# Patient Record
Sex: FEMALE | Race: WHITE | NOT HISPANIC OR LATINO | Employment: OTHER | URBAN - METROPOLITAN AREA
[De-identification: names, ages, dates, MRNs, and addresses within clinical notes are randomized per-mention and may not be internally consistent; named-entity substitution may affect disease eponyms.]

---

## 2017-06-12 ENCOUNTER — ALLSCRIPTS OFFICE VISIT (OUTPATIENT)
Dept: OTHER | Facility: OTHER | Age: 77
End: 2017-06-12

## 2017-08-21 ENCOUNTER — GENERIC CONVERSION - ENCOUNTER (OUTPATIENT)
Dept: OTHER | Facility: OTHER | Age: 77
End: 2017-08-21

## 2017-11-02 ENCOUNTER — GENERIC CONVERSION - ENCOUNTER (OUTPATIENT)
Dept: OTHER | Facility: OTHER | Age: 77
End: 2017-11-02

## 2017-11-28 ENCOUNTER — GENERIC CONVERSION - ENCOUNTER (OUTPATIENT)
Dept: OTHER | Facility: OTHER | Age: 77
End: 2017-11-28

## 2017-12-12 ENCOUNTER — ALLSCRIPTS OFFICE VISIT (OUTPATIENT)
Dept: OTHER | Facility: OTHER | Age: 77
End: 2017-12-12

## 2017-12-13 NOTE — PROGRESS NOTES
Assessment  Assessed    1  Anxiety (300 00) (F41 9)   2  Dyspnea on exertion (786 09) (R06 09)    Discussion/Summary  Cardiology Discussion Summary Free Text Note Form [facility]:   Patient overall doing well from a cardiovascular standpoint  Results of previous cardiovascular studies including stress test and echocardiogram from last year reviewed with the patient  Symptoms to watch out from cardiac standpoint which would indicate the need for further cardiac evaluation also discussed  Follow-up in 6 months or earlier as needed   follow-up with primary care physician  Chief Complaint  Chief Complaint Chronic Condition [facility]: Patient is here today for follow up of chronic conditions described in HPI  History of Present Illness  Cardiology HPI Free Text Note Form [facility]: Patient presents for follow-up visit  Patient denies any chest pain  No shortness of breath out of the ordinary  No history of leg edema orthopnea PND no history of presyncope syncope  No new complaints otherwise  Review of Systems  Cardiology Female ROS:    Cardiac: as noted in HPI  Skin: No complaints of nonhealing sores or skin rash  Genitourinary: No complaints of recurrent urinary tract infections, frequent urination at night, difficult urination, blood in urine, kidney stones, loss of bladder control, kidney problems, denies any birth control or hormone replacement, is not post menopausal, not currently pregnant  Psychological: anxiety  General: No complaints of trouble sleeping, lack of energy, fatigue, appetite changes, weight changes, fever, frequent infections, or night sweats  Respiratory: shortness of breath, but-- as noted in HPI  HEENT: No complaints of serious problems, hearing problems, nose problems, throat problems, or snoring  Gastrointestinal: No complaints of liver problems, nausea, vomiting, heartburn, constipation, bloody stools, diarrhea, problems swallowing, adbominal pain, or rectal bleeding  Hematologic: No complaints of bleeding disorders, anemia, blood clots, or excessive brusing  Neurological: No complaints of numbness, tingling, dizziness, weakness, seizures, headaches, syncope or fainting, AM fatigue, daytime sleepiness, no witnessed apnea episodes  Musculoskeletal: No complaints of arthritis, back pain, or painfull swelling  ROS Reviewed:   ROS reviewed  Active Problems  Problems    1  Anxiety (300 00) (F41 9)   2  Chest pain (786 50) (R07 9)   3  Dyspnea on exertion (786 09) (R06 09)   4  Encounter for counseling (V65 40) (Z71 9)   5  Encounter for routine gynecological examination (V72 31) (Z01 419)   6  Murmur (785 2) (R01 1)   7  Osteoporosis (733 00) (M81 0)   8  Vaginal Pap smear (V76 47) (Z12 72)   9  Visit for screening mammogram (V76 12) (Z12 31)    Past Medical History  Problems    1  History of  4 (V22 2) (Z33 1)   2  History of Hypothyroidism (244 9) (E03 9)   3  History of Kidney damage (866 00) (S37 009A)  Active Problems And Past Medical History Reviewed: The active problems and past medical history were reviewed and updated today  Surgical History  Problems    1  History of Appendectomy   2  History of Hemorrhoidectomy   3  History of Thyroid Surgery Substernal Thyroidectomy Partial   4  History of Total Abdominal Hysterectomy With Removal Of Both Ovaries   5  History of Tubal Ligation  Surgical History Reviewed: The surgical history was reviewed and updated today  Family History  Mother    1  No pertinent family history  Family History Reviewed: The family history was reviewed and updated today  Social History  Problems    · Denied: History of Alcohol use   · Denied: History of Home environment domestic violence   · Never a smoker  Social History Reviewed: The social history was reviewed and updated today  Current Meds   1  Caltrate Plus TABS; Therapy: (Recorded:2014) to Recorded   2   Levothyroxine Sodium 100 MCG Oral Tablet; Therapy: (Recorded:04Nov2014) to Recorded   3  Magnesium TABS; Therapy: (Recorded:04Nov2014) to Recorded   4  Nizatidine 150 MG Oral Capsule; Therapy: (Recorded:04Nov2014) to Recorded   5  Vitamin D3 CAPS; Therapy: (Recorded:04Nov2014) to Recorded   6  Xanax 0 5 MG Oral Tablet; Therapy: (Recorded:04Nov2014) to Recorded  Medication List Reviewed: The medication list was reviewed and updated today  Allergies  Medication    1  No Known Drug Allergies    Vitals  Vital Signs    Recorded: [id number] 10:03AM   Heart Rate 75   Systolic 359   Diastolic 82   Height 4 ft 11 in   Weight 142 lb    BMI Calculated 28 68   BSA Calculated 1 59   O2 Saturation 98       Physical Exam   Constitutional  General appearance: No acute distress, well appearing and well nourished  Eyes  Conjunctiva and Sclera examination: Conjunctiva pink, sclera anicteric  Ears, Nose, Mouth, and Throat - Oropharynx: Clear, nares are clear, mucous membranes are moist   Neck  Neck and thyroid: Normal, supple, trachea midline, no thyromegaly  Pulmonary  Respiratory effort: No increased work of breathing or signs of respiratory distress  Auscultation of lungs: Clear to auscultation, no rales, no rhonchi, no wheezing, good air movement  Cardiovascular  Auscultation of heart: Normal rate and rhythm, normal S1 and S2, no murmurs  Carotid pulses: Normal, 2+ bilaterally  Peripheral vascular exam: Normal pulses throughout, no tenderness, erythema or swelling  Pedal pulses: Normal, 2+ bilaterally  Examination of extremities for edema and/or varicosities: Normal    Abdomen  Abdomen: Non-tender and no distention  Liver and spleen: No hepatomegaly or splenomegaly  Musculoskeletal Gait and station: Normal gait  -- Digits and nails: Normal without clubbing or cyanosis  -- Inspection/palpation of joints, bones, and muscles: Normal, ROM normal    Skin - Skin and subcutaneous tissue: Normal without rashes or lesions   Skin is warm and well perfused, normal turgor  Neurologic - Cranial nerves: II - XII intact  -- Speech: Normal    Psychiatric - Orientation to person, place, and time: Normal -- Mood and affect: Normal       Future Appointments    Date/Time Provider Specialty Site   01/23/2018 09:00 AM NORA Fisher   [facility]       Signatures   Electronically signed by : NORA Tong ; Dec 12 2017 10:02PM EST                       (Author)

## 2018-01-12 VITALS — WEIGHT: 143.13 LBS | DIASTOLIC BLOOD PRESSURE: 58 MMHG | HEART RATE: 72 BPM | SYSTOLIC BLOOD PRESSURE: 130 MMHG

## 2018-01-18 NOTE — RESULT NOTES
Verified Results  ECHO COMPLETE WITH CONTRAST IF INDICATED, TTE / TRANSTHORACIC [id number] 10:43AM Carole Killian Order Number: [id number]     Test Name Result Flag Reference   ECHO COMPLETE WITH CONTRAST IF INDICATED (Report)     [facility], [address] [place] [address]   [phone]     Transthoracic Echocardiogram   2D, M-mode, and Color Doppler     Study date: 01-Mar-2016     Patient: Leslie Drummond   MR number: [id number]   Account number: [de-identified]   : 1940   Age: 67 years   Gender: Female   Status: Outpatient   Location: [facility]   Height: 59 in   Weight: 146 lb   BP: 120/ 68 mmHg     Indications: Dyspnea  Diagnoses: R06 09 - Other forms of dyspnea     Sonographer: Parish RCS   Referring Physician: Arlet Mclain MD   Group: [facility]   Interpreting Physician: Arlet Mclain MD     SUMMARY     LEFT VENTRICLE:   Ejection fraction was estimated to be 60 %  There were no regional wall motion abnormalities  There was an increased relative contribution of atrial contraction to   ventricular filling  MITRAL VALVE:   There was trace regurgitation  TRICUSPID VALVE:   There was trace regurgitation  PROCEDURE: The study was performed in the [address] [place] [address]  This   was a routine study  The transthoracic approach was used  The study included   complete 2D imaging, M-mode, and color Doppler  The heart rate was 70 bpm, at   the start of the study  Images were obtained from the parasternal, apical,   subcostal, and suprasternal notch acoustic windows  Image quality was adequate  LEFT VENTRICLE: Size was normal  Ejection fraction was estimated to be 60 %  There were no regional wall motion abnormalities  DOPPLER: There was an   increased relative contribution of atrial contraction to ventricular filling       RIGHT VENTRICLE: The size was normal  Systolic function was normal  Wall   thickness was normal      LEFT ATRIUM: Size was normal      RIGHT ATRIUM: Size was normal      MITRAL VALVE: Valve structure was normal  There was normal leaflet separation  DOPPLER: The transmitral velocity was within the normal range  There was no   evidence for stenosis  There was trace regurgitation  AORTIC VALVE: The valve was trileaflet  Leaflets exhibited mildly increased   thickness and normal cuspal separation  The valve was not well visualized  DOPPLER: Transaortic velocity was within the normal range  There was no   evidence for stenosis  There was no regurgitation  TRICUSPID VALVE: The valve structure was normal  There was normal leaflet   separation  DOPPLER: The transtricuspid velocity was within the normal range  There was no evidence for stenosis  There was trace regurgitation  PULMONIC VALVE: Leaflets exhibited normal thickness, no calcification, and   normal cuspal separation  DOPPLER: The transpulmonic velocity was within the   normal range  There was no regurgitation  PERICARDIUM: There was no pericardial effusion  The pericardium was normal in   appearance  AORTA: The root exhibited normal size  SYSTEM MEASUREMENT TABLES     Apical four chamber   4 chamber Left Atrium Volume Index; Planimetry; End Systole; Apical four   chamber;: 8 82 cm2 Left Ventricular Diastolic Area; Method of Disks, Single   Plane; End Diastole; Apical four chamber;: 22 86 cm2 Left Ventricular Ejection   Fraction; Method of Disks, Single Plane; Apical four chamber;: 71 7 % Left   Ventricular systolic Area; Method of Disks, Single Plane; End Systole; Apical   four chamber;: 11 cm2 Right Atrium Systolic Area; Planimetry; End Systole; Apical four chamber;: 9 16 cm2 Right Ventricular Internal Diastolic Dimension; End Diastole; Apical four chamber;: 23 9 mm   TAPSE: 18 8 mm     Unspecified Scan Mode   Aortic Root Diameter;  End Systole;: 28 7 mm   Gradient Pressure, Peak; Simplified Bernoulli; Antegrade Flow; Systole;: 7 7   mm[Hg] Gradient pressure, average; Simplified Bernoulli; Antegrade Flow;   Systole;: 4 mm[Hg]   Left atrial diameter; End Diastole;: 31 8 mm   Cardiac Output; Teichholz; Systole;: 2 95 L/min   Heart rate; Teichholz;: 67 {H  B }/min   Interventricular Septum Diastolic Thickness; Teichholz; End Diastole;: 9 7 mm   Left Ventricle Internal End Diastolic Dimension; Teichholz;: 35 7 mm   Left Ventricle Internal Systolic Dimension; Teichholz; End Systole;: 19 8 mm   Left Ventricle Mass; Mass AVCube with Teichholz; End Diastole;: 92 g   Left Ventricle Posterior Wall Diastolic Thickness; Teichholz; End Diastole;:   8 4 mm   Left Ventricular Ejection Fraction; Teichholz;: 76 7 %   Left Ventricular End Diastolic Volume; Teichholz;: 53 3 ml   Left Ventricular End Systolic Volume; Teichholz;: 12 4 ml   Left Ventricular Fractional Shortening;: 44 5 %   Stroke volume;  Teichholz; Systole;: 40 9 ml   Mitral Valve Area; Area by Pressure Half-Time; Systole;: 3 93 cm2   Mitral Valve E to A Ratio; Systole;: 0 95   Pressure half time; Diastole;: 0 06 s   Maximum Tricuspid valve regurgitation pressure gradient; Regurgitant Flow;   Systole;: 17 2 mm[Hg]     [facility] Accredited [facility]ch[facility] Laboratory     Prepared and electronically signed by     Raghu Montes MD   Signed 02-Mar-2016 16:11:01

## 2018-01-22 VITALS
SYSTOLIC BLOOD PRESSURE: 124 MMHG | DIASTOLIC BLOOD PRESSURE: 68 MMHG | HEIGHT: 59 IN | BODY MASS INDEX: 28.83 KG/M2 | WEIGHT: 143 LBS

## 2018-01-23 ENCOUNTER — GENERIC CONVERSION - ENCOUNTER (OUTPATIENT)
Dept: OTHER | Facility: OTHER | Age: 78
End: 2018-01-23

## 2018-01-23 ENCOUNTER — ALLSCRIPTS OFFICE VISIT (OUTPATIENT)
Dept: OTHER | Facility: OTHER | Age: 78
End: 2018-01-23

## 2018-01-23 ENCOUNTER — LAB REQUISITION (OUTPATIENT)
Dept: LAB | Facility: HOSPITAL | Age: 78
End: 2018-01-23
Payer: MEDICARE

## 2018-01-23 VITALS
OXYGEN SATURATION: 98 % | BODY MASS INDEX: 28.63 KG/M2 | HEART RATE: 75 BPM | HEIGHT: 59 IN | WEIGHT: 142 LBS | DIASTOLIC BLOOD PRESSURE: 82 MMHG | SYSTOLIC BLOOD PRESSURE: 140 MMHG

## 2018-01-23 DIAGNOSIS — D22.9 MELANOCYTIC NEVUS: ICD-10-CM

## 2018-01-23 PROCEDURE — 88305 TISSUE EXAM BY PATHOLOGIST: CPT | Performed by: DERMATOLOGY

## 2018-01-24 NOTE — PROGRESS NOTES
Chief Complaint  CC: New patient full body evaluation of moles and lesions  Primarily concerned with wart on mid-section of abdomen  History of Present Illness  67-year-old female presents for overall skin check concerned regarding a couple lesions on both the right abdomen and left back that gets irritated by her panty line and painful at times  Patient also concerned regarding a rash that developed when she had her cataract surgery in both her armpits  The pigmentation has not completely resolved discoloration still has remained patient reports that the on the medication that she received at that time was fentanyl and Versed  Nothing else of concern noted      Assessment  Assessed    1  Multiple benign nevi without atypia (216 9) (D22 9)   2  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)   3  Screening for skin condition (V82 0) (Z13 89)    Active Problems  Problems    1  Anxiety (300 00) (F41 9)   2  Chest pain (786 50) (R07 9)   3  Dyspnea on exertion (786 09) (R06 09)   4  Encounter for counseling (V65 40) (Z71 9)   5  Encounter for routine gynecological examination (V72 31) (Z01 419)   6  Murmur (785 2) (R01 1)   7  Osteoporosis (733 00) (M81 0)   8  Vaginal Pap smear (V76 47) (Z12 72)   9  Visit for screening mammogram (V76 12) (Z12 31)    Past Medical History  Problems    1  History of  4 (V22 2) (Z33 1)   2  History of Hypothyroidism (244 9) (E03 9)   3  History of Kidney damage (866 00) (S37 009A)    The past medical history was reviewed  Surgical History  Problems    1  History of Appendectomy   2  History of Hemorrhoidectomy   3  History of Thyroid Surgery Substernal Thyroidectomy Partial   4  History of Total Abdominal Hysterectomy With Removal Of Both Ovaries   5  History of Tubal Ligation    Surgical History reviewed      Family History  Mother    1   No pertinent family history  Family History Reviewed- Derm:   Family History was reviewed      Social History  Problems    · Denied: History of Alcohol use   · Denied: History of Home environment domestic violence   · Never a smoker  The social history was reviewed      Current Meds   1  Caltrate Plus TABS; Therapy: (Recorded:04Nov2014) to Recorded   2  Levothyroxine Sodium 100 MCG Oral Tablet; Therapy: (Recorded:04Nov2014) to Recorded   3  Magnesium TABS; Therapy: (Recorded:04Nov2014) to Recorded   4  Nizatidine 150 MG Oral Capsule; Therapy: (Recorded:04Nov2014) to Recorded   5  Vitamin D3 CAPS; Therapy: (Recorded:04Nov2014) to Recorded   6  Xanax 0 5 MG Oral Tablet; Therapy: (Recorded:04Nov2014) to Recorded    Review of Systems    Constitutional: Denies constitutional symptoms  Eyes: Denies eye symptoms  ENT:  denies ear symptoms, nasal symptoms, mouth or throat symptoms  Cardiovascular: Denies cardiovascular symptoms  Respiratory: Denies respiratory symptoms  Gastrointestinal: Denies gastrointestinal symptoms  Musculoskeletal: Denies musculoskeletal symptoms  Integumentary: Denies symptoms other than stated above  Neurological: Denies neurologic symptoms  Psychiatric: Denies psychiatric symptoms  Endocrine: Denies endocrine symptoms  Hematologic/Lymphatic: Denies hematologic symptoms  Allergies  Medication    1  No Known Drug Allergies    Vitals  Vital Signs    Recorded: [id number] 43:02DL   Systolic 879   Diastolic 74   Weight 561 lb 2 0 oz   BMI Calculated 28 91   BSA Calculated 1 6     Physical Exam    Constitutional   General appearance: Appears healthy and well developed  Lymphatic   No visible disturbance  Musculoskeletal   Digits and nails: No clubbing, cyanosis or edema  Cutaneous and nail exam normal     Skin   Scalp skin texture and hair distribution: Normal skin texture on scalp, normal hair distribution  Head: Normal turgor, no rashes, no lesions  Neck: Normal turgor, no rashes, no lesions  Chest: Normal turgor, no rashes, no lesions      Abdomen: Abnormal     Back: Abnormal     Right upper extremity: Abnormal     Left upper extremity: Abnormal     Right lower extremity: Normal turgor, no rashes, no lesions  Left lower extremity: Normal turgor, no rashes, no lesions  Neuro/Psych   Alert and oriented x 3  Displays comfort and cooperation during encounterl  Affect is normal     Finding Hyperpigmented patches noted in both axilla dome-shaped fleshy papules noted on the right abdomen and left back both measure about 4 mm in size nothing markedly atypical nothing else of concern noted on complete exam       Procedure    Procedure: excision of lesion  Indications for the procedure include Irritated nevi  Risks, benefits, alternatives, infection risk, bleeding risk, risk of allergic reaction and risk of scarring were discussed with the patient   verbal consent was obtained prior to the procedure  Procedure Note: The lesion was located on the Left back and right abdomen  The patient was prepped and draped in sterile fashion using Betadine  Anesthesia: 1 ml of lidocaine 1% without epinephrine  Shave excision x2  The hemostasis of the wound was achieved with Aluminum chloride  Dressing: The wound was cleaned and petroleum jelly was applied, a sterile dressing was placed and a sterile compression dressing was placed  Specimen: the excised lesion was place in buffered formalin and sent for pathology  Post-Procedure:   Patient Status: the patient tolerated the procedure well  Complications: there were no complications  Plan  Multiple benign nevi without atypia    · Wound care as instructed ; Status:Complete;   Done: [id number]    Discussion/Summary    Assessment #1: Nevi  Care Plan:   Reviewed the concept of ABCDE and ugly duckling lesions both noted on the abdomen and back are normal appearing but removed because of pain and discomfort  Assessment #2: Fixed drug reaction     Care Plan:   Discussed the concept of this process most likely candidate would be Versed but difficult to assess  Assessment #3: Screening for dermatologic disorders  Care Plan:   Nothing else of concern noted on complete exam follow-up as needed        Signatures   Electronically signed by : NORA Isaacs ; Jan 23 2018  9:53AM EST                       (Author)

## 2018-06-29 RX ORDER — ALPRAZOLAM 0.5 MG/1
TABLET ORAL
COMMUNITY
End: 2020-05-13 | Stop reason: SDUPTHER

## 2018-06-29 RX ORDER — CAL/D3/MAG11/ZINC/COP/MANG/BOR 600 MG-800
TABLET ORAL DAILY
COMMUNITY

## 2018-06-29 RX ORDER — BIOTIN 1 MG
TABLET ORAL DAILY
COMMUNITY

## 2018-06-29 RX ORDER — NIZATIDINE 150 MG/1
150 CAPSULE ORAL 2 TIMES DAILY
Refills: 5 | COMMUNITY
Start: 2018-05-12 | End: 2020-05-08

## 2018-06-29 RX ORDER — LEVOTHYROXINE SODIUM 0.1 MG/1
TABLET ORAL DAILY
COMMUNITY
End: 2020-07-27

## 2018-08-09 ENCOUNTER — OFFICE VISIT (OUTPATIENT)
Dept: CARDIOLOGY CLINIC | Facility: CLINIC | Age: 78
End: 2018-08-09
Payer: MEDICARE

## 2018-08-09 VITALS
OXYGEN SATURATION: 98 % | HEIGHT: 59 IN | HEART RATE: 78 BPM | WEIGHT: 139.8 LBS | SYSTOLIC BLOOD PRESSURE: 128 MMHG | BODY MASS INDEX: 28.18 KG/M2 | DIASTOLIC BLOOD PRESSURE: 52 MMHG

## 2018-08-09 DIAGNOSIS — F41.9 ANXIETY: Primary | ICD-10-CM

## 2018-08-09 DIAGNOSIS — R06.00 DYSPNEA ON EFFORT: ICD-10-CM

## 2018-08-09 PROBLEM — R06.09 DYSPNEA ON EFFORT: Status: ACTIVE | Noted: 2018-08-09

## 2018-08-09 PROCEDURE — 99213 OFFICE O/P EST LOW 20 MIN: CPT | Performed by: INTERNAL MEDICINE

## 2018-08-09 NOTE — PROGRESS NOTES
[facility] [place]  [address]  [address] [place]  Cardiology Follow Up    Leland Brown  1940  [id number]      1  Anxiety     2  Dyspnea on effort         Chief Complaint   Patient presents with    Follow-up    Shortness of Breath       Interval History:  Patient presents for follow-up visit  Patient does have some shortness of breath with his more than usual   No history of leg edema orthopnea PN D  No recent cardiac workup  Last cardiac workup was 2 years ago or more  Patient Active Problem List   Diagnosis    Anxiety    Dyspnea on effort     Past Medical History:   Diagnosis Date    Anxiety     Disease of thyroid gland     Heart murmur      Social History     Social History    Marital status: /Civil Union     Spouse name: N/A    Number of children: N/A    Years of education: N/A     Occupational History    Not on file       Social History Main Topics    Smoking status: Former Smoker    Smokeless tobacco: Never Used    Alcohol use No      Comment: rarely    Drug use: No    Sexual activity: Not on file     Other Topics Concern    Not on file     Social History Narrative    No narrative on file      Family History   Problem Relation Age of Onset    Hypertension Father     Asthma Son     Arrhythmia Son      Past Surgical History:   Procedure Laterality Date    APPENDECTOMY      HEMORRHOID SURGERY      THYROID SURGERY      TOTAL ABDOMINAL HYSTERECTOMY      TUBAL LIGATION         Current Outpatient Prescriptions:     ALPRAZolam (XANAX) 0 5 mg tablet, Take by mouth, Disp: , Rfl:     Calcium Carbonate-Vit D-Min (CALTRATE 600+D PLUS MINERALS) 600-800 MG-UNIT TABS, Take by mouth, Disp: , Rfl:     Cholecalciferol (VITAMIN D3) 1000 units CAPS, Take by mouth, Disp: , Rfl:     levothyroxine 100 mcg tablet, Take by mouth, Disp: , Rfl:     MAGNESIUM PO, Take by mouth, Disp: , Rfl:     nizatidine (AXID) 150 MG capsule, 150 mg 2 (two) times a day, Disp: , Rfl: 5  No Known Allergies    Labs:  No visits with results within 2 Month(s) from this visit  Latest known visit with results is:   Lab Requisition on 01/23/2018   Component Date Value    Case Report 01/23/2018                      Value:Surgical Pathology Report                         Case: [id number]                                   Authorizing Provider:  Anival Wesley MD          Collected:           01/23/2018 1023              Pathologist:           Neha Danielle MD           Received:            01/24/2018 1206              Specimen:    Skin, Other, Left back and right abdomen                                                   Final Diagnosis 01/23/2018                      Value: This result contains rich text formatting which cannot be displayed here   Additional Information 01/23/2018                      Value: This result contains rich text formatting which cannot be displayed here  Malu Frye Gross Description 01/23/2018                      Value: This result contains rich text formatting which cannot be displayed here   Clinical Information 01/23/2018                      Value:TW Order Number: [id number]  Irritated nevi     Imaging: No results found      Review of Systems:  Review of Systems   REVIEW OF SYSTEMS:  Constitutional:  Denies fever or chills   Eyes:  Denies change in visual acuity   HENT:  Denies nasal congestion or sore throat   Respiratory:   shortness of breath   Cardiovascular:  Denies chest pain or edema   GI:  Denies abdominal pain, nausea, vomiting, bloody stools or diarrhea   :  Denies dysuria, frequency, difficulty in micturition and nocturia  Musculoskeletal:  Denies back pain or joint pain   Neurologic:  Denies headache, focal weakness or sensory changes   Endocrine:  Denies polyuria or polydipsia   Lymphatic:  Denies swollen glands   Psychiatric:  Denies depression or anxiety     Physical Exam:    /52 (BP Location: Left arm, Patient Position: Sitting, Cuff Size: Adult)   Pulse 78   Ht 4' 11" (1 499 m)   Wt 63 4 kg (139 lb 12 8 oz)   SpO2 98%   BMI 28 24 kg/m²     Physical Exam   PHYSICAL EXAM:  General:  Patient is not in acute distress   Head: Normocephalic, Atraumatic  HEENT:  Both pupils normal-size atraumatic, normocephalic, nonicteric  Neck:  JVP not raised  Trachea central  No carotid bruit  Respiratory:  normal breath sounds no crackles  no rhonchi  Cardiovascular:  Regular rate and rhythm no S3 no murmurs  GI:  Abdomen soft nontender  No organomegaly  Lymphatic:  No cervical or inguinal lymphadenopathy  Neurologic:  Patient is awake alert, oriented   Grossly nonfocal    Discussion/Summary:  Patient has symptoms of shortness of breath with exertion which is more than usual   Patient will be scheduled for an echocardiogram to evaluate ejection fraction valves and look for evidence of pulmonary hypertension  Patient will also be scheduled for pharmacological nuclear stress test to assess for ischemia  Patient is unable to exercise on the treadmill because of significant shortness of breath with minimal exertion  Symptoms water from cardiac standpoint which would indicate the need for further cardiac evaluation including cardiac catheterization discussed  Follow-up in 6 months  Follow-up with primary care physician  Patient is agreeable with the plan of care

## 2018-09-04 ENCOUNTER — TRANSCRIBE ORDERS (OUTPATIENT)
Dept: MRI IMAGING | Facility: CLINIC | Age: 78
End: 2018-09-04

## 2018-09-05 ENCOUNTER — TELEPHONE (OUTPATIENT)
Dept: CARDIOLOGY CLINIC | Facility: CLINIC | Age: 78
End: 2018-09-05

## 2018-09-05 NOTE — TELEPHONE ENCOUNTER
[facility] FROM [facility] CALLED AND SAID THEY NEED A NEW DX CODE FOR THE NUCLEAR STRESS TEST THAT PT IS SCHED FOR TOMORROW   Edgar Cohen

## 2018-09-06 ENCOUNTER — HOSPITAL ENCOUNTER (OUTPATIENT)
Dept: NON INVASIVE DIAGNOSTICS | Facility: CLINIC | Age: 78
Discharge: HOME | End: 2018-09-06
Payer: MEDICARE

## 2018-09-06 DIAGNOSIS — R06.00 DYSPNEA ON EFFORT: ICD-10-CM

## 2018-09-06 DIAGNOSIS — R06.02 SOB (SHORTNESS OF BREATH): ICD-10-CM

## 2018-09-06 PROCEDURE — A9502 TC99M TETROFOSMIN: HCPCS

## 2018-09-06 PROCEDURE — C8929 TTE W OR WO FOL WCON,DOPPLER: HCPCS

## 2018-09-06 PROCEDURE — 93306 TTE W/DOPPLER COMPLETE: CPT | Performed by: INTERNAL MEDICINE

## 2018-09-06 PROCEDURE — 93016 CV STRESS TEST SUPVJ ONLY: CPT | Performed by: INTERNAL MEDICINE

## 2018-09-06 PROCEDURE — 93018 CV STRESS TEST I&R ONLY: CPT | Performed by: INTERNAL MEDICINE

## 2018-09-06 PROCEDURE — 78452 HT MUSCLE IMAGE SPECT MULT: CPT

## 2018-09-06 PROCEDURE — 78452 HT MUSCLE IMAGE SPECT MULT: CPT | Performed by: INTERNAL MEDICINE

## 2018-09-06 PROCEDURE — 93017 CV STRESS TEST TRACING ONLY: CPT

## 2018-09-06 RX ADMIN — PERFLUTREN 0.4 ML/MIN: 6.52 INJECTION, SUSPENSION INTRAVENOUS at 12:00

## 2018-09-06 RX ADMIN — REGADENOSON 0.4 MG: 0.08 INJECTION, SOLUTION INTRAVENOUS at 13:42

## 2018-09-06 NOTE — TELEPHONE ENCOUNTER
Jorge Luis Fairly called again and stated that per the "ABN trigger" for medicare it states this item or service is not covered for your condition  She would like to know if a new order with dx code can be sent

## 2018-09-07 ENCOUNTER — TELEPHONE (OUTPATIENT)
Dept: CARDIOLOGY CLINIC | Facility: CLINIC | Age: 78
End: 2018-09-07

## 2018-09-07 LAB
ARRHY DURING EX: NORMAL
CHEST PAIN STATEMENT: NORMAL
MAX DIASTOLIC BP: 60 MMHG
MAX HEART RATE: 116 BPM
MAX PREDICTED HEART RATE: 142 BPM
MAX. SYSTOLIC BP: 146 MMHG
PROTOCOL NAME: NORMAL
REASON FOR TERMINATION: NORMAL
TARGET HR FORMULA: NORMAL
TEST INDICATION: NORMAL
TIME IN EXERCISE PHASE: NORMAL

## 2018-09-07 NOTE — TELEPHONE ENCOUNTER
EARLE CALLED & SAID THAT PT ALREADY HAD THE TEST DONE & NEEDS TO KNOW IF A NEW ORDER NEEDS TO BE WRITTEN & W/ WHAT DX

## 2018-09-10 NOTE — TELEPHONE ENCOUNTER
Pt did not want to go to [facility], she is coming in on Monday 9/17 in [place], there was a cancellation

## 2018-09-17 ENCOUNTER — OFFICE VISIT (OUTPATIENT)
Dept: CARDIOLOGY CLINIC | Facility: CLINIC | Age: 78
End: 2018-09-17
Payer: MEDICARE

## 2018-09-17 VITALS
HEART RATE: 78 BPM | HEIGHT: 59 IN | SYSTOLIC BLOOD PRESSURE: 124 MMHG | DIASTOLIC BLOOD PRESSURE: 72 MMHG | WEIGHT: 139.8 LBS | BODY MASS INDEX: 28.18 KG/M2 | OXYGEN SATURATION: 99 %

## 2018-09-17 DIAGNOSIS — F41.9 ANXIETY: Primary | ICD-10-CM

## 2018-09-17 DIAGNOSIS — R06.00 DYSPNEA ON EFFORT: ICD-10-CM

## 2018-09-17 DIAGNOSIS — R94.39 ABNORMAL STRESS TEST: ICD-10-CM

## 2018-09-17 PROCEDURE — 99213 OFFICE O/P EST LOW 20 MIN: CPT | Performed by: INTERNAL MEDICINE

## 2018-09-17 NOTE — PROGRESS NOTES
[facility] [place]  [address] [place] [address] [address] [place]  Cardiology Follow Up    Raymon Baker  1940  [id number]      1  Anxiety     2  Dyspnea on effort     3  Abnormal stress test         Chief Complaint   Patient presents with    Follow-up    Results       Interval History:  Patient presents for follow-up visit  Patient denies any history of chest pain or shortness of breath presently  Patient did have a pharmacological nuclear stress test recently  Nuclear images were inconclusive  There was an anterior defect with some partial reversibility which could be rate related to breast attenuation versus ischemia  Patient did have an anterior defect suggestive of breast tissue attenuation about 2 years ago  Patient has no new complaints  Patient Active Problem List   Diagnosis    Anxiety    Dyspnea on effort    Abnormal stress test     Past Medical History:   Diagnosis Date    Anxiety     Disease of thyroid gland     Heart murmur      Social History     Social History    Marital status: /Civil Union     Spouse name: N/A    Number of children: N/A    Years of education: N/A     Occupational History    Not on file       Social History Main Topics    Smoking status: Former Smoker    Smokeless tobacco: Never Used    Alcohol use No      Comment: rarely    Drug use: No    Sexual activity: Not on file     Other Topics Concern    Not on file     Social History Narrative    No narrative on file      Family History   Problem Relation Age of Onset    Hypertension Father     Asthma Son     Arrhythmia Son      Past Surgical History:   Procedure Laterality Date    APPENDECTOMY      HEMORRHOID SURGERY      THYROID SURGERY      TOTAL ABDOMINAL HYSTERECTOMY      TUBAL LIGATION         Current Outpatient Prescriptions:     ALPRAZolam (XANAX) 0 5 mg tablet, Take by mouth, Disp: , Rfl:     Calcium Carbonate-Vit D-Min (CALTRATE 600+D PLUS MINERALS) 600-800 MG-UNIT TABS, Take by mouth daily  , Disp: , Rfl:     Cholecalciferol (VITAMIN D3) 1000 units CAPS, Take by mouth daily  , Disp: , Rfl:     levothyroxine 100 mcg tablet, Take by mouth daily  , Disp: , Rfl:     MAGNESIUM PO, Take by mouth daily  , Disp: , Rfl:     nizatidine (AXID) 150 MG capsule, 150 mg 2 (two) times a day, Disp: , Rfl: 5  No Known Allergies    Labs:  Hospital Outpatient Visit on 09/06/2018   Component Date Value    Protocol Name 09/06/2018 LEXISCAN-SIT     Time In Exercise Phase 09/06/2018 00:03:00     MAX  SYSTOLIC BP 72/55/6823 637     Max Diastolic Bp 81/70/0552 60     Max Heart Rate 09/06/2018 116     Max Predicted Heart Rate 09/06/2018 142     Reason for Termination 09/06/2018 Protocol Complete     Test Indication 09/06/2018 DYSPNEA, ROBERTSON     Target Hr Formular 09/06/2018 (220 - Age)*85%     Arrhy During Ex 09/06/2018 none     Chest Pain Statement 09/06/2018 none      Imaging: No results found  Review of Systems:  Review of Systems   REVIEW OF SYSTEMS:  Constitutional:  Denies fever or chills   Eyes:  Denies change in visual acuity   HENT:  Denies nasal congestion or sore throat   Respiratory:  Denies cough or shortness of breath   Cardiovascular:  Denies chest pain or edema   GI:  Denies abdominal pain, nausea, vomiting, bloody stools or diarrhea   :  Denies dysuria, frequency, difficulty in micturition and nocturia  Musculoskeletal:  Denies back pain or joint pain   Neurologic:  Denies headache, focal weakness or sensory changes   Endocrine:  Denies polyuria or polydipsia   Lymphatic:  Denies swollen glands   Psychiatric:  Denies depression or anxiety     Physical Exam:    /72   Pulse 78   Ht 4' 11" (1 499 m)   Wt 63 4 kg (139 lb 12 8 oz)   SpO2 99%   BMI 28 24 kg/m²     Physical Exam  PHYSICAL EXAM:  General:  Patient is not in acute distress   Head: Normocephalic, Atraumatic    HEENT:  Both pupils normal-size atraumatic, normocephalic, nonicteric  Neck:  JVP not raised  Trachea central  No carotid bruit  Respiratory:  normal breath sounds no crackles  no rhonchi  Cardiovascular:  Regular rate and rhythm no S3 no murmurs  GI:  Abdomen soft nontender  No organomegaly  Lymphatic:  No cervical or inguinal lymphadenopathy  Neurologic:  Patient is awake alert, oriented   Grossly nonfocal      Discussion/Summary:  Patient with no symptoms presently referable to cardiovascular system with the inconclusive a nuclear stress test   Patient had a anti a defect with partial reversibility which could be related to breast tissue attenuation versus ischemia  Patient has no symptoms presently  Further workup from cardiac standpoint which would indicate the need for further cardiac evaluation including cardiac catheterization discussed with the patient  She is agreeable with the plan  Follow-up in 6 months  Follow-up with primary care physician

## 2018-11-01 DIAGNOSIS — Z12.31 ENCOUNTER FOR SCREENING MAMMOGRAM FOR MALIGNANT NEOPLASM OF BREAST: ICD-10-CM

## 2018-11-14 ENCOUNTER — OFFICE VISIT (OUTPATIENT)
Dept: CARDIOLOGY CLINIC | Facility: CLINIC | Age: 78
End: 2018-11-14
Payer: MEDICARE

## 2018-11-14 ENCOUNTER — TELEPHONE (OUTPATIENT)
Dept: CARDIOLOGY CLINIC | Facility: CLINIC | Age: 78
End: 2018-11-14

## 2018-11-14 ENCOUNTER — APPOINTMENT (OUTPATIENT)
Dept: LAB | Facility: CLINIC | Age: 78
End: 2018-11-14
Payer: MEDICARE

## 2018-11-14 VITALS
HEIGHT: 59 IN | SYSTOLIC BLOOD PRESSURE: 136 MMHG | BODY MASS INDEX: 28.63 KG/M2 | HEART RATE: 83 BPM | RESPIRATION RATE: 18 BRPM | OXYGEN SATURATION: 99 % | WEIGHT: 142 LBS | DIASTOLIC BLOOD PRESSURE: 68 MMHG

## 2018-11-14 DIAGNOSIS — R94.39 ABNORMAL STRESS TEST: ICD-10-CM

## 2018-11-14 DIAGNOSIS — F41.9 ANXIETY: Primary | ICD-10-CM

## 2018-11-14 DIAGNOSIS — R06.00 DYSPNEA ON EFFORT: ICD-10-CM

## 2018-11-14 LAB
ANION GAP SERPL CALCULATED.3IONS-SCNC: 4 MMOL/L (ref 4–13)
BASOPHILS # BLD AUTO: 0.08 THOUSANDS/ΜL (ref 0–0.1)
BASOPHILS NFR BLD AUTO: 2 % (ref 0–1)
BUN SERPL-MCNC: 19 MG/DL (ref 5–25)
CALCIUM SERPL-MCNC: 9.2 MG/DL (ref 8.3–10.1)
CHLORIDE SERPL-SCNC: 105 MMOL/L (ref 100–108)
CO2 SERPL-SCNC: 28 MMOL/L (ref 21–32)
CREAT SERPL-MCNC: 0.96 MG/DL (ref 0.6–1.3)
EOSINOPHIL # BLD AUTO: 0.18 THOUSAND/ΜL (ref 0–0.61)
EOSINOPHIL NFR BLD AUTO: 3 % (ref 0–6)
ERYTHROCYTE [DISTWIDTH] IN BLOOD BY AUTOMATED COUNT: 12.6 % (ref 11.6–15.1)
GFR SERPL CREATININE-BSD FRML MDRD: 56 ML/MIN/1.73SQ M
GLUCOSE SERPL-MCNC: 74 MG/DL (ref 65–140)
HCT VFR BLD AUTO: 42.5 % (ref 34.8–46.1)
HGB BLD-MCNC: 13.2 G/DL (ref 11.5–15.4)
IMM GRANULOCYTES # BLD AUTO: 0.01 THOUSAND/UL (ref 0–0.2)
IMM GRANULOCYTES NFR BLD AUTO: 0 % (ref 0–2)
INR PPP: 1 (ref 0.86–1.17)
LYMPHOCYTES # BLD AUTO: 2.06 THOUSANDS/ΜL (ref 0.6–4.47)
LYMPHOCYTES NFR BLD AUTO: 38 % (ref 14–44)
MCH RBC QN AUTO: 30 PG (ref 26.8–34.3)
MCHC RBC AUTO-ENTMCNC: 31.1 G/DL (ref 31.4–37.4)
MCV RBC AUTO: 97 FL (ref 82–98)
MONOCYTES # BLD AUTO: 0.58 THOUSAND/ΜL (ref 0.17–1.22)
MONOCYTES NFR BLD AUTO: 11 % (ref 4–12)
NEUTROPHILS # BLD AUTO: 2.56 THOUSANDS/ΜL (ref 1.85–7.62)
NEUTS SEG NFR BLD AUTO: 46 % (ref 43–75)
NRBC BLD AUTO-RTO: 0 /100 WBCS
PLATELET # BLD AUTO: 348 THOUSANDS/UL (ref 149–390)
PMV BLD AUTO: 9.6 FL (ref 8.9–12.7)
POTASSIUM SERPL-SCNC: 3.9 MMOL/L (ref 3.5–5.3)
PROTHROMBIN TIME: 13.3 SECONDS (ref 11.8–14.2)
RBC # BLD AUTO: 4.4 MILLION/UL (ref 3.81–5.12)
SODIUM SERPL-SCNC: 137 MMOL/L (ref 136–145)
WBC # BLD AUTO: 5.47 THOUSAND/UL (ref 4.31–10.16)

## 2018-11-14 PROCEDURE — 85025 COMPLETE CBC W/AUTO DIFF WBC: CPT | Performed by: INTERNAL MEDICINE

## 2018-11-14 PROCEDURE — 80048 BASIC METABOLIC PNL TOTAL CA: CPT | Performed by: INTERNAL MEDICINE

## 2018-11-14 PROCEDURE — 85610 PROTHROMBIN TIME: CPT | Performed by: INTERNAL MEDICINE

## 2018-11-14 PROCEDURE — 36415 COLL VENOUS BLD VENIPUNCTURE: CPT | Performed by: INTERNAL MEDICINE

## 2018-11-14 PROCEDURE — 99214 OFFICE O/P EST MOD 30 MIN: CPT | Performed by: INTERNAL MEDICINE

## 2018-11-14 RX ORDER — ASPIRIN 81 MG/1
81 TABLET ORAL DAILY
Qty: 30 TABLET | Refills: 0
Start: 2018-11-14

## 2018-11-14 NOTE — PROGRESS NOTES
[facility]  [address]  [address] [place]  Cardiology Follow Up    David Marroquin  1940  [id number]      1  Anxiety     2  Dyspnea on effort     3  Abnormal stress test         Chief Complaint   Patient presents with    Follow-up     Pt states to discuss cath       Interval History:  Patient presents for follow-up visit  Patient has been having symptoms of shortness of breath and she is not sure  Patient did have a pharmacological nuclear stress test a few months ago which showed some EKG changes on the Lexiscan stress test   Patient also had an anterior defect  No definite determination could be made as to whether this is a breast tissue attenuation versus ischemia  Patient states that her symptoms of shortness of breath a new presently  She states that she has been compliant with her diet as well as medications  Patient Active Problem List   Diagnosis    Anxiety    Dyspnea on effort    Abnormal stress test     Past Medical History:   Diagnosis Date    Anxiety     Disease of thyroid gland     Heart murmur      Social History     Social History    Marital status: /Civil Union     Spouse name: N/A    Number of children: N/A    Years of education: N/A     Occupational History    Not on file       Social History Main Topics    Smoking status: Former Smoker    Smokeless tobacco: Never Used    Alcohol use No      Comment: rarely    Drug use: No    Sexual activity: Not on file     Other Topics Concern    Not on file     Social History Narrative    No narrative on file      Family History   Problem Relation Age of Onset    Hypertension Father     Asthma Son     Arrhythmia Son      Past Surgical History:   Procedure Laterality Date    APPENDECTOMY      HEMORRHOID SURGERY      THYROID SURGERY      TOTAL ABDOMINAL HYSTERECTOMY      TUBAL LIGATION         Current Outpatient Prescriptions:     ALPRAZolam (XANAX) 0 5 mg tablet, Take by mouth, Disp: , Rfl:     Calcium Carbonate-Vit D-Min (CALTRATE 600+D PLUS MINERALS) 600-800 MG-UNIT TABS, Take by mouth daily  , Disp: , Rfl:     Cholecalciferol (VITAMIN D3) 1000 units CAPS, Take by mouth daily  , Disp: , Rfl:     levothyroxine 100 mcg tablet, Take by mouth daily  , Disp: , Rfl:     MAGNESIUM PO, Take by mouth daily  , Disp: , Rfl:     nizatidine (AXID) 150 MG capsule, 150 mg 2 (two) times a day, Disp: , Rfl: 5  No Known Allergies    Labs:  No visits with results within 2 Month(s) from this visit  Latest known visit with results is:   Hospital Outpatient Visit on 09/06/2018   Component Date Value    Protocol Name 09/06/2018 LEXISCAN-SIT     Time In Exercise Phase 09/06/2018 00:03:00     MAX  SYSTOLIC BP 13/83/4006 433     Max Diastolic Bp 49/46/5056 60     Max Heart Rate 09/06/2018 116     Max Predicted Heart Rate 09/06/2018 142     Reason for Termination 09/06/2018 Protocol Complete     Test Indication 09/06/2018 DYSPNEA, ROBERTSON     Target Hr Formular 09/06/2018 (220 - Age)*85%     Arrhy During Ex 09/06/2018 none     Chest Pain Statement 09/06/2018 none      Imaging: No results found      Review of Systems:  Review of Systems   REVIEW OF SYSTEMS:  Constitutional:  Denies fever or chills   Eyes:  Denies change in visual acuity   HENT:  Denies nasal congestion or sore throat   Respiratory:   shortness of breath   Cardiovascular:  Denies chest pain or edema   GI:  Denies abdominal pain, nausea, vomiting, bloody stools or diarrhea   :  Denies dysuria, frequency, difficulty in micturition and nocturia  Musculoskeletal:  Denies back pain or joint pain   Neurologic:  Denies headache, focal weakness or sensory changes   Endocrine:  Denies polyuria or polydipsia   Lymphatic:  Denies swollen glands   Psychiatric:  Denies depression or anxiety     Physical Exam:    /68   Pulse 83   Resp 18   Ht 4' 11" (1 499 m)   Wt 64 4 kg (142 lb)   SpO2 99%   BMI 28 68 kg/m²     Physical Exam PHYSICAL EXAM:  General:  Patient is not in acute distress   Head: Normocephalic, Atraumatic  HEENT:  Both pupils normal-size atraumatic, normocephalic, nonicteric  Neck:  JVP not raised  Trachea central  No carotid bruit  Respiratory:  normal breath sounds no crackles  no rhonchi  Cardiovascular:  Regular rate and rhythm no S3 no murmurs  GI:  Abdomen soft nontender  No organomegaly  Lymphatic:  No cervical or inguinal lymphadenopathy  Neurologic:  Patient is awake alert, oriented   Grossly nonfocal    Discussion/Summary:  Patient with symptoms of shortness of breath of unclear etiology with the history of abnormal stress test   Lengthy discussion with patient regarding further evaluation including cardiac catheterization  Risks and benefits of cardiac catheterization and possible revascularization options including but not limited to risk of infection, bleeding, risk of myocardial infarction, stroke, and risk of death discussed at length with patient  Patient understands the risks and benefits and wishes to proceed  Cardiac catheterization will be scheduled   Preprocedure workup will be done  Further cardiac evaluation and management after the results of cardiac catheterization  Patient instructed to take aspirin 81 mg daily  Patient is started on metoprolol 12 5 mg twice a day  Follow-up in 4 to 6 weeks or earlier as needed

## 2018-11-14 NOTE — TELEPHONE ENCOUNTER
----- Message from Tomi Gutierrez MD sent at 11/14/2018  8:52 AM EST -----  Regarding: Cardiac catheterization  Please schedule this patient for cardiac catheterization at [facility] the week after Thanksgiving  Diagnosis shortness of breath and abnormal stress test   Patient was given a slip for blood work  Patient denies any dye allergy  Thank you

## 2018-11-14 NOTE — TELEPHONE ENCOUNTER
----- Message from Jose Berrios MD sent at 11/14/2018  8:52 AM EST -----  Regarding: Cardiac catheterization  Please schedule this patient for cardiac catheterization at [facility] the week after Thanksgiving  Diagnosis shortness of breath and abnormal stress test   Patient was given a slip for blood work  Patient denies any dye allergy  Thank you

## 2018-11-23 ENCOUNTER — TELEPHONE (OUTPATIENT)
Dept: SURGERY | Facility: HOSPITAL | Age: 78
End: 2018-11-23

## 2018-11-23 ENCOUNTER — TELEPHONE (OUTPATIENT)
Dept: INTERVENTIONAL RADIOLOGY/VASCULAR | Facility: HOSPITAL | Age: 78
End: 2018-11-23

## 2018-11-23 RX ORDER — SODIUM CHLORIDE 9 MG/ML
50 INJECTION, SOLUTION INTRAVENOUS CONTINUOUS
Status: CANCELLED | OUTPATIENT
Start: 2018-11-26

## 2018-11-26 ENCOUNTER — HOSPITAL ENCOUNTER (OUTPATIENT)
Dept: INTERVENTIONAL RADIOLOGY/VASCULAR | Facility: HOSPITAL | Age: 78
Discharge: HOME | End: 2018-11-26
Attending: INTERNAL MEDICINE | Admitting: INTERNAL MEDICINE
Payer: MEDICARE

## 2018-11-26 ENCOUNTER — TELEPHONE (OUTPATIENT)
Dept: SURGERY | Facility: HOSPITAL | Age: 78
End: 2018-11-26

## 2018-11-26 VITALS
RESPIRATION RATE: 20 BRPM | HEART RATE: 62 BPM | SYSTOLIC BLOOD PRESSURE: 146 MMHG | OXYGEN SATURATION: 98 % | TEMPERATURE: 98.3 F | HEIGHT: 59 IN | BODY MASS INDEX: 28.58 KG/M2 | DIASTOLIC BLOOD PRESSURE: 67 MMHG | WEIGHT: 141.76 LBS

## 2018-11-26 DIAGNOSIS — R06.00 DYSPNEA ON EFFORT: ICD-10-CM

## 2018-11-26 DIAGNOSIS — R94.39 ABNORMAL STRESS TEST: ICD-10-CM

## 2018-11-26 PROCEDURE — 93571 IV DOP VEL&/PRESS C FLO 1ST: CPT | Performed by: INTERNAL MEDICINE

## 2018-11-26 PROCEDURE — 99153 MOD SED SAME PHYS/QHP EA: CPT | Performed by: INTERNAL MEDICINE

## 2018-11-26 PROCEDURE — 93458 L HRT ARTERY/VENTRICLE ANGIO: CPT | Performed by: INTERNAL MEDICINE

## 2018-11-26 PROCEDURE — C1769 GUIDE WIRE: HCPCS | Performed by: INTERNAL MEDICINE

## 2018-11-26 PROCEDURE — 99152 MOD SED SAME PHYS/QHP 5/>YRS: CPT | Performed by: INTERNAL MEDICINE

## 2018-11-26 PROCEDURE — C1894 INTRO/SHEATH, NON-LASER: HCPCS | Performed by: INTERNAL MEDICINE

## 2018-11-26 PROCEDURE — C1887 CATHETER, GUIDING: HCPCS | Performed by: INTERNAL MEDICINE

## 2018-11-26 RX ORDER — NITROGLYCERIN 20 MG/100ML
INJECTION INTRAVENOUS CODE/TRAUMA/SEDATION MEDICATION
Status: COMPLETED | OUTPATIENT
Start: 2018-11-26 | End: 2018-11-26

## 2018-11-26 RX ORDER — FENTANYL CITRATE 50 UG/ML
INJECTION, SOLUTION INTRAMUSCULAR; INTRAVENOUS CODE/TRAUMA/SEDATION MEDICATION
Status: COMPLETED | OUTPATIENT
Start: 2018-11-26 | End: 2018-11-26

## 2018-11-26 RX ORDER — SODIUM CHLORIDE 9 MG/ML
125 INJECTION, SOLUTION INTRAVENOUS CONTINUOUS
Status: DISPENSED | OUTPATIENT
Start: 2018-11-26 | End: 2018-11-26

## 2018-11-26 RX ORDER — LIDOCAINE HYDROCHLORIDE 10 MG/ML
INJECTION, SOLUTION INFILTRATION; PERINEURAL CODE/TRAUMA/SEDATION MEDICATION
Status: COMPLETED | OUTPATIENT
Start: 2018-11-26 | End: 2018-11-26

## 2018-11-26 RX ORDER — SODIUM CHLORIDE 9 MG/ML
50 INJECTION, SOLUTION INTRAVENOUS CONTINUOUS
Status: DISCONTINUED | OUTPATIENT
Start: 2018-11-26 | End: 2018-11-30 | Stop reason: HOSPADM

## 2018-11-26 RX ORDER — MIDAZOLAM HYDROCHLORIDE 1 MG/ML
INJECTION INTRAMUSCULAR; INTRAVENOUS CODE/TRAUMA/SEDATION MEDICATION
Status: COMPLETED | OUTPATIENT
Start: 2018-11-26 | End: 2018-11-26

## 2018-11-26 RX ORDER — VERAPAMIL HCL 2.5 MG/ML
AMPUL (ML) INTRAVENOUS CODE/TRAUMA/SEDATION MEDICATION
Status: COMPLETED | OUTPATIENT
Start: 2018-11-26 | End: 2018-11-26

## 2018-11-26 RX ORDER — HEPARIN SODIUM 1000 [USP'U]/ML
INJECTION, SOLUTION INTRAVENOUS; SUBCUTANEOUS CODE/TRAUMA/SEDATION MEDICATION
Status: COMPLETED | OUTPATIENT
Start: 2018-11-26 | End: 2018-11-26

## 2018-11-26 RX ADMIN — IOHEXOL 80 ML: 350 INJECTION, SOLUTION INTRAVENOUS at 12:57

## 2018-11-26 RX ADMIN — NITROGLYCERIN 200 MCG: 20 INJECTION INTRAVENOUS at 12:31

## 2018-11-26 RX ADMIN — FENTANYL CITRATE 50 MCG: 50 INJECTION, SOLUTION INTRAMUSCULAR; INTRAVENOUS at 12:29

## 2018-11-26 RX ADMIN — MIDAZOLAM HYDROCHLORIDE 1 MG: 1 INJECTION, SOLUTION INTRAMUSCULAR; INTRAVENOUS at 12:37

## 2018-11-26 RX ADMIN — LIDOCAINE HYDROCHLORIDE 2 ML: 10 INJECTION, SOLUTION INFILTRATION; PERINEURAL at 12:29

## 2018-11-26 RX ADMIN — MIDAZOLAM HYDROCHLORIDE 1 MG: 1 INJECTION, SOLUTION INTRAMUSCULAR; INTRAVENOUS at 12:28

## 2018-11-26 RX ADMIN — HEPARIN SODIUM 2000 UNITS: 1000 INJECTION, SOLUTION INTRAVENOUS; SUBCUTANEOUS at 12:45

## 2018-11-26 RX ADMIN — HEPARIN SODIUM 5000 UNITS: 1000 INJECTION, SOLUTION INTRAVENOUS; SUBCUTANEOUS at 12:39

## 2018-11-26 RX ADMIN — VERAPAMIL HYDROCHLORIDE 2.5 MG: 2.5 INJECTION, SOLUTION INTRAVENOUS at 12:31

## 2018-11-26 NOTE — H&P (VIEW-ONLY)
[facility] [place]  [address] [place] [address] [address] [place]  Cardiology Follow Up    Orly Santiago  1940  [id number]      1  Anxiety     2  Dyspnea on effort     3  Abnormal stress test         Chief Complaint   Patient presents with    Follow-up     Pt states to discuss cath       Interval History:  Patient presents for follow-up visit  Patient has been having symptoms of shortness of breath and she is not sure  Patient did have a pharmacological nuclear stress test a few months ago which showed some EKG changes on the Lexiscan stress test   Patient also had an anterior defect  No definite determination could be made as to whether this is a breast tissue attenuation versus ischemia  Patient states that her symptoms of shortness of breath a new presently  She states that she has been compliant with her diet as well as medications  Patient Active Problem List   Diagnosis    Anxiety    Dyspnea on effort    Abnormal stress test     Past Medical History:   Diagnosis Date    Anxiety     Disease of thyroid gland     Heart murmur      Social History     Social History    Marital status: /Civil Union     Spouse name: N/A    Number of children: N/A    Years of education: N/A     Occupational History    Not on file       Social History Main Topics    Smoking status: Former Smoker    Smokeless tobacco: Never Used    Alcohol use No      Comment: rarely    Drug use: No    Sexual activity: Not on file     Other Topics Concern    Not on file     Social History Narrative    No narrative on file      Family History   Problem Relation Age of Onset    Hypertension Father     Asthma Son     Arrhythmia Son      Past Surgical History:   Procedure Laterality Date    APPENDECTOMY      HEMORRHOID SURGERY      THYROID SURGERY      TOTAL ABDOMINAL HYSTERECTOMY      TUBAL LIGATION         Current Outpatient Prescriptions:     ALPRAZolam (XANAX) 0 5 mg tablet, Take by mouth, Disp: , Rfl:     Calcium Carbonate-Vit D-Min (CALTRATE 600+D PLUS MINERALS) 600-800 MG-UNIT TABS, Take by mouth daily  , Disp: , Rfl:     Cholecalciferol (VITAMIN D3) 1000 units CAPS, Take by mouth daily  , Disp: , Rfl:     levothyroxine 100 mcg tablet, Take by mouth daily  , Disp: , Rfl:     MAGNESIUM PO, Take by mouth daily  , Disp: , Rfl:     nizatidine (AXID) 150 MG capsule, 150 mg 2 (two) times a day, Disp: , Rfl: 5  No Known Allergies    Labs:  No visits with results within 2 Month(s) from this visit  Latest known visit with results is:   Hospital Outpatient Visit on 09/06/2018   Component Date Value    Protocol Name 09/06/2018 LEXISCAN-SIT     Time In Exercise Phase 09/06/2018 00:03:00     MAX  SYSTOLIC BP 68/20/4383 293     Max Diastolic Bp 71/56/4677 60     Max Heart Rate 09/06/2018 116     Max Predicted Heart Rate 09/06/2018 142     Reason for Termination 09/06/2018 Protocol Complete     Test Indication 09/06/2018 DYSPNEA, ROBERTSON     Target Hr Formular 09/06/2018 (220 - Age)*85%     Arrhy During Ex 09/06/2018 none     Chest Pain Statement 09/06/2018 none      Imaging: No results found      Review of Systems:  Review of Systems   REVIEW OF SYSTEMS:  Constitutional:  Denies fever or chills   Eyes:  Denies change in visual acuity   HENT:  Denies nasal congestion or sore throat   Respiratory:   shortness of breath   Cardiovascular:  Denies chest pain or edema   GI:  Denies abdominal pain, nausea, vomiting, bloody stools or diarrhea   :  Denies dysuria, frequency, difficulty in micturition and nocturia  Musculoskeletal:  Denies back pain or joint pain   Neurologic:  Denies headache, focal weakness or sensory changes   Endocrine:  Denies polyuria or polydipsia   Lymphatic:  Denies swollen glands   Psychiatric:  Denies depression or anxiety     Physical Exam:    /68   Pulse 83   Resp 18   Ht 4' 11" (1 499 m)   Wt 64 4 kg (142 lb)   SpO2 99%   BMI 28 68 kg/m²     Physical Exam PHYSICAL EXAM:  General:  Patient is not in acute distress   Head: Normocephalic, Atraumatic  HEENT:  Both pupils normal-size atraumatic, normocephalic, nonicteric  Neck:  JVP not raised  Trachea central  No carotid bruit  Respiratory:  normal breath sounds no crackles  no rhonchi  Cardiovascular:  Regular rate and rhythm no S3 no murmurs  GI:  Abdomen soft nontender  No organomegaly  Lymphatic:  No cervical or inguinal lymphadenopathy  Neurologic:  Patient is awake alert, oriented   Grossly nonfocal    Discussion/Summary:  Patient with symptoms of shortness of breath of unclear etiology with the history of abnormal stress test   Lengthy discussion with patient regarding further evaluation including cardiac catheterization  Risks and benefits of cardiac catheterization and possible revascularization options including but not limited to risk of infection, bleeding, risk of myocardial infarction, stroke, and risk of death discussed at length with patient  Patient understands the risks and benefits and wishes to proceed  Cardiac catheterization will be scheduled   Preprocedure workup will be done  Further cardiac evaluation and management after the results of cardiac catheterization  Patient instructed to take aspirin 81 mg daily  Patient is started on metoprolol 12 5 mg twice a day  Follow-up in 4 to 6 weeks or earlier as needed

## 2018-11-26 NOTE — INTERVAL H&P NOTE
Update: (This section must be completed if the H&P was completed greater than 24 hrs to procedure or admission)    H&P reviewed  After examining the patient, I find no changed to the H&P since it had been written  Patient re-evaluated   Accept as history and physical     Kip Krabbe, DO/November 26, 2018/12:12 PM

## 2018-11-26 NOTE — DISCHARGE INSTRUCTIONS
After Radial Heart Catheterization   WHAT YOU NEED TO KNOW:   What will happen after a radial heart catheterization? · You will be attached to a heart monitor until you are fully awake  A heart monitor is an EKG that stays on continuously to record your heart's electrical activity  Healthcare providers will monitor your vital signs and pulses in your arm  They will frequently check your pressure bandage for bleeding or swelling  · You may have a band wrapped tightly around your wrist  The band puts pressure on your wound and helps prevent bleeding  A healthcare provider can put air into the band or remove air from the band  A healthcare provider will gradually remove air from the band and decrease pressure on your wrist  The band may be removed in 2 hours or when your wound stops bleeding  · You will need to keep your wrist straight for 2 to 4 hours  Do not  push or pull with your arm  Arm movements can cause serious bleeding  After you are monitored for several hours, you may go home or may need to stay in the hospital overnight  What do I need to know before I go home? · Care for your wound as directed  Remove the pressure bandage in 24 hours or as directed  Mild bruising is normal and expected  A small bandage can be placed on your wound after you remove the pressure bandage  Do not put powders, lotions, or creams on your wound  They may cause your wound to get infected  Monitor your wound every day for signs of infection, such as redness, swelling, or pus  · Shower the day after your procedure or as directed  Remove your pressure bandage before you shower  Do not take baths or go in hot tubs or pools  Carefully wash the wound with soap and water  Pat the area dry  A small bandage can be placed on your wound after you shower  · Apply firm, steady pressure to your wound if it bleeds  Apply pressure with a clean gauze or towel for 5 to 10 minutes   Call 911 if bleeding becomes heavy or does not stop  · Drink liquids as directed  Liquids will help flush the contrast liquid from your body  Ask how much liquid to drink each day and which liquids are best for you  · Do not lift anything heavier than 5 pounds until directed by your healthcare provider  Heavy lifting can put stress on your wound and cause bleeding  Do not push or pull with the arm that was used for the procedure  Do not do vigorous activity for at least 48 hours  Vigorous activity may cause bleeding from your wound  Rest and do quiet activities  Take short walks around the house to prevent a blood clot  Ask your healthcare provider when you can return to your normal activities  · Do not drive or return to work until your healthcare provider says it is okay  Your healthcare provider may tell you to wait 48 hours before you drive to decrease your risk for bleeding  You may not be able to return to work for at least 2 days after your procedure if your job involves heavy lifting  What medicines may I need? You may need any of the following:  · Blood thinners    help prevent blood clots  Examples of blood thinners include heparin and warfarin  Clots can cause strokes, heart attacks, and death  The following are general safety guidelines to follow while you are taking a blood thinner:    ¨ Watch for bleeding and bruising while you take blood thinners  Watch for bleeding from your gums or nose  Watch for blood in your urine and bowel movements  Use a soft washcloth on your skin, and a soft toothbrush to brush your teeth  This can keep your skin and gums from bleeding  If you shave, use an electric shaver  Do not play contact sports  ¨ Tell your dentist and other healthcare providers that you take anticoagulants  Wear a bracelet or necklace that says you take this medicine  ¨ Do not start or stop any medicines unless your healthcare provider tells you to  Many medicines cannot be used with blood thinners       ¨ Tell your healthcare provider right away if you forget to take the medicine, or if you take too much  ¨ Warfarin  is a blood thinner that you may need to take  The following are things you should be aware of if you take warfarin  § Foods and medicines can affect the amount of warfarin in your blood  Do not make major changes to your diet while you take warfarin  Warfarin works best when you eat about the same amount of vitamin K every day  Vitamin K is found in green leafy vegetables and certain other foods  Ask for more information about what to eat when you are taking warfarin  § You will need to see your healthcare provider for follow-up visits when you are on warfarin  You will need regular blood tests  These tests are used to decide how much medicine you need  · Acetaminophen  helps decrease pain and fever  This medicine is available without a doctor's order  Ask how much medicine is safe to take, and how often to take it  Acetaminophen can cause liver damage if not taken correctly  · Take your medicine as directed  Contact your healthcare provider if you think your medicine is not helping or if you have side effects  Tell him or her if you are allergic to any medicine  Keep a list of the medicines, vitamins, and herbs you take  Include the amounts, and when and why you take them  Bring the list or the pill bottles to follow-up visits  Carry your medicine list with you in case of an emergency    Call 911 for any of the following:   · You have any of the following signs of a heart attack:      ¨ Squeezing, pressure, or pain in your chest that lasts longer than 5 minutes or returns    ¨ Discomfort or pain in your back, neck, jaw, stomach, or arm     ¨ Trouble breathing    ¨ Nausea or vomiting    ¨ Lightheadedness or a sudden cold sweat, especially with chest pain or trouble breathing    · You have any of the following signs of a stroke:      ¨ Numbness or drooping on one side of your face ¨ Weakness in an arm or leg    ¨ Confusion or difficulty speaking    ¨ Dizziness, a severe headache, or vision loss    · You feel lightheaded, short of breath, and have chest pain  · You cough up blood  · You have trouble breathing  · You cannot stop the bleeding from your wound even after you hold firm pressure for 10 minutes  When should I seek immediate care? · Blood soaks through your bandage  · Your stitches come apart  · Your hand or arm feels numb, cool, or looks pale  · Your wound gets swollen quickly  When should I contact my healthcare provider? · You have a fever or chills  · Your wound is red, swollen, or draining pus  · Your wound looks more bruised or you have new bruising on the side of your wrist      · You have nausea or are vomiting  · Your skin is itchy, swollen, or you have a rash  · You have questions or concerns about your condition or care  CARE AGREEMENT:   You have the right to help plan your care  Learn about your health condition and how it may be treated  Discuss treatment options with your caregivers to decide what care you want to receive  You always have the right to refuse treatment  The above information is an  only  It is not intended as medical advice for individual conditions or treatments  Talk to your doctor, nurse or pharmacist before following any medical regimen to see if it is safe and effective for you  © 2017 [facility] Information is for End User's use only and may not be sold, redistributed or otherwise used for commercial purposes  All illustrations and images included in CareNotes® are the copyrighted property of [employer]  or [employer]

## 2018-11-27 ENCOUNTER — TELEPHONE (OUTPATIENT)
Dept: CARDIOLOGY CLINIC | Facility: CLINIC | Age: 78
End: 2018-11-27

## 2018-11-28 DIAGNOSIS — E78.5 HYPERLIPIDEMIA, UNSPECIFIED HYPERLIPIDEMIA TYPE: Primary | ICD-10-CM

## 2018-11-28 RX ORDER — ATORVASTATIN CALCIUM 20 MG/1
20 TABLET, FILM COATED ORAL DAILY
Qty: 90 TABLET | Refills: 3 | Status: SHIPPED | OUTPATIENT
Start: 2018-11-28 | End: 2018-12-19 | Stop reason: SDUPTHER

## 2018-11-28 NOTE — TELEPHONE ENCOUNTER
Patient to continue aspirin and metoprolol as prescribed  To add atorvastatin 20 mg p  O  Daily for cholesterol reducing agent to help decrease the process of atherosclerosis and slow the progression  Please explain the side effects of muscle pain muscle cramps as well as abnormal liver function tests  To check lipid profile and CMP in 3 months  Patient will need a script for atorvastatin as well as a slip for blood work to be done in 3 months

## 2018-12-10 ENCOUNTER — OFFICE VISIT (OUTPATIENT)
Dept: OBGYN CLINIC | Age: 78
End: 2018-12-10
Payer: MEDICARE

## 2018-12-10 VITALS
SYSTOLIC BLOOD PRESSURE: 124 MMHG | DIASTOLIC BLOOD PRESSURE: 86 MMHG | BODY MASS INDEX: 29.03 KG/M2 | HEIGHT: 59 IN | WEIGHT: 144 LBS

## 2018-12-10 DIAGNOSIS — M85.80 OSTEOPENIA, UNSPECIFIED LOCATION: ICD-10-CM

## 2018-12-10 DIAGNOSIS — Z12.31 ENCOUNTER FOR SCREENING MAMMOGRAM FOR MALIGNANT NEOPLASM OF BREAST: ICD-10-CM

## 2018-12-10 DIAGNOSIS — Z01.419 ENCOUNTER FOR GYNECOLOGICAL EXAMINATION WITHOUT ABNORMAL FINDING: Primary | ICD-10-CM

## 2018-12-10 PROCEDURE — G0101 CA SCREEN;PELVIC/BREAST EXAM: HCPCS | Performed by: OBSTETRICS & GYNECOLOGY

## 2018-12-10 NOTE — PATIENT INSTRUCTIONS
Wellness Visit for Adults   WHAT YOU NEED TO KNOW:   What is a wellness visit? A wellness visit is when you see your healthcare provider to get screened for health problems  You can also get advice on how to stay healthy  Write down your questions so you remember to ask them  Ask your healthcare provider how often you should have a wellness visit  What happens at a wellness visit? Your healthcare provider will ask about your health, and your family history of health problems  This includes high blood pressure, heart disease, and cancer  He or she will ask if you have symptoms that concern you, if you smoke, and about your mood  You may also be asked about your intake of medicines, supplements, food, and alcohol  Any of the following may be done:  · Your weight  will be checked  Your height may also be checked so your body mass index (BMI) can be calculated  Your BMI shows if you are at a healthy weight  · Your blood pressure  and heart rate will be checked  Your temperature may also be checked  · Blood and urine tests  may be done  Blood tests may be done to check your cholesterol levels  Abnormal cholesterol levels increase your risk for heart disease and stroke  You may also need a blood or urine test to check for diabetes if you are at increased risk  Urine tests may be done to look for signs of an infection or kidney disease  · A physical exam  includes checking your heartbeat and lungs with a stethoscope  Your healthcare provider may also check your skin to look for sun damage  · Screening tests  may be recommended  A screening test is done to check for diseases that may not cause symptoms  The screening tests you may need depend on your age, gender, family history, and lifestyle habits  For example, colorectal screening may be recommended if you are 48years old or older  What screening tests do I need if I am a woman? · A Pap smear  is used to screen for cervical cancer   Pap smears are usually done every 3 to 5 years depending on your age  You may need them more often if you have had abnormal Pap smear test results in the past  Ask your healthcare provider how often you should have a Pap smear  · A mammogram  is an x-ray of your breasts to screen for breast cancer  Experts recommend mammograms every 2 years starting at age 48 years  You may need a mammogram at age 52 years or younger if you have an increased risk for breast cancer  Talk to your healthcare provider about when you should start having mammograms and how often you need them  What vaccines might I need? · Get an influenza vaccine  every year  The influenza vaccine protects you from the flu  Several types of viruses cause the flu  The viruses change over time, so new vaccines are made each year  · Get a tetanus-diphtheria (Td) booster vaccine  every 10 years  This vaccine protects you against tetanus and diphtheria  Tetanus is a severe infection that may cause painful muscle spasms and lockjaw  Diphtheria is a severe bacterial infection that causes a thick covering in the back of your mouth and throat  · Get a human papillomavirus (HPV) vaccine  if you are female and aged 23 to 32 or male 23 to 24 and never received it  This vaccine protects you from HPV infection  HPV is the most common infection spread by sexual contact  HPV may also cause vaginal, penile, and anal cancers  · Get a pneumococcal vaccine  if you are aged 72 years or older  The pneumococcal vaccine is an injection given to protect you from pneumococcal disease  Pneumococcal disease is an infection caused by pneumococcal bacteria  The infection may cause pneumonia, meningitis, or an ear infection  · Get a shingles vaccine  if you are aged 61 or older, even if you have had shingles before  The shingles vaccine is an injection to protect you from the varicella-zoster virus  This is the same virus that causes chickenpox   Shingles is a painful rash that develops in people who had chickenpox or have been exposed to the virus  How can I eat healthy? My Plate is a model for planning healthy meals  It shows the types and amounts of foods that should go on your plate  Fruits and vegetables make up about half of your plate, and grains and protein make up the other half  A serving of dairy is included on the side of your plate  The amount of calories and serving sizes you need depends on your age, gender, weight, and height  Examples of healthy foods are listed below:  · Eat a variety of vegetables  such as dark green, red, and orange vegetables  You can also include canned vegetables low in sodium (salt) and frozen vegetables without added butter or sauces  · Eat a variety of fresh fruits , canned fruit in 100% juice, frozen fruit, and dried fruit  · Include whole grains  At least half of the grains you eat should be whole grains  Examples include whole-wheat bread, wheat pasta, brown rice, and whole-grain cereals such as oatmeal     · Eat a variety of protein foods such as seafood (fish and shellfish), lean meat, and poultry without skin (turkey and chicken)  Examples of lean meats include pork leg, shoulder, or tenderloin, and beef round, sirloin, tenderloin, and extra lean ground beef  Other protein foods include eggs and egg substitutes, beans, peas, soy products, nuts, and seeds  · Choose low-fat dairy products such as skim or 1% milk or low-fat yogurt, cheese, and cottage cheese  · Limit unhealthy fats  such as butter, hard margarine, and shortening  How much exercise do I need? Exercise at least 30 minutes per day on most days of the week  Some examples of exercise include walking, biking, dancing, and swimming  You can also fit in more physical activity by taking the stairs instead of the elevator or parking farther away from stores  Include muscle strengthening activities 2 days each week  Regular exercise provides many health benefits  It helps you manage your weight, and decreases your risk for type 2 diabetes, heart disease, stroke, and high blood pressure  Exercise can also help improve your mood  Ask your healthcare provider about the best exercise plan for you  What are some general health and safety guidelines I should follow? · Do not smoke  Nicotine and other chemicals in cigarettes and cigars can cause lung damage  Ask your healthcare provider for information if you currently smoke and need help to quit  E-cigarettes or smokeless tobacco still contain nicotine  Talk to your healthcare provider before you use these products  · Limit alcohol  A drink of alcohol is 12 ounces of beer, 5 ounces of wine, or 1½ ounces of liquor  · Lose weight, if needed  Being overweight increases your risk of certain health conditions  These include heart disease, high blood pressure, type 2 diabetes, and certain types of cancer  · Protect your skin  Do not sunbathe or use tanning beds  Use sunscreen with a SPF 15 or higher  Apply sunscreen at least 15 minutes before you go outside  Reapply sunscreen every 2 hours  Wear protective clothing, hats, and sunglasses when you are outside  · Drive safely  Always wear your seatbelt  Make sure everyone in your car wears a seatbelt  A seatbelt can save your life if you are in an accident  Do not use your cell phone when you are driving  This could distract you and cause an accident  Pull over if you need to make a call or send a text message  · Practice safe sex  Use latex condoms if are sexually active and have more than one partner  Your healthcare provider may recommend screening tests for sexually transmitted infections (STIs)  · Wear helmets, lifejackets, and protective gear  Always wear a helmet when you ride a bike or motorcycle, go skiing, or play sports that could cause a head injury  Wear protective equipment when you play sports   Wear a lifejacket when you are on a boat or doing water sports  CARE AGREEMENT:   You have the right to help plan your care  Learn about your health condition and how it may be treated  Discuss treatment options with your caregivers to decide what care you want to receive  You always have the right to refuse treatment  The above information is an  only  It is not intended as medical advice for individual conditions or treatments  Talk to your doctor, nurse or pharmacist before following any medical regimen to see if it is safe and effective for you  © 2017 [employer] Information is for End User's use only and may not be sold, redistributed or otherwise used for commercial purposes  All illustrations and images included in CareNotes® are the copyrighted property of [employer]  [employer]

## 2018-12-10 NOTE — ASSESSMENT & PLAN NOTE
Pap/HPV not indicated  Mammogram ordered  Colonoscopy current  DEXA Osteopenia due 2020 Encourage healthy diet, exercise, Calcium 1200mg per day and at least 800 iu Vitamin D daily

## 2018-12-10 NOTE — PROGRESS NOTES
Assessment/Plan:    Encounter for gynecological examination without abnormal finding  Pap/HPV not indicated  Mammogram ordered  Colonoscopy current  DEXA Osteopenia due  Encourage healthy diet, exercise, Calcium 1200mg per day and at least 800 iu Vitamin D daily  Diagnoses and all orders for this visit:    Encounter for gynecological examination without abnormal finding    Encounter for screening mammogram for malignant neoplasm of breast  -     Mammo screening bilateral w 3d & cad; Future    Osteopenia, unspecified location  -     Cancel: DXA bone density spine hip and pelvis; Future          Subjective:      Patient ID: Evette Benites is a 77 y o  female  Patient here for new patient yearly exam  No current complaints at this time  Age of first period: 14years old    Lmp: NAN ()  Last pap: 16 negative,hpv-  Last mammo: 18 BR1- (3D)  Colonoscopy:  (due )  Dexa: 17 osteopenia (due )  Patient is not a smoker  Patient is not a drinker  Hysterectomy over 20 years ago- [facility] BSO  Due to pelvic pain      Recent evaluation due to chest pain - s/p stress test  Does get symptom of hot flash once and awhile which is accompanied with palpitaion/sometimes jaw pain  Queried if this is related to menopause - at this point this symptoms is not related to hormonal changes  She needs to continue to follow up with her cardiologist      Gynecologic Exam   The patient's pertinent negatives include no genital itching, genital lesions, genital odor, genital rash, pelvic pain, vaginal bleeding or vaginal discharge  The patient is experiencing no pain  Pertinent negatives include no chills, constipation, diarrhea, fever, nausea, sore throat or vomiting  The following portions of the patient's history were reviewed and updated as appropriate:   She  has a past medical history of Anxiety; Disease of thyroid gland; Fibroid; GERD (gastroesophageal reflux disease);  Heart murmur; Hypertension; Osteoporosis; and Shortness of breath  She   Patient Active Problem List    Diagnosis Date Noted    Encounter for gynecological examination without abnormal finding 12/10/2018    Osteopenia 12/10/2018    Abnormal stress test 09/17/2018    Anxiety 08/09/2018    Dyspnea on effort 08/09/2018    Murmur 02/17/2016     She  has a past surgical history that includes Appendectomy; Hemorrhoid surgery; Thyroid surgery; Total abdominal hysterectomy; and Tubal ligation  Her family history includes Arrhythmia in her son; Asthma in her son; Hypertension in her father  She  reports that she has never smoked  She has never used smokeless tobacco  She reports that she does not drink alcohol or use drugs  Current Outpatient Prescriptions   Medication Sig Dispense Refill    ALPRAZolam (XANAX) 0 5 mg tablet Take by mouth      aspirin (ECOTRIN LOW STRENGTH) 81 mg EC tablet Take 1 tablet (81 mg total) by mouth daily 30 tablet 0    atorvastatin (LIPITOR) 20 mg tablet Take 1 tablet (20 mg total) by mouth daily 90 tablet 3    Calcium Carbonate-Vit D-Min (CALTRATE 600+D PLUS MINERALS) 600-800 MG-UNIT TABS Take by mouth daily        Cholecalciferol (VITAMIN D3) 1000 units CAPS Take by mouth daily        levothyroxine 100 mcg tablet Take by mouth daily        MAGNESIUM PO Take by mouth daily        metoprolol tartrate (LOPRESSOR) 25 mg tablet 1/2 tab twice a day 30 tablet 0    nizatidine (AXID) 150 MG capsule 150 mg 2 (two) times a day  5     No current facility-administered medications for this visit        Current Outpatient Prescriptions on File Prior to Visit   Medication Sig    ALPRAZolam (XANAX) 0 5 mg tablet Take by mouth    aspirin (ECOTRIN LOW STRENGTH) 81 mg EC tablet Take 1 tablet (81 mg total) by mouth daily    atorvastatin (LIPITOR) 20 mg tablet Take 1 tablet (20 mg total) by mouth daily    Calcium Carbonate-Vit D-Min (CALTRATE 600+D PLUS MINERALS) 600-800 MG-UNIT TABS Take by mouth daily      Cholecalciferol (VITAMIN D3) 1000 units CAPS Take by mouth daily      levothyroxine 100 mcg tablet Take by mouth daily      MAGNESIUM PO Take by mouth daily      metoprolol tartrate (LOPRESSOR) 25 mg tablet 1/2 tab twice a day    nizatidine (AXID) 150 MG capsule 150 mg 2 (two) times a day     No current facility-administered medications on file prior to visit  She has No Known Allergies       Review of Systems   Constitutional: Negative for activity change, appetite change, chills, fatigue and fever  HENT: Negative for rhinorrhea, sneezing and sore throat  Eyes: Negative for visual disturbance  Respiratory: Negative for cough, shortness of breath and wheezing  Cardiovascular: Negative for chest pain, palpitations and leg swelling  Gastrointestinal: Negative for abdominal distention, constipation, diarrhea, nausea and vomiting  Genitourinary: Negative for difficulty urinating, pelvic pain and vaginal discharge  Neurological: Negative for syncope and light-headedness  Objective:      /86 (BP Location: Right arm, Patient Position: Sitting, Cuff Size: Standard)   Ht 4' 11" (1 499 m)   Wt 65 3 kg (144 lb)   LMP  (LMP Unknown)   Breastfeeding? No   BMI 29 08 kg/m²          Physical Exam   Genitourinary: No breast swelling, tenderness, discharge or bleeding  There is no rash, tenderness, lesion or injury on the right labia  There is no rash, tenderness, lesion or injury on the left labia  Right adnexum displays no mass, no tenderness and no fullness  Left adnexum displays no mass, no tenderness and no fullness  No bleeding in the vagina  No vaginal discharge found

## 2018-12-19 ENCOUNTER — OFFICE VISIT (OUTPATIENT)
Dept: CARDIOLOGY CLINIC | Facility: CLINIC | Age: 78
End: 2018-12-19
Payer: MEDICARE

## 2018-12-19 VITALS
WEIGHT: 144 LBS | SYSTOLIC BLOOD PRESSURE: 132 MMHG | OXYGEN SATURATION: 99 % | HEART RATE: 83 BPM | HEIGHT: 59 IN | BODY MASS INDEX: 29.03 KG/M2 | DIASTOLIC BLOOD PRESSURE: 58 MMHG

## 2018-12-19 DIAGNOSIS — R06.00 DYSPNEA ON EFFORT: ICD-10-CM

## 2018-12-19 DIAGNOSIS — F41.9 ANXIETY: ICD-10-CM

## 2018-12-19 DIAGNOSIS — E78.5 HYPERLIPIDEMIA, UNSPECIFIED HYPERLIPIDEMIA TYPE: Primary | ICD-10-CM

## 2018-12-19 DIAGNOSIS — R94.39 ABNORMAL STRESS TEST: ICD-10-CM

## 2018-12-19 DIAGNOSIS — I25.10 CORONARY ARTERY DISEASE INVOLVING NATIVE CORONARY ARTERY OF NATIVE HEART WITHOUT ANGINA PECTORIS: ICD-10-CM

## 2018-12-19 PROCEDURE — 99214 OFFICE O/P EST MOD 30 MIN: CPT | Performed by: INTERNAL MEDICINE

## 2018-12-19 RX ORDER — ATORVASTATIN CALCIUM 20 MG/1
20 TABLET, FILM COATED ORAL DAILY
Qty: 90 TABLET | Refills: 0
Start: 2018-12-19 | End: 2019-05-15 | Stop reason: SDUPTHER

## 2018-12-19 NOTE — PROGRESS NOTES
[facility]  [address] [address] [place]  Cardiology Follow Up    Marielle Garcia  1940  [id number]      1  Hyperlipidemia, unspecified hyperlipidemia type  atorvastatin (LIPITOR) 20 mg tablet    Lipid panel   2  Coronary artery disease involving native coronary artery of native heart without angina pectoris  Comprehensive metabolic panel   3  Anxiety     4  Dyspnea on effort  metoprolol tartrate (LOPRESSOR) 25 mg tablet    Comprehensive metabolic panel   5  Abnormal stress test  metoprolol tartrate (LOPRESSOR) 25 mg tablet       Chief Complaint   Patient presents with    Follow-up            Interval History:  Patient presents for follow-up visit  Patient had abnormal stress test and subsequently underwent cardiac catheterization which showed 50% lad stenosis  Patient also had IFR which was not significant  Patient is on medical therapy  Patient is on aspirin statins and beta-blockers  Patient denies any chest pain presently  She states that she has been compliant with all her present medications  She is trying to watch her diet carefully  Patient Active Problem List   Diagnosis    Anxiety    Dyspnea on effort    Abnormal stress test    Murmur    Encounter for gynecological examination without abnormal finding    Osteopenia    Hyperlipidemia    Coronary artery disease involving native coronary artery of native heart without angina pectoris     Past Medical History:   Diagnosis Date    Anxiety     Disease of thyroid gland     Fibroid     GERD (gastroesophageal reflux disease)     Heart murmur     Hypertension     Osteoporosis     Shortness of breath      Social History     Social History    Marital status: /Civil Union     Spouse name: N/A    Number of children: N/A    Years of education: N/A     Occupational History    Not on file       Social History Main Topics    Smoking status: Never Smoker    Smokeless tobacco: Never Used   Bladimir Abt Alcohol use No      Comment: rarely    Drug use: No    Sexual activity: No     Other Topics Concern    Not on file     Social History Narrative    No narrative on file      Family History   Problem Relation Age of Onset    Hypertension Father     Asthma Son     Arrhythmia Son      Past Surgical History:   Procedure Laterality Date    APPENDECTOMY      HEMORRHOID SURGERY      THYROID SURGERY      TOTAL ABDOMINAL HYSTERECTOMY      TUBAL LIGATION         Current Outpatient Prescriptions:     ALPRAZolam (XANAX) 0 5 mg tablet, Take by mouth, Disp: , Rfl:     aspirin (ECOTRIN LOW STRENGTH) 81 mg EC tablet, Take 1 tablet (81 mg total) by mouth daily, Disp: 30 tablet, Rfl: 0    atorvastatin (LIPITOR) 20 mg tablet, Take 1 tablet (20 mg total) by mouth daily, Disp: 90 tablet, Rfl: 0    Cholecalciferol (VITAMIN D3) 1000 units CAPS, Take by mouth daily  , Disp: , Rfl:     levothyroxine 100 mcg tablet, Take by mouth daily  , Disp: , Rfl:     MAGNESIUM PO, Take by mouth daily  , Disp: , Rfl:     metoprolol tartrate (LOPRESSOR) 25 mg tablet, 1/2 tab twice a day, Disp: 30 tablet, Rfl: 5    nizatidine (AXID) 150 MG capsule, 150 mg 2 (two) times a day, Disp: , Rfl: 5    Calcium Carbonate-Vit D-Min (CALTRATE 600+D PLUS MINERALS) 600-800 MG-UNIT TABS, Take by mouth daily  , Disp: , Rfl:   No Known Allergies    Labs:  Office Visit on 11/14/2018   Component Date Value    WBC 11/14/2018 5 47     RBC 11/14/2018 4 40     Hemoglobin 11/14/2018 13 2     Hematocrit 11/14/2018 42 5     MCV 11/14/2018 97     MCH 11/14/2018 30 0     MCHC 11/14/2018 31 1*    RDW 11/14/2018 12 6     MPV 11/14/2018 9 6     Platelets 71/89/3332 348     nRBC 11/14/2018 0     Neutrophils Relative 11/14/2018 46     Immat GRANS % 11/14/2018 0     Lymphocytes Relative 11/14/2018 38     Monocytes Relative 11/14/2018 11     Eosinophils Relative 11/14/2018 3     Basophils Relative 11/14/2018 2*    Neutrophils Absolute 11/14/2018 2 56  Immature Grans Absolute 11/14/2018 0 01     Lymphocytes Absolute 11/14/2018 2 06     Monocytes Absolute 11/14/2018 0 58     Eosinophils Absolute 11/14/2018 0 18     Basophils Absolute 11/14/2018 0 08     Sodium 11/14/2018 137     Potassium 11/14/2018 3 9     Chloride 11/14/2018 105     CO2 11/14/2018 28     ANION GAP 11/14/2018 4     BUN 11/14/2018 19     Creatinine 11/14/2018 0 96     Glucose 11/14/2018 74     Calcium 11/14/2018 9 2     eGFR 11/14/2018 56     Protime 11/14/2018 13 3     INR 11/14/2018 1 00      Imaging: No results found  Review of Systems:  Review of Systems   REVIEW OF SYSTEMS:  Constitutional:  Denies fever or chills   Eyes:  Denies change in visual acuity   HENT:  Denies nasal congestion or sore throat   Respiratory:  Denies cough or shortness of breath   Cardiovascular:  Denies chest pain or edema   GI:  Denies abdominal pain, nausea, vomiting, bloody stools or diarrhea   :  Denies dysuria, frequency, difficulty in micturition and nocturia  Musculoskeletal:  Denies back pain or joint pain   Neurologic:  Denies headache, focal weakness or sensory changes   Endocrine:  Denies polyuria or polydipsia   Lymphatic:  Denies swollen glands   Psychiatric:   anxiety     Physical Exam:    /58   Pulse 83   Ht 4' 11" (1 499 m)   Wt 65 3 kg (144 lb)   LMP  (LMP Unknown)   SpO2 99%   BMI 29 08 kg/m²     Physical Exam   PHYSICAL EXAM:  General:  Patient is not in acute distress   Head: Normocephalic, Atraumatic  HEENT:  Both pupils normal-size atraumatic, normocephalic, nonicteric  Neck:  JVP not raised  Trachea central  No carotid bruit  Respiratory:  normal breath sounds no crackles  no rhonchi  Cardiovascular:  Regular rate and rhythm no S3 no murmurs  GI:  Abdomen soft nontender  No organomegaly  Lymphatic:  No cervical or inguinal lymphadenopathy  Neurologic:  Patient is awake alert, oriented    Grossly nonfocal    Discussion/Summary:  Patient with the history of abnormal stress test with recent cardiac catheterization showing 50% lad stenosis  Lengthy discussion with patient regarding the results of cardiac catheterization  Symptoms to watch out from cardiac standpoint which would indicate the need for further cardiac evaluation discussed with the patient  Medications reviewed  Side effects explained  Patient report any bleeding issues  Importance of dietary and risk factor modification reinforced  Follow-up in 6 months or earlier as needed  Follow-up with primary care physician  Patient is agreeable with the plan of care  Emotional support provided to the patient with history of anxiety

## 2019-05-06 ENCOUNTER — TELEPHONE (OUTPATIENT)
Dept: CARDIOLOGY CLINIC | Facility: CLINIC | Age: 79
End: 2019-05-06

## 2019-05-15 ENCOUNTER — OFFICE VISIT (OUTPATIENT)
Dept: CARDIOLOGY CLINIC | Facility: CLINIC | Age: 79
End: 2019-05-15
Payer: MEDICARE

## 2019-05-15 VITALS
SYSTOLIC BLOOD PRESSURE: 134 MMHG | OXYGEN SATURATION: 98 % | DIASTOLIC BLOOD PRESSURE: 64 MMHG | BODY MASS INDEX: 29.23 KG/M2 | HEART RATE: 75 BPM | WEIGHT: 145 LBS | HEIGHT: 59 IN

## 2019-05-15 DIAGNOSIS — F41.9 ANXIETY: ICD-10-CM

## 2019-05-15 DIAGNOSIS — E78.5 HYPERLIPIDEMIA, UNSPECIFIED HYPERLIPIDEMIA TYPE: ICD-10-CM

## 2019-05-15 DIAGNOSIS — I25.10 CORONARY ARTERY DISEASE INVOLVING NATIVE CORONARY ARTERY OF NATIVE HEART WITHOUT ANGINA PECTORIS: Primary | ICD-10-CM

## 2019-05-15 DIAGNOSIS — R06.00 DYSPNEA ON EFFORT: ICD-10-CM

## 2019-05-15 DIAGNOSIS — E78.2 MIXED HYPERLIPIDEMIA: ICD-10-CM

## 2019-05-15 DIAGNOSIS — R94.39 ABNORMAL STRESS TEST: ICD-10-CM

## 2019-05-15 PROCEDURE — 99213 OFFICE O/P EST LOW 20 MIN: CPT | Performed by: INTERNAL MEDICINE

## 2019-05-15 RX ORDER — ATORVASTATIN CALCIUM 20 MG/1
20 TABLET, FILM COATED ORAL DAILY
Qty: 90 TABLET | Refills: 3 | Status: SHIPPED | OUTPATIENT
Start: 2019-05-15 | End: 2019-11-08 | Stop reason: SDUPTHER

## 2019-06-03 DIAGNOSIS — R06.00 DYSPNEA ON EFFORT: ICD-10-CM

## 2019-06-03 DIAGNOSIS — R94.39 ABNORMAL STRESS TEST: ICD-10-CM

## 2019-11-08 DIAGNOSIS — E78.5 HYPERLIPIDEMIA, UNSPECIFIED HYPERLIPIDEMIA TYPE: ICD-10-CM

## 2019-11-08 RX ORDER — ATORVASTATIN CALCIUM 20 MG/1
20 TABLET, FILM COATED ORAL DAILY
Qty: 90 TABLET | Refills: 3 | Status: SHIPPED | OUTPATIENT
Start: 2019-11-08 | End: 2020-02-05 | Stop reason: SDUPTHER

## 2020-02-05 ENCOUNTER — OFFICE VISIT (OUTPATIENT)
Dept: CARDIOLOGY CLINIC | Facility: CLINIC | Age: 80
End: 2020-02-05
Payer: MEDICARE

## 2020-02-05 VITALS
DIASTOLIC BLOOD PRESSURE: 60 MMHG | HEART RATE: 80 BPM | WEIGHT: 138 LBS | OXYGEN SATURATION: 98 % | BODY MASS INDEX: 27.82 KG/M2 | SYSTOLIC BLOOD PRESSURE: 130 MMHG | HEIGHT: 59 IN

## 2020-02-05 DIAGNOSIS — R94.39 ABNORMAL STRESS TEST: ICD-10-CM

## 2020-02-05 DIAGNOSIS — I25.10 CORONARY ARTERY DISEASE INVOLVING NATIVE CORONARY ARTERY OF NATIVE HEART WITHOUT ANGINA PECTORIS: Primary | ICD-10-CM

## 2020-02-05 DIAGNOSIS — E78.2 MIXED HYPERLIPIDEMIA: ICD-10-CM

## 2020-02-05 DIAGNOSIS — F41.9 ANXIETY: ICD-10-CM

## 2020-02-05 DIAGNOSIS — R06.00 DYSPNEA ON EFFORT: ICD-10-CM

## 2020-02-05 DIAGNOSIS — E78.5 HYPERLIPIDEMIA, UNSPECIFIED HYPERLIPIDEMIA TYPE: ICD-10-CM

## 2020-02-05 PROCEDURE — 99213 OFFICE O/P EST LOW 20 MIN: CPT | Performed by: INTERNAL MEDICINE

## 2020-02-05 RX ORDER — ATORVASTATIN CALCIUM 20 MG/1
20 TABLET, FILM COATED ORAL DAILY
Qty: 90 TABLET | Refills: 3 | Status: SHIPPED | OUTPATIENT
Start: 2020-02-05 | End: 2020-10-25

## 2020-02-05 NOTE — PROGRESS NOTES
[facility] [address] [facility] [id number]  Cardiology Follow Up    Abraham Harris  1940  [id number]      1  Coronary artery disease involving native coronary artery of native heart without angina pectoris     2  Anxiety     3  Mixed hyperlipidemia         Chief Complaint   Patient presents with    Follow-up    Coronary Artery Disease       Interval History:  Patient presents for follow-up visit  Patient denies any history of chest pain shortness of breath  Patient denies any history of leg edema or orthopnea PND  No history of presyncope syncope  Patient states compliance with the present list of medications      Patient Active Problem List   Diagnosis    Anxiety    Dyspnea on effort    Murmur    Encounter for gynecological examination without abnormal finding    Osteopenia    Hyperlipidemia    Coronary artery disease involving native coronary artery of native heart without angina pectoris     Past Medical History:   Diagnosis Date    Anxiety     Disease of thyroid gland     Fibroid     GERD (gastroesophageal reflux disease)     Heart murmur     Hypertension     Osteoporosis     Shortness of breath      Social History     Socioeconomic History    Marital status: /Civil Union     Spouse name: Not on file    Number of children: Not on file    Years of education: Not on file    Highest education level: Not on file   Occupational History    Not on file   Social Needs    Financial resource strain: Not on file    Food insecurity:     Worry: Not on file     Inability: Not on file    Transportation needs:     Medical: Not on file     Non-medical: Not on file   Tobacco Use    Smoking status: Never Smoker    Smokeless tobacco: Never Used   Substance and Sexual Activity    Alcohol use: No     Comment: rarely    Drug use: No    Sexual activity: Not Currently     Birth control/protection: Surgical     Comment: NAN-BSO    Lifestyle    Physical activity:     Days per week: Not on file     Minutes per session: Not on file    Stress: Not on file   Relationships    Social connections:     Talks on phone: Not on file     Gets together: Not on file     Attends [employer] service: Not on file     Active member of club or organization: Not on file     Attends meetings of clubs or organizations: Not on file     Relationship status: Not on file    Intimate partner violence:     Fear of current or ex partner: Not on file     Emotionally abused: Not on file     Physically abused: Not on file     Forced sexual activity: Not on file   Other Topics Concern    Not on file   Social History Narrative    Not on file      Family History   Problem Relation Age of Onset    Hypertension Father     Asthma Son     Arrhythmia Son      Past Surgical History:   Procedure Laterality Date    APPENDECTOMY      HEMORRHOID SURGERY      THYROID SURGERY      TOTAL ABDOMINAL HYSTERECTOMY      TUBAL LIGATION         Current Outpatient Medications:     ALPRAZolam (XANAX) 0 5 mg tablet, Take by mouth, Disp: , Rfl:     aspirin (ECOTRIN LOW STRENGTH) 81 mg EC tablet, Take 1 tablet (81 mg total) by mouth daily, Disp: 30 tablet, Rfl: 0    atorvastatin (LIPITOR) 20 mg tablet, Take 1 tablet (20 mg total) by mouth daily, Disp: 90 tablet, Rfl: 3    Calcium Carbonate-Vit D-Min (CALTRATE 600+D PLUS MINERALS) 600-800 MG-UNIT TABS, Take by mouth daily  , Disp: , Rfl:     Cholecalciferol (VITAMIN D3) 1000 units CAPS, Take by mouth daily  , Disp: , Rfl:     levothyroxine 100 mcg tablet, Take by mouth daily  , Disp: , Rfl:     MAGNESIUM PO, Take by mouth daily  , Disp: , Rfl:     metoprolol tartrate (LOPRESSOR) 25 mg tablet, 1/2 tab twice a day, Disp: 90 tablet, Rfl: 3    nizatidine (AXID) 150 MG capsule, 150 mg 2 (two) times a day, Disp: , Rfl: 5  No Known Allergies    Labs:  No visits with results within 2 Month(s) from this visit     Latest known visit with results is: Office Visit on 11/14/2018   Component Date Value    WBC 11/14/2018 5 47     RBC 11/14/2018 4 40     Hemoglobin 11/14/2018 13 2     Hematocrit 11/14/2018 42 5     MCV 11/14/2018 97     MCH 11/14/2018 30 0     MCHC 11/14/2018 31 1*    RDW 11/14/2018 12 6     MPV 11/14/2018 9 6     Platelets 37/99/4154 348     nRBC 11/14/2018 0     Neutrophils Relative 11/14/2018 46     Immat GRANS % 11/14/2018 0     Lymphocytes Relative 11/14/2018 38     Monocytes Relative 11/14/2018 11     Eosinophils Relative 11/14/2018 3     Basophils Relative 11/14/2018 2*    Neutrophils Absolute 11/14/2018 2 56     Immature Grans Absolute 11/14/2018 0 01     Lymphocytes Absolute 11/14/2018 2 06     Monocytes Absolute 11/14/2018 0 58     Eosinophils Absolute 11/14/2018 0 18     Basophils Absolute 11/14/2018 0 08     Sodium 11/14/2018 137     Potassium 11/14/2018 3 9     Chloride 11/14/2018 105     CO2 11/14/2018 28     ANION GAP 11/14/2018 4     BUN 11/14/2018 19     Creatinine 11/14/2018 0 96     Glucose 11/14/2018 74     Calcium 11/14/2018 9 2     eGFR 11/14/2018 56     Protime 11/14/2018 13 3     INR 11/14/2018 1 00      Imaging: No results found      Review of Systems:  Review of Systems   REVIEW OF SYSTEMS:  Constitutional:  Denies fever or chills   Eyes:  Denies change in visual acuity   HENT:  Denies nasal congestion or sore throat   Respiratory:  Denies cough or shortness of breath   Cardiovascular:  Denies chest pain or edema   GI:  Denies abdominal pain, nausea, vomiting, bloody stools or diarrhea   :  Denies dysuria, frequency, difficulty in micturition and nocturia  Musculoskeletal:  Denies back pain or joint pain   Neurologic:  Denies headache, focal weakness or sensory changes   Endocrine:  Denies polyuria or polydipsia   Lymphatic:  Denies swollen glands   Psychiatric:  Denies depression or anxiety     Physical Exam:    /60   Pulse 80   Ht 4' 11" (1 499 m)   Wt 62 6 kg (138 lb)   LMP (LMP Unknown)   SpO2 98%   BMI 27 87 kg/m²     Physical Exam   PHYSICAL EXAM:  General:  Patient is not in acute distress   Head: Normocephalic, Atraumatic  HEENT:  Both pupils normal-size atraumatic, normocephalic, nonicteric  Neck:  JVP not raised  Trachea central  No carotid bruit  Respiratory:  normal breath sounds no crackles  no rhonchi  Cardiovascular:  Regular rate and rhythm no S3 no murmurs  GI:  Abdomen soft nontender  No organomegaly  Lymphatic:  No cervical or inguinal lymphadenopathy  Neurologic:  Patient is awake alert, oriented   Grossly nonfocal  Extremities no edema    Discussion/Summary:  Patient overall doing well from a cardiovascular standpoint  Patient is doing well with medical therapy  Patient had 50% lad stenosis by cardiac catheterization  Symptoms to watch out from cardiac standpoint discussed with patient  Medications reviewed and refilled  Follow-up in 6 months or earlier as needed  Follow-up with primary care physician  Patient is agreeable with the plan of care

## 2020-05-08 DIAGNOSIS — K21.9 GASTROESOPHAGEAL REFLUX DISEASE WITHOUT ESOPHAGITIS: Primary | ICD-10-CM

## 2020-05-08 RX ORDER — NIZATIDINE 150 MG/1
CAPSULE ORAL
Qty: 180 CAPSULE | Refills: 1 | Status: SHIPPED | OUTPATIENT
Start: 2020-05-08 | End: 2020-05-11

## 2020-05-11 DIAGNOSIS — K21.9 GASTROESOPHAGEAL REFLUX DISEASE WITHOUT ESOPHAGITIS: ICD-10-CM

## 2020-05-11 DIAGNOSIS — K21.9 GASTROESOPHAGEAL REFLUX DISEASE WITHOUT ESOPHAGITIS: Primary | ICD-10-CM

## 2020-05-11 RX ORDER — NIZATIDINE 150 MG/1
CAPSULE ORAL
Qty: 180 CAPSULE | Refills: 1 | Status: SHIPPED | OUTPATIENT
Start: 2020-05-11 | End: 2020-05-18

## 2020-05-11 RX ORDER — FAMOTIDINE 40 MG/1
40 TABLET, FILM COATED ORAL DAILY
Qty: 30 TABLET | Refills: 5 | Status: SHIPPED | OUTPATIENT
Start: 2020-05-11 | End: 2020-10-05 | Stop reason: SDUPTHER

## 2020-05-13 DIAGNOSIS — F41.9 ANXIETY: Primary | ICD-10-CM

## 2020-05-13 RX ORDER — ALPRAZOLAM 0.5 MG/1
0.5 TABLET ORAL 2 TIMES DAILY PRN
Qty: 60 TABLET | Refills: 5 | Status: SHIPPED | OUTPATIENT
Start: 2020-05-13 | End: 2020-11-12 | Stop reason: SDUPTHER

## 2020-05-17 DIAGNOSIS — K21.9 GASTROESOPHAGEAL REFLUX DISEASE WITHOUT ESOPHAGITIS: ICD-10-CM

## 2020-05-18 RX ORDER — NIZATIDINE 150 MG/1
CAPSULE ORAL
Qty: 180 CAPSULE | Refills: 1 | Status: SHIPPED | OUTPATIENT
Start: 2020-05-18 | End: 2020-05-19

## 2020-05-19 DIAGNOSIS — K21.9 GASTROESOPHAGEAL REFLUX DISEASE WITHOUT ESOPHAGITIS: Primary | ICD-10-CM

## 2020-05-19 DIAGNOSIS — K21.9 GASTROESOPHAGEAL REFLUX DISEASE WITHOUT ESOPHAGITIS: ICD-10-CM

## 2020-05-19 RX ORDER — FAMOTIDINE 40 MG/1
40 TABLET, FILM COATED ORAL DAILY
Qty: 90 TABLET | Refills: 1 | Status: SHIPPED | OUTPATIENT
Start: 2020-05-19 | End: 2020-05-29 | Stop reason: SDUPTHER

## 2020-05-19 RX ORDER — NIZATIDINE 150 MG/1
CAPSULE ORAL
Qty: 180 CAPSULE | Refills: 1 | Status: SHIPPED | OUTPATIENT
Start: 2020-05-19 | End: 2021-04-27 | Stop reason: CLARIF

## 2020-06-01 ENCOUNTER — OFFICE VISIT (OUTPATIENT)
Dept: FAMILY MEDICINE CLINIC | Facility: CLINIC | Age: 80
End: 2020-06-01
Payer: MEDICARE

## 2020-06-01 VITALS
RESPIRATION RATE: 18 BRPM | OXYGEN SATURATION: 98 % | HEART RATE: 65 BPM | WEIGHT: 140.6 LBS | TEMPERATURE: 97.7 F | BODY MASS INDEX: 25.88 KG/M2 | SYSTOLIC BLOOD PRESSURE: 124 MMHG | DIASTOLIC BLOOD PRESSURE: 76 MMHG | HEIGHT: 62 IN

## 2020-06-01 DIAGNOSIS — Z23 NEED FOR SHINGLES VACCINE: Primary | ICD-10-CM

## 2020-06-01 PROCEDURE — 1036F TOBACCO NON-USER: CPT | Performed by: NURSE PRACTITIONER

## 2020-06-01 PROCEDURE — 99212 OFFICE O/P EST SF 10 MIN: CPT | Performed by: NURSE PRACTITIONER

## 2020-06-01 PROCEDURE — 1160F RVW MEDS BY RX/DR IN RCRD: CPT | Performed by: NURSE PRACTITIONER

## 2020-06-01 PROCEDURE — 3008F BODY MASS INDEX DOCD: CPT | Performed by: NURSE PRACTITIONER

## 2020-07-27 DIAGNOSIS — E03.9 HYPOTHYROIDISM, UNSPECIFIED TYPE: Primary | ICD-10-CM

## 2020-07-27 RX ORDER — LEVOTHYROXINE SODIUM 0.1 MG/1
TABLET ORAL
Qty: 90 TABLET | Refills: 1 | Status: SHIPPED | OUTPATIENT
Start: 2020-07-27 | End: 2021-01-19

## 2020-08-17 DIAGNOSIS — R94.39 ABNORMAL STRESS TEST: ICD-10-CM

## 2020-08-17 DIAGNOSIS — R06.00 DYSPNEA ON EFFORT: ICD-10-CM

## 2020-08-27 LAB
ALBUMIN SERPL-MCNC: 4.2 G/DL (ref 3.6–5.1)
ALBUMIN/GLOB SERPL: 1.5 (CALC) (ref 1–2.5)
ALP SERPL-CCNC: 97 U/L (ref 37–153)
ALT SERPL-CCNC: 22 U/L (ref 6–29)
APPEARANCE UR: CLEAR
AST SERPL-CCNC: 18 U/L (ref 10–35)
BACTERIA UR CULT: NORMAL
BACTERIA UR QL AUTO: NORMAL /HPF
BASOPHILS # BLD AUTO: 72 CELLS/UL (ref 0–200)
BASOPHILS NFR BLD AUTO: 1 %
BILIRUB SERPL-MCNC: 0.6 MG/DL (ref 0.2–1.2)
BILIRUB UR QL STRIP: NEGATIVE
BUN SERPL-MCNC: 17 MG/DL (ref 7–25)
BUN/CREAT SERPL: 17 (CALC) (ref 6–22)
CALCIUM SERPL-MCNC: 9.8 MG/DL (ref 8.6–10.4)
CHLORIDE SERPL-SCNC: 101 MMOL/L (ref 98–110)
CHOLEST SERPL-MCNC: 123 MG/DL
CHOLEST/HDLC SERPL: 2.1 (CALC)
CO2 SERPL-SCNC: 28 MMOL/L (ref 20–32)
COLOR UR: YELLOW
CREAT SERPL-MCNC: 1.02 MG/DL (ref 0.6–0.88)
EOSINOPHIL # BLD AUTO: 173 CELLS/UL (ref 15–500)
EOSINOPHIL NFR BLD AUTO: 2.4 %
ERYTHROCYTE [DISTWIDTH] IN BLOOD BY AUTOMATED COUNT: 11.7 % (ref 11–15)
GLOBULIN SER CALC-MCNC: 2.8 G/DL (CALC) (ref 1.9–3.7)
GLUCOSE SERPL-MCNC: 86 MG/DL (ref 65–99)
GLUCOSE UR QL STRIP: NEGATIVE
HCT VFR BLD AUTO: 43.3 % (ref 35–45)
HDLC SERPL-MCNC: 58 MG/DL
HGB BLD-MCNC: 14 G/DL (ref 11.7–15.5)
HGB UR QL STRIP: NEGATIVE
HYALINE CASTS #/AREA URNS LPF: NORMAL /LPF
KETONES UR QL STRIP: NEGATIVE
LDLC SERPL CALC-MCNC: 50 MG/DL (CALC)
LEUKOCYTE ESTERASE UR QL STRIP: NEGATIVE
LYMPHOCYTES # BLD AUTO: 1620 CELLS/UL (ref 850–3900)
LYMPHOCYTES NFR BLD AUTO: 22.5 %
MAGNESIUM SERPL-MCNC: 2.5 MG/DL (ref 1.5–2.5)
MCH RBC QN AUTO: 29.5 PG (ref 27–33)
MCHC RBC AUTO-ENTMCNC: 32.3 G/DL (ref 32–36)
MCV RBC AUTO: 91.4 FL (ref 80–100)
MONOCYTES # BLD AUTO: 504 CELLS/UL (ref 200–950)
MONOCYTES NFR BLD AUTO: 7 %
NEUTROPHILS # BLD AUTO: 4831 CELLS/UL (ref 1500–7800)
NEUTROPHILS NFR BLD AUTO: 67.1 %
NITRITE UR QL STRIP: NEGATIVE
NONHDLC SERPL-MCNC: 65 MG/DL (CALC)
PH UR STRIP: 6 [PH] (ref 5–8)
PLATELET # BLD AUTO: 367 THOUSAND/UL (ref 140–400)
PMV BLD REES-ECKER: 10.6 FL (ref 7.5–12.5)
POTASSIUM SERPL-SCNC: 4.7 MMOL/L (ref 3.5–5.3)
PROT SERPL-MCNC: 7 G/DL (ref 6.1–8.1)
PROT UR QL STRIP: NEGATIVE
RBC # BLD AUTO: 4.74 MILLION/UL (ref 3.8–5.1)
RBC #/AREA URNS HPF: NORMAL /HPF
SL AMB EGFR AFRICAN AMERICAN: 60 ML/MIN/1.73M2
SL AMB EGFR NON AFRICAN AMERICAN: 52 ML/MIN/1.73M2
SODIUM SERPL-SCNC: 136 MMOL/L (ref 135–146)
SP GR UR STRIP: 1.01 (ref 1–1.03)
SQUAMOUS #/AREA URNS HPF: NORMAL /HPF
T4 SERPL-MCNC: 8.9 MCG/DL (ref 4.8–10.4)
TRIGL SERPL-MCNC: 71 MG/DL
TSH SERPL-ACNC: 0.09 MIU/L (ref 0.4–4.5)
URATE SERPL-MCNC: 3.6 MG/DL (ref 2.5–7)
WBC # BLD AUTO: 7.2 THOUSAND/UL (ref 3.8–10.8)
WBC #/AREA URNS HPF: NORMAL /HPF

## 2020-08-28 ENCOUNTER — OFFICE VISIT (OUTPATIENT)
Dept: FAMILY MEDICINE CLINIC | Facility: CLINIC | Age: 80
End: 2020-08-28
Payer: MEDICARE

## 2020-08-28 VITALS
WEIGHT: 132 LBS | TEMPERATURE: 98.2 F | HEART RATE: 63 BPM | BODY MASS INDEX: 24.29 KG/M2 | DIASTOLIC BLOOD PRESSURE: 70 MMHG | RESPIRATION RATE: 16 BRPM | SYSTOLIC BLOOD PRESSURE: 118 MMHG | HEIGHT: 62 IN | OXYGEN SATURATION: 98 %

## 2020-08-28 DIAGNOSIS — E78.2 MIXED HYPERLIPIDEMIA: ICD-10-CM

## 2020-08-28 DIAGNOSIS — E03.9 HYPOTHYROIDISM, UNSPECIFIED TYPE: Primary | ICD-10-CM

## 2020-08-28 PROCEDURE — 99214 OFFICE O/P EST MOD 30 MIN: CPT | Performed by: FAMILY MEDICINE

## 2020-08-28 PROCEDURE — 1160F RVW MEDS BY RX/DR IN RCRD: CPT | Performed by: FAMILY MEDICINE

## 2020-08-28 PROCEDURE — 1036F TOBACCO NON-USER: CPT | Performed by: FAMILY MEDICINE

## 2020-08-28 PROCEDURE — 3008F BODY MASS INDEX DOCD: CPT | Performed by: FAMILY MEDICINE

## 2020-08-28 NOTE — PROGRESS NOTES
Assessment/Plan:      Diagnoses and all orders for this visit:    Hypothyroidism, unspecified type  -     TSH + Free T4; Future    Mixed hyperlipidemia  -     Comprehensive metabolic panel; Future  -     Lipid Panel with Direct LDL reflex; Future          Subjective:     Patient ID: Valentino Arabian is a [de-identified] y o  female  Pt here for cehckup on hypothyroidism, CAD, lipds GERD and is doing well  Pt  Will need refills  Today  Review of Systems   Constitutional: Negative for activity change, appetite change, fatigue and fever  HENT: Negative for congestion, ear pain, postnasal drip, rhinorrhea, sinus pressure, sinus pain, sneezing and sore throat  Eyes: Negative for pain and redness  Respiratory: Negative for apnea, cough, chest tightness, shortness of breath and wheezing  Cardiovascular: Negative for chest pain, palpitations and leg swelling  Gastrointestinal: Negative for abdominal pain, constipation, diarrhea, nausea and vomiting  Endocrine: Negative for cold intolerance and heat intolerance  Genitourinary: Negative for difficulty urinating, dysuria, frequency, hematuria and urgency  Musculoskeletal: Negative for arthralgias, back pain, gait problem and myalgias  Skin: Negative for rash  Neurological: Negative for dizziness, seizures, speech difficulty, weakness, numbness and headaches  Hematological: Does not bruise/bleed easily  Psychiatric/Behavioral: Negative for agitation, confusion and hallucinations  Objective:     Physical Exam  Vitals signs and nursing note reviewed  Constitutional:       Appearance: She is well-developed  HENT:      Head: Normocephalic and atraumatic  Nose: Nose normal    Eyes:      General: No scleral icterus  Conjunctiva/sclera: Conjunctivae normal       Pupils: Pupils are equal, round, and reactive to light  Neck:      Musculoskeletal: Normal range of motion and neck supple  Thyroid: No thyromegaly     Cardiovascular: Rate and Rhythm: Normal rate and regular rhythm  Heart sounds: Gallop present  Pulmonary:      Effort: Pulmonary effort is normal       Breath sounds: Normal breath sounds  No wheezing  Abdominal:      General: Bowel sounds are normal  There is no distension  Palpations: Abdomen is soft  Tenderness: There is no abdominal tenderness  There is no guarding or rebound  Musculoskeletal: Normal range of motion  General: No tenderness or deformity  Skin:     General: Skin is warm and dry  Findings: No erythema or rash  Neurological:      Mental Status: She is alert and oriented to person, place, and time  Sensory: No sensory deficit  Psychiatric:         Behavior: Behavior normal          Thought Content:  Thought content normal          Judgment: Judgment normal

## 2020-10-05 DIAGNOSIS — K21.9 GASTROESOPHAGEAL REFLUX DISEASE WITHOUT ESOPHAGITIS: ICD-10-CM

## 2020-10-05 RX ORDER — FAMOTIDINE 40 MG/1
40 TABLET, FILM COATED ORAL DAILY
Qty: 90 TABLET | Refills: 1 | Status: SHIPPED | OUTPATIENT
Start: 2020-10-05 | End: 2020-12-15

## 2020-10-24 DIAGNOSIS — E78.5 HYPERLIPIDEMIA, UNSPECIFIED HYPERLIPIDEMIA TYPE: ICD-10-CM

## 2020-10-25 RX ORDER — ATORVASTATIN CALCIUM 20 MG/1
TABLET, FILM COATED ORAL
Qty: 90 TABLET | Refills: 3 | Status: SHIPPED | OUTPATIENT
Start: 2020-10-25 | End: 2020-12-15 | Stop reason: SDUPTHER

## 2020-11-12 DIAGNOSIS — F41.9 ANXIETY: ICD-10-CM

## 2020-11-12 RX ORDER — ALPRAZOLAM 0.5 MG/1
0.5 TABLET ORAL 2 TIMES DAILY PRN
Qty: 60 TABLET | Refills: 5 | Status: SHIPPED | OUTPATIENT
Start: 2020-11-12 | End: 2021-04-27 | Stop reason: SDUPTHER

## 2020-12-15 ENCOUNTER — OFFICE VISIT (OUTPATIENT)
Dept: CARDIOLOGY CLINIC | Facility: CLINIC | Age: 80
End: 2020-12-15
Payer: MEDICARE

## 2020-12-15 VITALS
DIASTOLIC BLOOD PRESSURE: 72 MMHG | SYSTOLIC BLOOD PRESSURE: 124 MMHG | HEART RATE: 83 BPM | HEIGHT: 62 IN | OXYGEN SATURATION: 99 % | WEIGHT: 132 LBS | BODY MASS INDEX: 24.29 KG/M2

## 2020-12-15 DIAGNOSIS — E78.5 HYPERLIPIDEMIA, UNSPECIFIED HYPERLIPIDEMIA TYPE: ICD-10-CM

## 2020-12-15 DIAGNOSIS — R94.39 ABNORMAL STRESS TEST: ICD-10-CM

## 2020-12-15 DIAGNOSIS — I25.10 CORONARY ARTERY DISEASE INVOLVING NATIVE CORONARY ARTERY OF NATIVE HEART WITHOUT ANGINA PECTORIS: Primary | ICD-10-CM

## 2020-12-15 DIAGNOSIS — R06.00 DYSPNEA ON EFFORT: ICD-10-CM

## 2020-12-15 PROCEDURE — 99213 OFFICE O/P EST LOW 20 MIN: CPT | Performed by: INTERNAL MEDICINE

## 2020-12-15 RX ORDER — ATORVASTATIN CALCIUM 20 MG/1
20 TABLET, FILM COATED ORAL DAILY
Qty: 90 TABLET | Refills: 3 | Status: SHIPPED | OUTPATIENT
Start: 2020-12-15 | End: 2021-10-13

## 2021-01-14 DIAGNOSIS — K21.9 GASTROESOPHAGEAL REFLUX DISEASE WITHOUT ESOPHAGITIS: Primary | ICD-10-CM

## 2021-01-14 RX ORDER — FAMOTIDINE 20 MG/1
40 TABLET, FILM COATED ORAL ONCE
Qty: 90 TABLET | Refills: 0 | Status: SHIPPED | OUTPATIENT
Start: 2021-01-14 | End: 2021-01-25

## 2021-01-19 DIAGNOSIS — E03.9 HYPOTHYROIDISM, UNSPECIFIED TYPE: ICD-10-CM

## 2021-01-19 RX ORDER — LEVOTHYROXINE SODIUM 0.1 MG/1
TABLET ORAL
Qty: 90 TABLET | Refills: 1 | Status: SHIPPED | OUTPATIENT
Start: 2021-01-19 | End: 2021-04-27 | Stop reason: SDUPTHER

## 2021-01-25 DIAGNOSIS — K21.9 GASTROESOPHAGEAL REFLUX DISEASE WITHOUT ESOPHAGITIS: Primary | ICD-10-CM

## 2021-01-25 RX ORDER — FAMOTIDINE 40 MG/1
40 TABLET, FILM COATED ORAL DAILY
Qty: 90 TABLET | Refills: 1 | Status: SHIPPED | OUTPATIENT
Start: 2021-01-25 | End: 2021-04-27 | Stop reason: CLARIF

## 2021-02-23 DIAGNOSIS — K21.9 GASTROESOPHAGEAL REFLUX DISEASE WITHOUT ESOPHAGITIS: ICD-10-CM

## 2021-02-23 RX ORDER — FAMOTIDINE 20 MG/1
TABLET, FILM COATED ORAL
Qty: 90 TABLET | Refills: 0 | OUTPATIENT
Start: 2021-02-23

## 2021-02-24 ENCOUNTER — TELEMEDICINE (OUTPATIENT)
Dept: FAMILY MEDICINE CLINIC | Facility: CLINIC | Age: 81
End: 2021-02-24
Payer: MEDICARE

## 2021-02-24 ENCOUNTER — LAB (OUTPATIENT)
Dept: LAB | Facility: MEDICAL CENTER | Age: 81
End: 2021-02-24
Payer: MEDICARE

## 2021-02-24 ENCOUNTER — TRANSCRIBE ORDERS (OUTPATIENT)
Dept: ADMINISTRATIVE | Facility: HOSPITAL | Age: 81
End: 2021-02-24

## 2021-02-24 DIAGNOSIS — E03.9 HYPOTHYROIDISM, UNSPECIFIED TYPE: Primary | ICD-10-CM

## 2021-02-24 DIAGNOSIS — J02.9 SORE THROAT: Primary | ICD-10-CM

## 2021-02-24 DIAGNOSIS — E78.2 MIXED HYPERLIPIDEMIA: ICD-10-CM

## 2021-02-24 LAB
ALBUMIN SERPL BCP-MCNC: 3.9 G/DL (ref 3.5–5)
ALP SERPL-CCNC: 107 U/L (ref 46–116)
ALT SERPL W P-5'-P-CCNC: 54 U/L (ref 12–78)
ANION GAP SERPL CALCULATED.3IONS-SCNC: 4 MMOL/L (ref 4–13)
AST SERPL W P-5'-P-CCNC: 30 U/L (ref 5–45)
BILIRUB SERPL-MCNC: 0.49 MG/DL (ref 0.2–1)
BUN SERPL-MCNC: 16 MG/DL (ref 5–25)
CALCIUM SERPL-MCNC: 10.3 MG/DL (ref 8.3–10.1)
CHLORIDE SERPL-SCNC: 110 MMOL/L (ref 100–108)
CHOLEST SERPL-MCNC: 121 MG/DL (ref 50–200)
CO2 SERPL-SCNC: 28 MMOL/L (ref 21–32)
CREAT SERPL-MCNC: 1.01 MG/DL (ref 0.6–1.3)
GFR SERPL CREATININE-BSD FRML MDRD: 52 ML/MIN/1.73SQ M
GLUCOSE P FAST SERPL-MCNC: 90 MG/DL (ref 65–99)
HDLC SERPL-MCNC: 63 MG/DL
LDLC SERPL CALC-MCNC: 47 MG/DL (ref 0–100)
POTASSIUM SERPL-SCNC: 4.8 MMOL/L (ref 3.5–5.3)
PROT SERPL-MCNC: 7.8 G/DL (ref 6.4–8.2)
SODIUM SERPL-SCNC: 142 MMOL/L (ref 136–145)
T4 FREE SERPL-MCNC: 1.56 NG/DL (ref 0.76–1.46)
TRIGL SERPL-MCNC: 56 MG/DL
TSH SERPL DL<=0.05 MIU/L-ACNC: 0.05 UIU/ML (ref 0.36–3.74)

## 2021-02-24 PROCEDURE — U0005 INFEC AGEN DETEC AMPLI PROBE: HCPCS | Performed by: NURSE PRACTITIONER

## 2021-02-24 PROCEDURE — 80061 LIPID PANEL: CPT

## 2021-02-24 PROCEDURE — 80053 COMPREHEN METABOLIC PANEL: CPT

## 2021-02-24 PROCEDURE — 36415 COLL VENOUS BLD VENIPUNCTURE: CPT

## 2021-02-24 PROCEDURE — U0003 INFECTIOUS AGENT DETECTION BY NUCLEIC ACID (DNA OR RNA); SEVERE ACUTE RESPIRATORY SYNDROME CORONAVIRUS 2 (SARS-COV-2) (CORONAVIRUS DISEASE [COVID-19]), AMPLIFIED PROBE TECHNIQUE, MAKING USE OF HIGH THROUGHPUT TECHNOLOGIES AS DESCRIBED BY CMS-2020-01-R: HCPCS | Performed by: NURSE PRACTITIONER

## 2021-02-24 PROCEDURE — 84443 ASSAY THYROID STIM HORMONE: CPT | Performed by: FAMILY MEDICINE

## 2021-02-24 PROCEDURE — 84439 ASSAY OF FREE THYROXINE: CPT | Performed by: FAMILY MEDICINE

## 2021-02-24 PROCEDURE — 99441 PR PHYS/QHP TELEPHONE EVALUATION 5-10 MIN: CPT | Performed by: NURSE PRACTITIONER

## 2021-02-25 LAB — SARS-COV-2 RNA RESP QL NAA+PROBE: NEGATIVE

## 2021-02-27 DIAGNOSIS — K21.9 GASTROESOPHAGEAL REFLUX DISEASE WITHOUT ESOPHAGITIS: ICD-10-CM

## 2021-02-27 RX ORDER — FAMOTIDINE 20 MG/1
TABLET, FILM COATED ORAL
Qty: 90 TABLET | Refills: 0 | Status: SHIPPED | OUTPATIENT
Start: 2021-02-27 | End: 2021-04-08

## 2021-03-02 ENCOUNTER — TELEPHONE (OUTPATIENT)
Dept: ADMINISTRATIVE | Facility: OTHER | Age: 81
End: 2021-03-02

## 2021-03-02 NOTE — TELEPHONE ENCOUNTER
----- Message from Faye Wyatt sent at 3/1/2021  2:50 PM EST -----  Regarding: care gap request (pneumonia vaccines)  03/01/21 2:50 PM    Hello, our patient attached above has had Immunization(Prevnar 13) completed/performed  Please assist in updating the patient chart by pulling a previous Electronic Medical Record (EMR) document  The previous EMR is [place]  The date of service is 10/06/17  Thank you,  Faye FERNANDEZ       03/01/21 2:52 PM    Hello, our patient Kasandra Dixon has had Immunization(Pneumovax 23) completed/performed  Please assist in updating the patient chart by pulling a previous Electronic Medical Record (EMR) document  The previous EMR is [place]  The date of service is 1/22/20      Thank you,  Faye FERNANDEZ

## 2021-03-03 NOTE — TELEPHONE ENCOUNTER
Upon review of the In Basket request we were able to locate, review, and update the patient chart as requested for Immunization(s) 2 Pneumococcal vaccines, 3 Influenza vaccines  Any additional questions or concerns should be emailed to the Practice Liaisons via [email or web address] email, please do not reply via In Basket      Thank you  Nelson Shankar MA

## 2021-03-03 NOTE — TELEPHONE ENCOUNTER
Immunization from [address] not reconciled in chart Reconcile Immunization Activities/ reconciled immunizations and updated;   Pneumococcal Conjugate 13-Valent 10/6/2017, 1/18/2017     INFLUENZA 10/9/2018, 10/31/2016, 12/5/2014

## 2021-04-08 DIAGNOSIS — K21.9 GASTROESOPHAGEAL REFLUX DISEASE WITHOUT ESOPHAGITIS: ICD-10-CM

## 2021-04-08 RX ORDER — FAMOTIDINE 20 MG/1
TABLET, FILM COATED ORAL
Qty: 90 TABLET | Refills: 0 | Status: SHIPPED | OUTPATIENT
Start: 2021-04-08 | End: 2021-04-27 | Stop reason: SDUPTHER

## 2021-04-27 ENCOUNTER — OFFICE VISIT (OUTPATIENT)
Dept: FAMILY MEDICINE CLINIC | Facility: CLINIC | Age: 81
End: 2021-04-27
Payer: MEDICARE

## 2021-04-27 VITALS
RESPIRATION RATE: 16 BRPM | TEMPERATURE: 98 F | DIASTOLIC BLOOD PRESSURE: 64 MMHG | HEIGHT: 62 IN | WEIGHT: 121 LBS | OXYGEN SATURATION: 99 % | HEART RATE: 68 BPM | SYSTOLIC BLOOD PRESSURE: 120 MMHG | BODY MASS INDEX: 22.26 KG/M2

## 2021-04-27 DIAGNOSIS — F41.9 ANXIETY: ICD-10-CM

## 2021-04-27 DIAGNOSIS — E03.9 HYPOTHYROIDISM, UNSPECIFIED TYPE: ICD-10-CM

## 2021-04-27 DIAGNOSIS — Z23 ENCOUNTER FOR IMMUNIZATION: Primary | ICD-10-CM

## 2021-04-27 DIAGNOSIS — E03.9 ACQUIRED HYPOTHYROIDISM: ICD-10-CM

## 2021-04-27 DIAGNOSIS — Z00.00 MEDICARE ANNUAL WELLNESS VISIT, SUBSEQUENT: ICD-10-CM

## 2021-04-27 DIAGNOSIS — I10 HYPERTENSION, UNSPECIFIED TYPE: ICD-10-CM

## 2021-04-27 DIAGNOSIS — E78.2 MIXED HYPERLIPIDEMIA: ICD-10-CM

## 2021-04-27 DIAGNOSIS — K21.9 GASTROESOPHAGEAL REFLUX DISEASE WITHOUT ESOPHAGITIS: ICD-10-CM

## 2021-04-27 PROCEDURE — 1123F ACP DISCUSS/DSCN MKR DOCD: CPT | Performed by: NURSE PRACTITIONER

## 2021-04-27 PROCEDURE — G0439 PPPS, SUBSEQ VISIT: HCPCS | Performed by: NURSE PRACTITIONER

## 2021-04-27 PROCEDURE — 99214 OFFICE O/P EST MOD 30 MIN: CPT | Performed by: NURSE PRACTITIONER

## 2021-04-27 RX ORDER — FAMOTIDINE 20 MG/1
40 TABLET, FILM COATED ORAL DAILY
Qty: 90 TABLET | Refills: 0 | Status: SHIPPED | OUTPATIENT
Start: 2021-04-27 | End: 2021-06-23

## 2021-04-27 RX ORDER — ALPRAZOLAM 0.5 MG/1
0.5 TABLET ORAL 2 TIMES DAILY PRN
Qty: 60 TABLET | Refills: 5 | Status: SHIPPED | OUTPATIENT
Start: 2021-04-27 | End: 2021-11-16

## 2021-04-27 RX ORDER — LEVOTHYROXINE SODIUM 0.1 MG/1
100 TABLET ORAL DAILY
Qty: 90 TABLET | Refills: 1 | Status: SHIPPED | OUTPATIENT
Start: 2021-04-27 | End: 2021-07-27 | Stop reason: SDUPTHER

## 2021-04-27 NOTE — PROGRESS NOTES
Assessment/Plan:  Physical assessment was unremarkable patients  Heart murmur anxiety and hyperlipidemia and coronary artery disease all remains stable  Any recent labs reviewed and found to be within normal limits  Additionally patient's medication she requested to be refilled were sent to the pharmacy  He should be advised patient requested a printout of the on hard copy scripts but were sent to the pharmacy patient will check to see what is available and not and will reproduce scripts for her at this time  Dosing all possible side effects of the prescribed medications or medications that had been prescribed in the past were reviewed and all questions were answered  Patient verbalized agreement and understanding of the plan of care as outlined during the office visit today return to office as indicated or sooner if a problem arises  Patient was also scheduled for COVID 19 vaccine  All questions were answered patient was happy with the end of the visit return to office as indicated           Problem List Items Addressed This Visit        Other    Anxiety    Relevant Medications    ALPRAZolam (Xanax) 0 5 mg tablet    Hyperlipidemia    Relevant Orders    CBC and differential    Comprehensive metabolic panel    Lipid panel    TSH, 3rd generation    UA w Reflex to Microscopic w Reflex to Culture      Other Visit Diagnoses     Encounter for immunization    -  Primary    Gastroesophageal reflux disease without esophagitis        Relevant Medications    famotidine (PEPCID) 20 mg tablet    Hypothyroidism, unspecified type        Relevant Medications    levothyroxine 100 mcg tablet    Other Relevant Orders    CBC and differential    Comprehensive metabolic panel    Lipid panel    TSH, 3rd generation    UA w Reflex to Microscopic w Reflex to Culture    Acquired hypothyroidism        Relevant Medications    levothyroxine 100 mcg tablet    Other Relevant Orders    CBC and differential    Comprehensive metabolic panel Lipid panel    TSH, 3rd generation    UA w Reflex to Microscopic w Reflex to Culture    Hypertension, unspecified type        Relevant Orders    CBC and differential    Comprehensive metabolic panel    Lipid panel    TSH, 3rd generation    UA w Reflex to Microscopic w Reflex to Culture            Subjective:      Patient ID: Khai Tapia is a 79 y o  female  Patient is here for routine follow-up  To review most recent labs  To also discuss current state of health and any new problems that they may be experiencing  Patient states that medications taken as prescribed and very well tolerated no new complaints at this time  The following portions of the patient's history were reviewed and updated as appropriate: allergies, current medications, past family history, past medical history, past social history, past surgical history and problem list     Review of Systems   Constitutional: Positive for unexpected weight change  Negative for appetite change and fever  HENT: Negative for sinus pressure and sore throat  Eyes: Negative for pain  Respiratory: Negative for shortness of breath  Cardiovascular: Negative for chest pain  Gastrointestinal: Negative for abdominal pain  Genitourinary: Negative for dysuria  Musculoskeletal: Negative for arthralgias and myalgias  Skin: Negative for color change  Neurological: Negative for light-headedness  Psychiatric/Behavioral: Negative for behavioral problems  The patient is nervous/anxious  Objective:      /64 (BP Location: Left arm, Patient Position: Sitting, Cuff Size: Standard)   Pulse 68   Temp 98 °F (36 7 °C) (Temporal)   Resp 16   Ht 5' 2" (1 575 m)   Wt 54 9 kg (121 lb)   LMP  (LMP Unknown)   SpO2 99%   BMI 22 13 kg/m²          Physical Exam  Vitals signs and nursing note reviewed  Constitutional:       General: She is not in acute distress  Appearance: Normal appearance  She is well-developed   She is not diaphoretic  HENT:      Head: Normocephalic and atraumatic  Right Ear: External ear normal       Left Ear: External ear normal       Mouth/Throat:      Mouth: Mucous membranes are moist    Eyes:      Pupils: Pupils are equal, round, and reactive to light  Neck:      Musculoskeletal: Normal range of motion  Cardiovascular:      Rate and Rhythm: Normal rate and regular rhythm  Pulses: Normal pulses  Heart sounds: Normal heart sounds  Pulmonary:      Effort: Pulmonary effort is normal       Breath sounds: Normal breath sounds  Abdominal:      Palpations: Abdomen is soft  Musculoskeletal: Normal range of motion  Skin:     General: Skin is dry  Neurological:      Mental Status: She is alert and oriented to person, place, and time  Mental status is at baseline  Psychiatric:         Behavior: Behavior normal          Thought Content:  Thought content normal

## 2021-04-27 NOTE — PATIENT INSTRUCTIONS
Medicare Preventive Visit Patient Instructions  Thank you for completing your Welcome to Medicare Visit or Medicare Annual Wellness Visit today  Your next wellness visit will be due in one year (4/28/2022)  The screening/preventive services that you may require over the next 5-10 years are detailed below  Some tests may not apply to you based off risk factors and/or age  Screening tests ordered at today's visit but not completed yet may show as past due  Also, please note that scanned in results may not display below  Preventive Screenings:  Service Recommendations Previous Testing/Comments   Colorectal Cancer Screening  * Colonoscopy    * Fecal Occult Blood Test (FOBT)/Fecal Immunochemical Test (FIT)  * Fecal DNA/Cologuard Test  * Flexible Sigmoidoscopy Age: 54-65 years old   Colonoscopy: every 10 years (may be performed more frequently if at higher risk)  OR  FOBT/FIT: every 1 year  OR  Cologuard: every 3 years  OR  Sigmoidoscopy: every 5 years  Screening may be recommended earlier than age 48 if at higher risk for colorectal cancer  Also, an individualized decision between you and your healthcare provider will decide whether screening between the ages of 74-80 would be appropriate  Colonoscopy: Not on file  FOBT/FIT: Not on file  Cologuard: Not on file  Sigmoidoscopy: Not on file          Breast Cancer Screening Age: 36 years old  Frequency: every 1-2 years  Not required if history of left and right mastectomy Mammogram: 11/19/2020    Screening Current   Cervical Cancer Screening Between the ages of 21-29, pap smear recommended once every 3 years  Between the ages of 33-67, can perform pap smear with HPV co-testing every 5 years     Recommendations may differ for women with a history of total hysterectomy, cervical cancer, or abnormal pap smears in past  Pap Smear: 12/10/2018    Screening Not Indicated   Hepatitis C Screening Once for adults born between 1945 and 1965  More frequently in patients at high risk for Hepatitis C Hep C Antibody: Not on file        Diabetes Screening 1-2 times per year if you're at risk for diabetes or have pre-diabetes Fasting glucose: 90 mg/dL   A1C: No results in last 5 years    Screening Current   Cholesterol Screening Once every 5 years if you don't have a lipid disorder  May order more often based on risk factors  Lipid panel: 02/24/2021    Screening Not Indicated  History Lipid Disorder     Other Preventive Screenings Covered by Medicare:  1  Abdominal Aortic Aneurysm (AAA) Screening: covered once if your at risk  You're considered to be at risk if you have a family history of AAA  2  Lung Cancer Screening: covers low dose CT scan once per year if you meet all of the following conditions: (1) Age 50-69; (2) No signs or symptoms of lung cancer; (3) Current smoker or have quit smoking within the last 15 years; (4) You have a tobacco smoking history of at least 30 pack years (packs per day multiplied by number of years you smoked); (5) You get a written order from a healthcare provider  3  Glaucoma Screening: covered annually if you're considered high risk: (1) You have diabetes OR (2) Family history of glaucoma OR (3)  aged 48 and older OR (3)  American aged 72 and older  3  Osteoporosis Screening: covered every 2 years if you meet one of the following conditions: (1) You're estrogen deficient and at risk for osteoporosis based off medical history and other findings; (2) Have a vertebral abnormality; (3) On glucocorticoid therapy for more than 3 months; (4) Have primary hyperparathyroidism; (5) On osteoporosis medications and need to assess response to drug therapy  · Last bone density test (DXA Scan): Not on file  5  HIV Screening: covered annually if you're between the age of 12-76  Also covered annually if you are younger than 13 and older than 72 with risk factors for HIV infection   For pregnant patients, it is covered up to 3 times per pregnancy  Immunizations:  Immunization Recommendations   Influenza Vaccine Annual influenza vaccination during flu season is recommended for all persons aged >= 6 months who do not have contraindications   Pneumococcal Vaccine (Prevnar and Pneumovax)  * Prevnar = PCV13  * Pneumovax = PPSV23   Adults 25-60 years old: 1-3 doses may be recommended based on certain risk factors  Adults 72 years old: Prevnar (PCV13) vaccine recommended followed by Pneumovax (PPSV23) vaccine  If already received PPSV23 since turning 65, then PCV13 recommended at least one year after PPSV23 dose  Hepatitis B Vaccine 3 dose series if at intermediate or high risk (ex: diabetes, end stage renal disease, liver disease)   Tetanus (Td) Vaccine - COST NOT COVERED BY MEDICARE PART B Following completion of primary series, a booster dose should be given every 10 years to maintain immunity against tetanus  Td may also be given as tetanus wound prophylaxis  Tdap Vaccine - COST NOT COVERED BY MEDICARE PART B Recommended at least once for all adults  For pregnant patients, recommended with each pregnancy  Shingles Vaccine (Shingrix) - COST NOT COVERED BY MEDICARE PART B  2 shot series recommended in those aged 48 and above     Health Maintenance Due:  There are no preventive care reminders to display for this patient  Immunizations Due:      Topic Date Due    COVID-19 Vaccine (1) Never done    DTaP,Tdap,and Td Vaccines (1 - Tdap) Never done    Influenza Vaccine (1) 09/01/2020     Advance Directives   What are advance directives? Advance directives are legal documents that state your wishes and plans for medical care  These plans are made ahead of time in case you lose your ability to make decisions for yourself  Advance directives can apply to any medical decision, such as the treatments you want, and if you want to donate organs  What are the types of advance directives?   There are many types of advance directives, and each state has rules about how to use them  You may choose a combination of any of the following:  · Living will: This is a written record of the treatment you want  You can also choose which treatments you do not want, which to limit, and which to stop at a certain time  This includes surgery, medicine, IV fluid, and tube feedings  · Durable power of  for healthcare [facility]): This is a written record that states who you want to make healthcare choices for you when you are unable to make them for yourself  This person, called a proxy, is usually a family member or a friend  You may choose more than 1 proxy  · Do not resuscitate (DNR) order:  A DNR order is used in case your heart stops beating or you stop breathing  It is a request not to have certain forms of treatment, such as CPR  A DNR order may be included in other types of advance directives  · Medical directive: This covers the care that you want if you are in a coma, near death, or unable to make decisions for yourself  You can list the treatments you want for each condition  Treatment may include pain medicine, surgery, blood transfusions, dialysis, IV or tube feedings, and a ventilator (breathing machine)  · Values history: This document has questions about your views, beliefs, and how you feel and think about life  This information can help others choose the care that you would choose  Why are advance directives important? An advance directive helps you control your care  Although spoken wishes may be used, it is better to have your wishes written down  Spoken wishes can be misunderstood, or not followed  Treatments may be given even if you do not want them  An advance directive may make it easier for your family to make difficult choices about your care  © Copyright [address] 2018 Information is for End User's use only and may not be sold, redistributed or otherwise used for commercial purposes   All illustrations and images included in CareNotes® are the copyrighted property of A D A M [employer]  or [address] [place], [place] [address]cami bradley

## 2021-04-27 NOTE — PROGRESS NOTES
Assessment and Plan:     Problem List Items Addressed This Visit     None      Visit Diagnoses     Encounter for immunization    -  Primary           Preventive health issues were discussed with patient, and age appropriate screening tests were ordered as noted in patient's After Visit Summary  Personalized health advice and appropriate referrals for health education or preventive services given if needed, as noted in patient's After Visit Summary       History of Present Illness:     Patient presents for Medicare Annual Wellness visit    Patient Care Team:  Maryam Lacey MD as PCP - General (Family Medicine)  Sangita Gonzalez MD     Problem List:     Patient Active Problem List   Diagnosis    Anxiety    Dyspnea on effort    Murmur    Encounter for gynecological examination without abnormal finding    Osteopenia    Hyperlipidemia    Coronary artery disease involving native coronary artery of native heart without angina pectoris      Past Medical and Surgical History:     Past Medical History:   Diagnosis Date    Anxiety     Back pain     Disease of thyroid gland     Diverticulitis     Diverticulosis     Fibroid     GERD (gastroesophageal reflux disease)     Heart murmur     History of mammogram 11/01/2017    Hypertension     Hypothyroidism     Osteoporosis     Shortness of breath      Past Surgical History:   Procedure Laterality Date    APPENDECTOMY      COLONOSCOPY  2013    EYE SURGERY Right 07/05/2017    catarect surgery     EYE SURGERY Left 05/24/2017    catarect surgery     HEMORRHOID SURGERY      MAMMO (HISTORICAL)  11/01/2017    THYROID SURGERY      TOTAL ABDOMINAL HYSTERECTOMY      TUBAL LIGATION        Family History:     Family History   Problem Relation Age of Onset    Hypertension Father     Asthma Son     Arrhythmia Son       Social History:     E-Cigarette/Vaping    E-Cigarette Use Never User      E-Cigarette/Vaping Substances    Nicotine No     THC No     CBD No  Flavoring No     Other No     Unknown No      Social History     Socioeconomic History    Marital status: /Civil Union     Spouse name: None    Number of children: None    Years of education: 15    Highest education level: None   Occupational History    None   Social Needs    Financial resource strain: None    Food insecurity     Worry: None     Inability: None    Transportation needs     Medical: None     Non-medical: None   Tobacco Use    Smoking status: Former Smoker     Years: 5 00     Types: Cigarettes    Smokeless tobacco: Never Used    Tobacco comment: as per Roseann Miguel   Substance and Sexual Activity    Alcohol use: No     Comment: rarely    Drug use: No    Sexual activity: Not Currently     Birth control/protection: Surgical     Comment: NAN-BSO    Lifestyle    Physical activity     Days per week: None     Minutes per session: None    Stress: None   Relationships    Social connections     Talks on phone: None     Gets together: None     Attends [employer] service: None     Active member of club or organization: None     Attends meetings of clubs or organizations: None     Relationship status: None    Intimate partner violence     Fear of current or ex partner: None     Emotionally abused: None     Physically abused: None     Forced sexual activity: None   Other Topics Concern    None   Social History Narrative    · Most recent tobacco use screenin2020     · Do you currently or have you served in the [employer]:   No      · Were you activated, into active duty, as a member of the [employer] or as a Reservist:   No      · Exercise level:   Occasional      · Diet:   Regular      · General stress level:   Medium      · Caffeine intake: Moderate      · Advance directive:    Yes      · Has the Patient had a mammogram to screen for breast cancer within 24 months:   Yes      · Please enter the date of the Patient's previous mammogram :   2017      · Date of last Colonoscopy:   01-     - As per Kev Adas     ·       Medications and Allergies:     Current Outpatient Medications   Medication Sig Dispense Refill    ALPRAZolam (Xanax) 0 5 mg tablet Take 1 tablet (0 5 mg total) by mouth 2 (two) times a day as needed for anxiety 60 tablet 5    aspirin (ECOTRIN LOW STRENGTH) 81 mg EC tablet Take 1 tablet (81 mg total) by mouth daily 30 tablet 0    atorvastatin (LIPITOR) 20 mg tablet Take 1 tablet (20 mg total) by mouth daily 90 tablet 3    Calcium Carbonate-Vit D-Min (CALTRATE 600+D PLUS MINERALS) 600-800 MG-UNIT TABS Take by mouth daily        Cholecalciferol (VITAMIN D3) 1000 units CAPS Take by mouth daily        famotidine (PEPCID) 20 mg tablet TAKE 2 TABLETS BY MOUTH ONCE DAILY 90 tablet 0    levothyroxine 100 mcg tablet TAKE 1 TABLET BY MOUTH EVERY DAY 90 tablet 1    MAGNESIUM PO Take by mouth daily        metoprolol tartrate (LOPRESSOR) 25 mg tablet TAKE 1/2 TAB TWICE A DAY 90 tablet 3     No current facility-administered medications for this visit  No Known Allergies   Immunizations:     Immunization History   Administered Date(s) Administered    INFLUENZA 12/05/2014, 10/31/2016, 10/09/2018    Pneumococcal Conjugate 13-Valent 01/18/2017, 10/06/2017    Pneumococcal Polysaccharide PPV23 01/22/2020      Health Maintenance: There are no preventive care reminders to display for this patient  Topic Date Due    COVID-19 Vaccine (1) Never done    DTaP,Tdap,and Td Vaccines (1 - Tdap) Never done    Influenza Vaccine (1) 09/01/2020      Medicare Health Risk Assessment:     Resp 16   Ht 5' 2" (1 575 m)   Wt 54 9 kg (121 lb)   LMP  (LMP Unknown)   BMI 22 13 kg/m²      Yuniel Palma is here for her Subsequent Wellness visit  Health Risk Assessment:   Patient rates overall health as very good  Patient feels that their physical health rating is same  Patient is very satisfied with their life  Eyesight was rated as same  Hearing was rated as same   Patient feels that their emotional and mental health rating is same  Patients states they are never, rarely angry  Patient states they are sometimes unusually tired/fatigued  Pain experienced in the last 7 days has been none  Patient states that she has experienced no weight loss or gain in last 6 months  Depression Screening:   PHQ-2 Score: 0      Fall Risk Screening: In the past year, patient has experienced: no history of falling in past year      Urinary Incontinence Screening:   Patient has not leaked urine accidently in the last six months  Home Safety:  Patient does not have trouble with stairs inside or outside of their home  Patient has working smoke alarms and has working carbon monoxide detector  Home safety hazards include: none  Nutrition:   Current diet is Regular  Medications:   Patient is currently taking over-the-counter supplements  OTC medications include: see medication list  Patient is able to manage medications  Activities of Daily Living (ADLs)/Instrumental Activities of Daily Living (IADLs):   Walk and transfer into and out of bed and chair?: Yes  Dress and groom yourself?: Yes    Bathe or shower yourself?: Yes    Feed yourself? Yes  Do your laundry/housekeeping?: Yes  Manage your money, pay your bills and track your expenses?: Yes  Make your own meals?: Yes    Do your own shopping?: Yes    Previous Hospitalizations:   Any hospitalizations or ED visits within the last 12 months?: No      Advance Care Planning:   Living will: Yes    Durable POA for healthcare:  Yes    Advanced directive: Yes    Advanced directive counseling given: Yes    Five wishes given: Yes    Patient declined ACP directive: Yes    End of Life Decisions reviewed with patient: Yes    Provider agrees with end of life decisions: Yes      Cognitive Screening:   Provider or family/friend/caregiver concerned regarding cognition?: No    PREVENTIVE SCREENINGS      Cardiovascular Screening:    General: Screening Not Indicated and History Lipid Disorder      Diabetes Screening:     General: Screening Current      Breast Cancer Screening:     General: Screening Current      Cervical Cancer Screening:    General: Screening Not Indicated      Lung Cancer Screening:     General: Screening Not Indicated    Screening, Brief Intervention, and Referral to Treatment (SBIRT)    Screening  Typical number of drinks in a day: 0  Typical number of drinks in a week: 0  Interpretation: Low risk drinking behavior      Single Item Drug Screening:  How often have you used an illegal drug (including marijuana) or a prescription medication for non-medical reasons in the past year? never    Single Item Drug Screen Score: 0  Interpretation: Negative screen for possible drug use disorder      DARVIN Medellin

## 2021-04-29 DIAGNOSIS — R94.39 ABNORMAL STRESS TEST: ICD-10-CM

## 2021-04-29 DIAGNOSIS — R06.00 DYSPNEA ON EFFORT: ICD-10-CM

## 2021-06-23 DIAGNOSIS — K21.9 GASTROESOPHAGEAL REFLUX DISEASE WITHOUT ESOPHAGITIS: ICD-10-CM

## 2021-06-23 RX ORDER — FAMOTIDINE 20 MG/1
TABLET, FILM COATED ORAL
Qty: 90 TABLET | Refills: 0 | Status: SHIPPED | OUTPATIENT
Start: 2021-06-23 | End: 2021-08-02

## 2021-07-25 LAB
ALBUMIN SERPL-MCNC: 4.3 G/DL (ref 3.6–5.1)
ALBUMIN/GLOB SERPL: 1.5 (CALC) (ref 1–2.5)
ALP SERPL-CCNC: 94 U/L (ref 37–153)
ALT SERPL-CCNC: 18 U/L (ref 6–29)
APPEARANCE UR: CLEAR
AST SERPL-CCNC: 19 U/L (ref 10–35)
BACTERIA UR QL AUTO: ABNORMAL /HPF
BASOPHILS # BLD AUTO: 67 CELLS/UL (ref 0–200)
BASOPHILS NFR BLD AUTO: 1 %
BILIRUB SERPL-MCNC: 0.6 MG/DL (ref 0.2–1.2)
BILIRUB UR QL STRIP: NEGATIVE
BUN SERPL-MCNC: 17 MG/DL (ref 7–25)
BUN/CREAT SERPL: 17 (CALC) (ref 6–22)
CALCIUM SERPL-MCNC: 9.7 MG/DL (ref 8.6–10.4)
CHLORIDE SERPL-SCNC: 102 MMOL/L (ref 98–110)
CHOLEST SERPL-MCNC: 121 MG/DL
CHOLEST/HDLC SERPL: 2.1 (CALC)
CO2 SERPL-SCNC: 26 MMOL/L (ref 20–32)
COLOR UR: YELLOW
CREAT SERPL-MCNC: 1.01 MG/DL (ref 0.6–0.88)
EOSINOPHIL # BLD AUTO: 168 CELLS/UL (ref 15–500)
EOSINOPHIL NFR BLD AUTO: 2.5 %
ERYTHROCYTE [DISTWIDTH] IN BLOOD BY AUTOMATED COUNT: 11.3 % (ref 11–15)
GLOBULIN SER CALC-MCNC: 2.9 G/DL (CALC) (ref 1.9–3.7)
GLUCOSE SERPL-MCNC: 88 MG/DL (ref 65–99)
GLUCOSE UR QL STRIP: NEGATIVE
HCT VFR BLD AUTO: 40.3 % (ref 35–45)
HDLC SERPL-MCNC: 58 MG/DL
HGB BLD-MCNC: 13.3 G/DL (ref 11.7–15.5)
HGB UR QL STRIP: NEGATIVE
HYALINE CASTS #/AREA URNS LPF: ABNORMAL /LPF
KETONES UR QL STRIP: NEGATIVE
LDLC SERPL CALC-MCNC: 47 MG/DL (CALC)
LEUKOCYTE ESTERASE UR QL STRIP: ABNORMAL
LYMPHOCYTES # BLD AUTO: 1521 CELLS/UL (ref 850–3900)
LYMPHOCYTES NFR BLD AUTO: 22.7 %
MCH RBC QN AUTO: 29.7 PG (ref 27–33)
MCHC RBC AUTO-ENTMCNC: 33 G/DL (ref 32–36)
MCV RBC AUTO: 90 FL (ref 80–100)
MONOCYTES # BLD AUTO: 449 CELLS/UL (ref 200–950)
MONOCYTES NFR BLD AUTO: 6.7 %
NEUTROPHILS # BLD AUTO: 4496 CELLS/UL (ref 1500–7800)
NEUTROPHILS NFR BLD AUTO: 67.1 %
NITRITE UR QL STRIP: NEGATIVE
NONHDLC SERPL-MCNC: 63 MG/DL (CALC)
PH UR STRIP: 5.5 [PH] (ref 5–8)
PLATELET # BLD AUTO: 375 THOUSAND/UL (ref 140–400)
PMV BLD REES-ECKER: 10 FL (ref 7.5–12.5)
POTASSIUM SERPL-SCNC: 4.2 MMOL/L (ref 3.5–5.3)
PROT SERPL-MCNC: 7.2 G/DL (ref 6.1–8.1)
PROT UR QL STRIP: NEGATIVE
RBC # BLD AUTO: 4.48 MILLION/UL (ref 3.8–5.1)
RBC #/AREA URNS HPF: ABNORMAL /HPF
SL AMB EGFR AFRICAN AMERICAN: 60 ML/MIN/1.73M2
SL AMB EGFR NON AFRICAN AMERICAN: 52 ML/MIN/1.73M2
SODIUM SERPL-SCNC: 136 MMOL/L (ref 135–146)
SP GR UR STRIP: 1 (ref 1–1.03)
SQUAMOUS #/AREA URNS HPF: ABNORMAL /HPF
TRIGL SERPL-MCNC: 76 MG/DL
TSH SERPL-ACNC: 0.13 MIU/L (ref 0.4–4.5)
WBC # BLD AUTO: 6.7 THOUSAND/UL (ref 3.8–10.8)
WBC #/AREA URNS HPF: ABNORMAL /HPF

## 2021-07-27 ENCOUNTER — OFFICE VISIT (OUTPATIENT)
Dept: FAMILY MEDICINE CLINIC | Facility: CLINIC | Age: 81
End: 2021-07-27
Payer: MEDICARE

## 2021-07-27 VITALS
DIASTOLIC BLOOD PRESSURE: 60 MMHG | HEIGHT: 62 IN | TEMPERATURE: 98.1 F | RESPIRATION RATE: 16 BRPM | SYSTOLIC BLOOD PRESSURE: 118 MMHG | WEIGHT: 121 LBS | BODY MASS INDEX: 22.26 KG/M2 | OXYGEN SATURATION: 98 % | HEART RATE: 68 BPM

## 2021-07-27 DIAGNOSIS — E78.2 MIXED HYPERLIPIDEMIA: ICD-10-CM

## 2021-07-27 DIAGNOSIS — F41.9 ANXIETY: ICD-10-CM

## 2021-07-27 DIAGNOSIS — E03.9 HYPOTHYROIDISM, UNSPECIFIED TYPE: ICD-10-CM

## 2021-07-27 DIAGNOSIS — I10 ESSENTIAL HYPERTENSION: Primary | ICD-10-CM

## 2021-07-27 PROCEDURE — 99214 OFFICE O/P EST MOD 30 MIN: CPT | Performed by: NURSE PRACTITIONER

## 2021-07-27 RX ORDER — LEVOTHYROXINE SODIUM 0.1 MG/1
100 TABLET ORAL DAILY
Qty: 90 TABLET | Refills: 1 | Status: SHIPPED | OUTPATIENT
Start: 2021-07-27 | End: 2022-01-10

## 2021-07-27 RX ORDER — LEVOTHYROXINE SODIUM 88 UG/1
88 TABLET ORAL DAILY
Qty: 90 TABLET | Refills: 1 | Status: SHIPPED | OUTPATIENT
Start: 2021-07-27 | End: 2022-02-14

## 2021-07-27 NOTE — PROGRESS NOTES
Assessment/Plan:    Physical assessment is unremarkable today  Patient's labs reviewed her thyroid is determined to be on the low side advised that will decrease the levothyroxine to 88 mics Tuesday Thursday Saturday and Sunday and remaining at the 100 mics Monday Wednesday Friday  Will recheck her levels in 3-6 months patient is advised to contact me if she becomes symptomatic for hypothyroidism  All other labs were found to be within normal limits patient is advised of this patient declines any need for routine refills of her medication her cholesterol are hypertension  all remains stable  Patient also states that her reflux and the anxiety are currently managed very well on her stable  Patient is advised that will complete routine labs in 6 months the next office visit will be in 3 months for blood pressure check and a tolerance of medication check  All questions were answered patient verbalized that she is very happy with the outcome of today's visit  She will contact me if she needs anything in the meantime  Dosing all possible side effects of the prescribed medications or medications that had been prescribed in the past were reviewed and all questions were answered  Patient verbalized agreement and understanding of the plan of care as outlined during the office visit today return to office as indicated or sooner if a problem arises         Problem List Items Addressed This Visit        Other    Anxiety    Hyperlipidemia    Relevant Medications    levothyroxine 88 mcg tablet    levothyroxine 100 mcg tablet    Other Relevant Orders    CBC and differential    Comprehensive metabolic panel    Lipid panel    UA w Reflex to Microscopic w Reflex to Culture    TSH, 3rd generation with Free T4 reflex      Other Visit Diagnoses     Essential hypertension    -  Primary    Relevant Medications    levothyroxine 88 mcg tablet    levothyroxine 100 mcg tablet    Other Relevant Orders    CBC and differential Comprehensive metabolic panel    Lipid panel    UA w Reflex to Microscopic w Reflex to Culture    TSH, 3rd generation with Free T4 reflex    Hypothyroidism, unspecified type        Relevant Medications    levothyroxine 88 mcg tablet    levothyroxine 100 mcg tablet    Other Relevant Orders    TSH, 3rd generation with Free T4 reflex            Subjective:      Patient ID: Linda Odonnell is a 79 y o  female  Dosing all possible side effects of the prescribed medications or medications that had been prescribed in the past were reviewed and all questions were answered  Patient verbalized agreement and understanding of the plan of care as outlined during the office visit today return to office as indicated or sooner if a problem arises  The following portions of the patient's history were reviewed and updated as appropriate: allergies, current medications, past family history, past medical history, past social history, past surgical history and problem list     Review of Systems   Constitutional: Negative for appetite change and fever  HENT: Negative for sinus pressure and sore throat  Eyes: Negative for pain  Respiratory: Negative for shortness of breath  Cardiovascular: Negative for chest pain  Gastrointestinal: Negative for abdominal pain  Genitourinary: Negative for dysuria  Musculoskeletal: Negative for arthralgias and myalgias  Skin: Negative for color change  Neurological: Negative for light-headedness  Psychiatric/Behavioral: Negative for behavioral problems  Objective:      /60 (BP Location: Left arm, Patient Position: Sitting, Cuff Size: Standard)   Pulse 68   Temp 98 1 °F (36 7 °C) (Temporal)   Resp 16   Ht 5' 2" (1 575 m)   Wt 54 9 kg (121 lb)   LMP  (LMP Unknown)   SpO2 98%   BMI 22 13 kg/m²          Physical Exam  Vitals and nursing note reviewed  Constitutional:       General: She is not in acute distress  Appearance: She is well-developed  She is not diaphoretic  HENT:      Head: Normocephalic and atraumatic  Mouth/Throat:      Pharynx: Oropharynx is clear  Eyes:      Pupils: Pupils are equal, round, and reactive to light  Cardiovascular:      Rate and Rhythm: Normal rate and regular rhythm  Pulses: Normal pulses  Heart sounds: Normal heart sounds  Pulmonary:      Effort: Pulmonary effort is normal       Breath sounds: Normal breath sounds  Abdominal:      Palpations: Abdomen is soft  Musculoskeletal:         General: Normal range of motion  Cervical back: Normal range of motion  Skin:     General: Skin is dry  Neurological:      General: No focal deficit present  Mental Status: She is alert and oriented to person, place, and time  Psychiatric:         Behavior: Behavior normal          Thought Content:  Thought content normal

## 2021-08-01 DIAGNOSIS — K21.9 GASTROESOPHAGEAL REFLUX DISEASE WITHOUT ESOPHAGITIS: ICD-10-CM

## 2021-08-02 RX ORDER — FAMOTIDINE 20 MG/1
TABLET, FILM COATED ORAL
Qty: 90 TABLET | Refills: 0 | Status: SHIPPED | OUTPATIENT
Start: 2021-08-02 | End: 2021-09-09

## 2021-09-09 DIAGNOSIS — K21.9 GASTROESOPHAGEAL REFLUX DISEASE WITHOUT ESOPHAGITIS: ICD-10-CM

## 2021-09-09 RX ORDER — FAMOTIDINE 20 MG/1
TABLET, FILM COATED ORAL
Qty: 90 TABLET | Refills: 0 | Status: SHIPPED | OUTPATIENT
Start: 2021-09-09 | End: 2021-10-18

## 2021-10-13 DIAGNOSIS — E78.5 HYPERLIPIDEMIA, UNSPECIFIED HYPERLIPIDEMIA TYPE: ICD-10-CM

## 2021-10-13 RX ORDER — ATORVASTATIN CALCIUM 20 MG/1
TABLET, FILM COATED ORAL
Qty: 90 TABLET | Refills: 3 | Status: SHIPPED | OUTPATIENT
Start: 2021-10-13

## 2021-10-16 DIAGNOSIS — K21.9 GASTROESOPHAGEAL REFLUX DISEASE WITHOUT ESOPHAGITIS: ICD-10-CM

## 2021-10-18 RX ORDER — FAMOTIDINE 20 MG/1
TABLET, FILM COATED ORAL
Qty: 90 TABLET | Refills: 0 | Status: SHIPPED | OUTPATIENT
Start: 2021-10-18 | End: 2021-11-24

## 2021-10-27 ENCOUNTER — OFFICE VISIT (OUTPATIENT)
Dept: FAMILY MEDICINE CLINIC | Facility: CLINIC | Age: 81
End: 2021-10-27
Payer: MEDICARE

## 2021-10-27 VITALS
TEMPERATURE: 98.2 F | SYSTOLIC BLOOD PRESSURE: 122 MMHG | DIASTOLIC BLOOD PRESSURE: 70 MMHG | HEART RATE: 63 BPM | OXYGEN SATURATION: 99 % | WEIGHT: 127 LBS | HEIGHT: 62 IN | BODY MASS INDEX: 23.37 KG/M2

## 2021-10-27 DIAGNOSIS — E03.8 OTHER SPECIFIED HYPOTHYROIDISM: ICD-10-CM

## 2021-10-27 DIAGNOSIS — Z23 IMMUNIZATION DUE: Primary | ICD-10-CM

## 2021-10-27 PROCEDURE — G0008 ADMIN INFLUENZA VIRUS VAC: HCPCS | Performed by: NURSE PRACTITIONER

## 2021-10-27 PROCEDURE — 99213 OFFICE O/P EST LOW 20 MIN: CPT | Performed by: NURSE PRACTITIONER

## 2021-10-27 PROCEDURE — 90662 IIV NO PRSV INCREASED AG IM: CPT | Performed by: NURSE PRACTITIONER

## 2021-11-13 DIAGNOSIS — F41.9 ANXIETY: ICD-10-CM

## 2021-11-16 RX ORDER — ALPRAZOLAM 0.5 MG/1
0.5 TABLET ORAL 2 TIMES DAILY PRN
Qty: 60 TABLET | Refills: 0 | Status: SHIPPED | OUTPATIENT
Start: 2021-11-16 | End: 2021-12-15 | Stop reason: SDUPTHER

## 2021-11-24 DIAGNOSIS — K21.9 GASTROESOPHAGEAL REFLUX DISEASE WITHOUT ESOPHAGITIS: ICD-10-CM

## 2021-11-24 RX ORDER — FAMOTIDINE 20 MG/1
TABLET, FILM COATED ORAL
Qty: 90 TABLET | Refills: 0 | Status: SHIPPED | OUTPATIENT
Start: 2021-11-24 | End: 2022-01-03

## 2021-12-15 DIAGNOSIS — F41.9 ANXIETY: ICD-10-CM

## 2021-12-15 RX ORDER — ALPRAZOLAM 0.5 MG/1
0.5 TABLET ORAL 2 TIMES DAILY PRN
Qty: 180 TABLET | Refills: 0 | Status: SHIPPED | OUTPATIENT
Start: 2021-12-15 | End: 2022-03-14 | Stop reason: SDUPTHER

## 2021-12-31 DIAGNOSIS — K21.9 GASTROESOPHAGEAL REFLUX DISEASE WITHOUT ESOPHAGITIS: ICD-10-CM

## 2022-01-03 RX ORDER — FAMOTIDINE 20 MG/1
TABLET, FILM COATED ORAL
Qty: 90 TABLET | Refills: 0 | Status: SHIPPED | OUTPATIENT
Start: 2022-01-03 | End: 2022-02-09

## 2022-01-09 DIAGNOSIS — E78.2 MIXED HYPERLIPIDEMIA: ICD-10-CM

## 2022-01-09 DIAGNOSIS — E03.9 HYPOTHYROIDISM, UNSPECIFIED TYPE: ICD-10-CM

## 2022-01-09 DIAGNOSIS — I10 ESSENTIAL HYPERTENSION: ICD-10-CM

## 2022-01-10 RX ORDER — LEVOTHYROXINE SODIUM 0.1 MG/1
TABLET ORAL
Qty: 90 TABLET | Refills: 1 | Status: SHIPPED | OUTPATIENT
Start: 2022-01-10 | End: 2022-07-25 | Stop reason: SDUPTHER

## 2022-01-20 LAB
ALBUMIN SERPL-MCNC: 4.1 G/DL (ref 3.6–5.1)
ALBUMIN/GLOB SERPL: 1.6 (CALC) (ref 1–2.5)
ALP SERPL-CCNC: 94 U/L (ref 37–153)
ALT SERPL-CCNC: 20 U/L (ref 6–29)
AST SERPL-CCNC: 19 U/L (ref 10–35)
BASOPHILS # BLD AUTO: 47 CELLS/UL (ref 0–200)
BASOPHILS NFR BLD AUTO: 0.8 %
BILIRUB SERPL-MCNC: 0.6 MG/DL (ref 0.2–1.2)
BUN SERPL-MCNC: 16 MG/DL (ref 7–25)
BUN/CREAT SERPL: 16 (CALC) (ref 6–22)
CALCIUM SERPL-MCNC: 9.6 MG/DL (ref 8.6–10.4)
CHLORIDE SERPL-SCNC: 104 MMOL/L (ref 98–110)
CHOLEST SERPL-MCNC: 124 MG/DL
CHOLEST/HDLC SERPL: 2 (CALC)
CO2 SERPL-SCNC: 27 MMOL/L (ref 20–32)
CREAT SERPL-MCNC: 0.97 MG/DL (ref 0.6–0.88)
EOSINOPHIL # BLD AUTO: 59 CELLS/UL (ref 15–500)
EOSINOPHIL NFR BLD AUTO: 1 %
ERYTHROCYTE [DISTWIDTH] IN BLOOD BY AUTOMATED COUNT: 11.4 % (ref 11–15)
GLOBULIN SER CALC-MCNC: 2.6 G/DL (CALC) (ref 1.9–3.7)
GLUCOSE SERPL-MCNC: 89 MG/DL (ref 65–99)
HCT VFR BLD AUTO: 38.4 % (ref 35–45)
HDLC SERPL-MCNC: 63 MG/DL
HGB BLD-MCNC: 12.9 G/DL (ref 11.7–15.5)
LDLC SERPL CALC-MCNC: 47 MG/DL (CALC)
LYMPHOCYTES # BLD AUTO: 1847 CELLS/UL (ref 850–3900)
LYMPHOCYTES NFR BLD AUTO: 31.3 %
MCH RBC QN AUTO: 30.1 PG (ref 27–33)
MCHC RBC AUTO-ENTMCNC: 33.6 G/DL (ref 32–36)
MCV RBC AUTO: 89.5 FL (ref 80–100)
MONOCYTES # BLD AUTO: 460 CELLS/UL (ref 200–950)
MONOCYTES NFR BLD AUTO: 7.8 %
NEUTROPHILS # BLD AUTO: 3487 CELLS/UL (ref 1500–7800)
NEUTROPHILS NFR BLD AUTO: 59.1 %
NONHDLC SERPL-MCNC: 61 MG/DL (CALC)
PLATELET # BLD AUTO: 339 THOUSAND/UL (ref 140–400)
PMV BLD REES-ECKER: 10.3 FL (ref 7.5–12.5)
POTASSIUM SERPL-SCNC: 4.8 MMOL/L (ref 3.5–5.3)
PROT SERPL-MCNC: 6.7 G/DL (ref 6.1–8.1)
RBC # BLD AUTO: 4.29 MILLION/UL (ref 3.8–5.1)
SL AMB EGFR AFRICAN AMERICAN: 63 ML/MIN/1.73M2
SL AMB EGFR NON AFRICAN AMERICAN: 54 ML/MIN/1.73M2
SODIUM SERPL-SCNC: 138 MMOL/L (ref 135–146)
T4 FREE SERPL-MCNC: 1.6 NG/DL (ref 0.8–1.8)
TRIGL SERPL-MCNC: 67 MG/DL
TSH SERPL-ACNC: 0.13 MIU/L (ref 0.4–4.5)
WBC # BLD AUTO: 5.9 THOUSAND/UL (ref 3.8–10.8)

## 2022-01-21 LAB
APPEARANCE UR: CLEAR
BACTERIA UR QL AUTO: ABNORMAL /HPF
BILIRUB UR QL STRIP: NEGATIVE
COLOR UR: YELLOW
GLUCOSE UR QL STRIP: NEGATIVE
HGB UR QL STRIP: NEGATIVE
HYALINE CASTS #/AREA URNS LPF: ABNORMAL /LPF
KETONES UR QL STRIP: NEGATIVE
LEUKOCYTE ESTERASE UR QL STRIP: ABNORMAL
NITRITE UR QL STRIP: NEGATIVE
PH UR STRIP: 6 [PH] (ref 5–8)
PROT UR QL STRIP: NEGATIVE
RBC #/AREA URNS HPF: ABNORMAL /HPF
SP GR UR STRIP: 1 (ref 1–1.03)
SQUAMOUS #/AREA URNS HPF: ABNORMAL /HPF
WBC #/AREA URNS HPF: ABNORMAL /HPF

## 2022-01-24 ENCOUNTER — OFFICE VISIT (OUTPATIENT)
Dept: FAMILY MEDICINE CLINIC | Facility: CLINIC | Age: 82
End: 2022-01-24
Payer: MEDICARE

## 2022-01-24 VITALS
BODY MASS INDEX: 23 KG/M2 | HEART RATE: 60 BPM | DIASTOLIC BLOOD PRESSURE: 60 MMHG | OXYGEN SATURATION: 99 % | SYSTOLIC BLOOD PRESSURE: 110 MMHG | WEIGHT: 125 LBS | RESPIRATION RATE: 16 BRPM | HEIGHT: 62 IN | TEMPERATURE: 97.3 F

## 2022-01-24 DIAGNOSIS — E78.2 MIXED HYPERLIPIDEMIA: Primary | ICD-10-CM

## 2022-01-24 DIAGNOSIS — E03.8 OTHER SPECIFIED HYPOTHYROIDISM: ICD-10-CM

## 2022-01-24 DIAGNOSIS — I25.10 CORONARY ARTERY DISEASE INVOLVING NATIVE CORONARY ARTERY OF NATIVE HEART WITHOUT ANGINA PECTORIS: ICD-10-CM

## 2022-01-24 DIAGNOSIS — M81.0 POST-MENOPAUSAL OSTEOPOROSIS: ICD-10-CM

## 2022-01-24 PROCEDURE — 99213 OFFICE O/P EST LOW 20 MIN: CPT | Performed by: NURSE PRACTITIONER

## 2022-01-24 NOTE — PROGRESS NOTES
Assessment/Plan:  Mixed hyperlipidemia  Stable  Patient's lab was reviewed found to be within normal limits blood as the cholesterol very well controlled patient tolerates the 20 mg Lipitor no need for refill at this visit  Coronary artery Disease  Stable  Patient's lab was reviewed found to be within normal limits blood as the cholesterol very well controlled patient tolerates the 20 mg Lipitor no need for refill at this visit  Hypothyroidism  Stable  TSH and T4 were reviewed T4 within normal limits TSH slightly on the low side  Patient was advised of this  Patient does take 100 and 88 mcg is alternating days of the levothyroxine advised to continue this I will recheck her labs again in 6 months  Patient is also advised to contact me with any symptoms such as sweating heat intolerance hair loss anxiety patient denies all of these at this visit  Postmenopausal osteoporosis  Stable  Patient is due for DEXA scan advised will put that in her chart she has complete that at her earliest convenience will contact her with the time in the day for the appointment  Until such time patient is advised to take 1200 mg of calcium D3 supplement on a daily basis  If cannot swallow that much she is advised to take at least 650 daily  Dosing all possible side effects of the prescribed medications or medications that had been prescribed in the past were reviewed and all questions were answered  Patient verbalized agreement and understanding of the plan of care as outlined during the office visit today return to office as indicated or sooner if a problem arises           Problem List Items Addressed This Visit        Endocrine    Other specified hypothyroidism       Cardiovascular and Mediastinum    Coronary artery disease involving native coronary artery of native heart without angina pectoris       Other    Hyperlipidemia - Primary      Other Visit Diagnoses     Post-menopausal osteoporosis Subjective:      Patient ID: Zakiya Willingham is a 79 y o  female  Patient is here for routine follow-up  To review most recent labs  To also discuss current state of health and any new problems that they may be experiencing  Patient states that medications taken as prescribed and very well tolerated no new complaints at this time  The following portions of the patient's history were reviewed and updated as appropriate: allergies, current medications, past family history, past medical history, past social history, past surgical history and problem list     Review of Systems   Constitutional: Negative for appetite change and fever  HENT: Negative for sinus pressure and sore throat  Eyes: Negative for pain  Respiratory: Negative for shortness of breath  Cardiovascular: Negative for chest pain  Gastrointestinal: Negative for abdominal pain  Genitourinary: Negative for dysuria  Musculoskeletal: Negative for arthralgias and myalgias  Skin: Negative for color change  Neurological: Negative for light-headedness  Psychiatric/Behavioral: Negative for behavioral problems  Objective:      /60 (BP Location: Left arm, Patient Position: Sitting, Cuff Size: Standard)   Pulse 60   Temp (!) 97 3 °F (36 3 °C) (Temporal)   Resp 16   Ht 5' 2" (1 575 m)   Wt 56 7 kg (125 lb)   LMP  (LMP Unknown)   SpO2 99%   BMI 22 86 kg/m²          Physical Exam  Vitals and nursing note reviewed  Constitutional:       General: She is not in acute distress  Appearance: She is well-developed  She is not diaphoretic  HENT:      Head: Normocephalic and atraumatic  Right Ear: External ear normal  There is no impacted cerumen  Left Ear: External ear normal  There is no impacted cerumen  Ears:      Comments: Cerumen present but not impacted advised will need to have her ears flushed at next office visit  Mouth/Throat:      Pharynx: Oropharynx is clear     Eyes: Pupils: Pupils are equal, round, and reactive to light  Cardiovascular:      Rate and Rhythm: Normal rate and regular rhythm  Pulses: Normal pulses  Heart sounds: Normal heart sounds  Pulmonary:      Effort: Pulmonary effort is normal       Breath sounds: Normal breath sounds  Abdominal:      Palpations: Abdomen is soft  Musculoskeletal:         General: Normal range of motion  Cervical back: Normal range of motion and neck supple  Skin:     General: Skin is dry  Neurological:      Mental Status: She is alert and oriented to person, place, and time  Psychiatric:         Behavior: Behavior normal          Thought Content:  Thought content normal

## 2022-02-09 DIAGNOSIS — K21.9 GASTROESOPHAGEAL REFLUX DISEASE WITHOUT ESOPHAGITIS: ICD-10-CM

## 2022-02-09 RX ORDER — FAMOTIDINE 20 MG/1
TABLET, FILM COATED ORAL
Qty: 90 TABLET | Refills: 0 | Status: SHIPPED | OUTPATIENT
Start: 2022-02-09 | End: 2022-02-23

## 2022-02-12 DIAGNOSIS — E78.2 MIXED HYPERLIPIDEMIA: ICD-10-CM

## 2022-02-12 DIAGNOSIS — R94.39 ABNORMAL STRESS TEST: ICD-10-CM

## 2022-02-12 DIAGNOSIS — R06.00 DYSPNEA ON EFFORT: ICD-10-CM

## 2022-02-12 DIAGNOSIS — I10 ESSENTIAL HYPERTENSION: ICD-10-CM

## 2022-02-14 RX ORDER — LEVOTHYROXINE SODIUM 88 UG/1
TABLET ORAL
Qty: 64 TABLET | Refills: 2 | Status: SHIPPED | OUTPATIENT
Start: 2022-02-14 | End: 2022-07-25 | Stop reason: SDUPTHER

## 2022-02-23 DIAGNOSIS — K21.9 GASTROESOPHAGEAL REFLUX DISEASE WITHOUT ESOPHAGITIS: ICD-10-CM

## 2022-02-23 RX ORDER — FAMOTIDINE 20 MG/1
TABLET, FILM COATED ORAL
Qty: 180 TABLET | Refills: 1 | Status: SHIPPED | OUTPATIENT
Start: 2022-02-23

## 2022-03-14 DIAGNOSIS — F41.9 ANXIETY: ICD-10-CM

## 2022-03-14 RX ORDER — ALPRAZOLAM 0.5 MG/1
0.5 TABLET ORAL 2 TIMES DAILY PRN
Qty: 180 TABLET | Refills: 0 | Status: SHIPPED | OUTPATIENT
Start: 2022-03-14 | End: 2022-07-07 | Stop reason: SDUPTHER

## 2022-07-07 DIAGNOSIS — F41.9 ANXIETY: ICD-10-CM

## 2022-07-08 RX ORDER — ALPRAZOLAM 0.5 MG/1
0.5 TABLET ORAL 2 TIMES DAILY PRN
Qty: 180 TABLET | Refills: 0 | Status: SHIPPED | OUTPATIENT
Start: 2022-07-08 | End: 2022-10-13 | Stop reason: SDUPTHER

## 2022-07-25 ENCOUNTER — OFFICE VISIT (OUTPATIENT)
Dept: FAMILY MEDICINE CLINIC | Facility: CLINIC | Age: 82
End: 2022-07-25
Payer: MEDICARE

## 2022-07-25 VITALS
BODY MASS INDEX: 21.53 KG/M2 | DIASTOLIC BLOOD PRESSURE: 62 MMHG | RESPIRATION RATE: 16 BRPM | HEIGHT: 62 IN | OXYGEN SATURATION: 99 % | HEART RATE: 58 BPM | SYSTOLIC BLOOD PRESSURE: 110 MMHG | WEIGHT: 117 LBS | TEMPERATURE: 98.2 F

## 2022-07-25 DIAGNOSIS — E03.9 HYPOTHYROIDISM, UNSPECIFIED TYPE: ICD-10-CM

## 2022-07-25 DIAGNOSIS — E03.8 OTHER SPECIFIED HYPOTHYROIDISM: ICD-10-CM

## 2022-07-25 DIAGNOSIS — I10 ESSENTIAL HYPERTENSION: ICD-10-CM

## 2022-07-25 DIAGNOSIS — E78.2 MIXED HYPERLIPIDEMIA: ICD-10-CM

## 2022-07-25 DIAGNOSIS — Z00.00 MEDICARE ANNUAL WELLNESS VISIT, SUBSEQUENT: Primary | ICD-10-CM

## 2022-07-25 DIAGNOSIS — F41.9 ANXIETY: ICD-10-CM

## 2022-07-25 PROCEDURE — 1123F ACP DISCUSS/DSCN MKR DOCD: CPT | Performed by: NURSE PRACTITIONER

## 2022-07-25 PROCEDURE — G0439 PPPS, SUBSEQ VISIT: HCPCS | Performed by: NURSE PRACTITIONER

## 2022-07-25 PROCEDURE — 99214 OFFICE O/P EST MOD 30 MIN: CPT | Performed by: NURSE PRACTITIONER

## 2022-07-25 RX ORDER — LEVOTHYROXINE SODIUM 0.1 MG/1
100 TABLET ORAL DAILY
Qty: 90 TABLET | Refills: 1 | Status: SHIPPED | OUTPATIENT
Start: 2022-07-25

## 2022-07-25 RX ORDER — LEVOTHYROXINE SODIUM 88 UG/1
TABLET ORAL
Qty: 90 TABLET | Refills: 1 | Status: SHIPPED | OUTPATIENT
Start: 2022-07-25

## 2022-07-25 NOTE — PATIENT INSTRUCTIONS
Medicare Preventive Visit Patient Instructions  Thank you for completing your Welcome to Medicare Visit or Medicare Annual Wellness Visit today  Your next wellness visit will be due in one year (7/26/2023)  The screening/preventive services that you may require over the next 5-10 years are detailed below  Some tests may not apply to you based off risk factors and/or age  Screening tests ordered at today's visit but not completed yet may show as past due  Also, please note that scanned in results may not display below  Preventive Screenings:  Service Recommendations Previous Testing/Comments   Colorectal Cancer Screening  * Colonoscopy    * Fecal Occult Blood Test (FOBT)/Fecal Immunochemical Test (FIT)  * Fecal DNA/Cologuard Test  * Flexible Sigmoidoscopy Age: 54-65 years old   Colonoscopy: every 10 years (may be performed more frequently if at higher risk)  OR  FOBT/FIT: every 1 year  OR  Cologuard: every 3 years  OR  Sigmoidoscopy: every 5 years  Screening may be recommended earlier than age 48 if at higher risk for colorectal cancer  Also, an individualized decision between you and your healthcare provider will decide whether screening between the ages of 74-80 would be appropriate  Colonoscopy: Not on file  FOBT/FIT: Not on file  Cologuard: Not on file  Sigmoidoscopy: Not on file          Breast Cancer Screening Age: 36 years old  Frequency: every 1-2 years  Not required if history of left and right mastectomy Mammogram: 11/19/2020    Screening Current   Cervical Cancer Screening Between the ages of 21-29, pap smear recommended once every 3 years  Between the ages of 33-67, can perform pap smear with HPV co-testing every 5 years     Recommendations may differ for women with a history of total hysterectomy, cervical cancer, or abnormal pap smears in past  Pap Smear: 12/10/2018    Screening Not Indicated   Hepatitis C Screening Once for adults born between 1945 and 1965  More frequently in patients at high risk for Hepatitis C Hep C Antibody: Not on file        Diabetes Screening 1-2 times per year if you're at risk for diabetes or have pre-diabetes Fasting glucose: 90 mg/dL   A1C: No results in last 5 years    Screening Current   Cholesterol Screening Once every 5 years if you don't have a lipid disorder  May order more often based on risk factors  Lipid panel: 01/19/2022    Screening Not Indicated  History Lipid Disorder     Other Preventive Screenings Covered by Medicare:  1  Abdominal Aortic Aneurysm (AAA) Screening: covered once if your at risk  You're considered to be at risk if you have a family history of AAA  2  Lung Cancer Screening: covers low dose CT scan once per year if you meet all of the following conditions: (1) Age 50-69; (2) No signs or symptoms of lung cancer; (3) Current smoker or have quit smoking within the last 15 years; (4) You have a tobacco smoking history of at least 30 pack years (packs per day multiplied by number of years you smoked); (5) You get a written order from a healthcare provider  3  Glaucoma Screening: covered annually if you're considered high risk: (1) You have diabetes OR (2) Family history of glaucoma OR (3)  aged 48 and older OR (3)  American aged 72 and older  3  Osteoporosis Screening: covered every 2 years if you meet one of the following conditions: (1) You're estrogen deficient and at risk for osteoporosis based off medical history and other findings; (2) Have a vertebral abnormality; (3) On glucocorticoid therapy for more than 3 months; (4) Have primary hyperparathyroidism; (5) On osteoporosis medications and need to assess response to drug therapy  · Last bone density test (DXA Scan): Not on file  5  HIV Screening: covered annually if you're between the age of 12-76  Also covered annually if you are younger than 13 and older than 72 with risk factors for HIV infection   For pregnant patients, it is covered up to 3 times per pregnancy  Immunizations:  Immunization Recommendations   Influenza Vaccine Annual influenza vaccination during flu season is recommended for all persons aged >= 6 months who do not have contraindications   Pneumococcal Vaccine (Prevnar and Pneumovax)  * Prevnar = PCV13  * Pneumovax = PPSV23   Adults 25-60 years old: 1-3 doses may be recommended based on certain risk factors  Adults 72 years old: Prevnar (PCV13) vaccine recommended followed by Pneumovax (PPSV23) vaccine  If already received PPSV23 since turning 65, then PCV13 recommended at least one year after PPSV23 dose  Hepatitis B Vaccine 3 dose series if at intermediate or high risk (ex: diabetes, end stage renal disease, liver disease)   Tetanus (Td) Vaccine - COST NOT COVERED BY MEDICARE PART B Following completion of primary series, a booster dose should be given every 10 years to maintain immunity against tetanus  Td may also be given as tetanus wound prophylaxis  Tdap Vaccine - COST NOT COVERED BY MEDICARE PART B Recommended at least once for all adults  For pregnant patients, recommended with each pregnancy  Shingles Vaccine (Shingrix) - COST NOT COVERED BY MEDICARE PART B  2 shot series recommended in those aged 48 and above     Health Maintenance Due:  There are no preventive care reminders to display for this patient  Immunizations Due:      Topic Date Due    COVID-19 Vaccine (3 - Booster for Moderna series) 11/07/2021    Influenza Vaccine (1) 09/01/2022     Advance Directives   What are advance directives? Advance directives are legal documents that state your wishes and plans for medical care  These plans are made ahead of time in case you lose your ability to make decisions for yourself  Advance directives can apply to any medical decision, such as the treatments you want, and if you want to donate organs  What are the types of advance directives?   There are many types of advance directives, and each state has rules about how to use them  You may choose a combination of any of the following:  · Living will: This is a written record of the treatment you want  You can also choose which treatments you do not want, which to limit, and which to stop at a certain time  This includes surgery, medicine, IV fluid, and tube feedings  · Durable power of  for healthcare [facility]): This is a written record that states who you want to make healthcare choices for you when you are unable to make them for yourself  This person, called a proxy, is usually a family member or a friend  You may choose more than 1 proxy  · Do not resuscitate (DNR) order:  A DNR order is used in case your heart stops beating or you stop breathing  It is a request not to have certain forms of treatment, such as CPR  A DNR order may be included in other types of advance directives  · Medical directive: This covers the care that you want if you are in a coma, near death, or unable to make decisions for yourself  You can list the treatments you want for each condition  Treatment may include pain medicine, surgery, blood transfusions, dialysis, IV or tube feedings, and a ventilator (breathing machine)  · Values history: This document has questions about your views, beliefs, and how you feel and think about life  This information can help others choose the care that you would choose  Why are advance directives important? An advance directive helps you control your care  Although spoken wishes may be used, it is better to have your wishes written down  Spoken wishes can be misunderstood, or not followed  Treatments may be given even if you do not want them  An advance directive may make it easier for your family to make difficult choices about your care  © Copyright [employer] 2018 Information is for End User's use only and may not be sold, redistributed or otherwise used for commercial purposes   All illustrations and images included in CareNotes® are the copyrighted property of [employer]  or [address] [place], [place] [address]c[facility]

## 2022-07-25 NOTE — PROGRESS NOTES
Assessment and Plan:     Anxiety  Hypothyroidism  Hypertension  Hyperlipidemia  Patient is here for follow-up for Medicare wellness and also to review her a current health status physical assessment was unremarkable  Patient's vital signs well within normal limits  Patient's medication is refilled as needed  Her hypothyroidism hypertension hyperlipidemia and anxiety are all stable no medication dosage changes today  Routine labs were not completed for this office visit none to review did advise with stability of her thyroid disease will refill her medication at current dosage and make any changes according to the results of the labs  Routine labs were placed in her chart and handed to her she is advised to complete those at her earliest convenience on will contact her with results when available  Otherwise she is very happy with the outcome of today's visit like her to follow up back with me in the office in 6 months or sooner if need be  All questions answered she is very happy with the outcome of today's visit  Dosing all possible side effects of the prescribed medications or medications that had been prescribed in the past were reviewed and all questions were answered  Patient verbalized agreement and understanding of the plan of care as outlined during the office visit today return to office as indicated or sooner if a problem arises          Problem List Items Addressed This Visit        Endocrine    Other specified hypothyroidism    Relevant Medications    levothyroxine 100 mcg tablet    levothyroxine 88 mcg tablet    Other Relevant Orders    CBC and differential    Comprehensive metabolic panel    Lipid panel    TSH, 3rd generation    UA w Reflex to Microscopic w Reflex to Culture       Other    Anxiety    Hyperlipidemia    Relevant Medications    levothyroxine 100 mcg tablet    levothyroxine 88 mcg tablet    Other Relevant Orders    CBC and differential    Comprehensive metabolic panel    Lipid panel    TSH, 3rd generation    UA w Reflex to Microscopic w Reflex to Culture      Other Visit Diagnoses     Medicare annual wellness visit, subsequent    -  Primary    Hypothyroidism, unspecified type        Relevant Medications    levothyroxine 100 mcg tablet    levothyroxine 88 mcg tablet    Essential hypertension        Relevant Medications    levothyroxine 100 mcg tablet    levothyroxine 88 mcg tablet    Other Relevant Orders    CBC and differential    Comprehensive metabolic panel    Lipid panel    TSH, 3rd generation    UA w Reflex to Microscopic w Reflex to Culture          Depression Screening and Follow-up Plan: Patient was screened for depression during today's encounter  They screened negative with a PHQ-2 score of 0  Preventive health issues were discussed with patient, and age appropriate screening tests were ordered as noted in patient's After Visit Summary  Personalized health advice and appropriate referrals for health education or preventive services given if needed, as noted in patient's After Visit Summary  History of Present Illness:     Patient presents for a Medicare Wellness Visit      Patient is here for routine follow-up  To review most recent labs  To also discuss current state of health and any new problems that they may be experiencing  Patient states that medications taken as prescribed and very well tolerated no new complaints at this time  Patient Care Team:  Argelia Bach as PCP - General (Nurse Practitioner)  Ruben Velarde MD     Review of Systems:     Review of Systems   Constitutional: Negative for appetite change and fever  HENT: Negative for sinus pressure and sore throat  Eyes: Negative for pain  Respiratory: Negative for shortness of breath  Cardiovascular: Negative for chest pain  Gastrointestinal: Negative for abdominal pain  Genitourinary: Negative for dysuria  Musculoskeletal: Negative for arthralgias and myalgias     Skin: Negative for color change  Neurological: Negative for light-headedness  Psychiatric/Behavioral: Negative for behavioral problems          Problem List:     Patient Active Problem List   Diagnosis    Anxiety    Dyspnea on effort    Murmur    Encounter for gynecological examination without abnormal finding    Osteopenia    Hyperlipidemia    Coronary artery disease involving native coronary artery of native heart without angina pectoris    Other specified hypothyroidism      Past Medical and Surgical History:     Past Medical History:   Diagnosis Date    Anxiety     Back pain     Disease of thyroid gland     Diverticulitis     Diverticulosis     Fibroid     GERD (gastroesophageal reflux disease)     Heart murmur     History of mammogram 11/01/2017    Hypertension     Hypothyroidism     Osteoporosis     Shortness of breath      Past Surgical History:   Procedure Laterality Date    APPENDECTOMY      COLONOSCOPY  2013    EYE SURGERY Right 07/05/2017    catarect surgery     EYE SURGERY Left 05/24/2017    catarect surgery     HEMORRHOID SURGERY      MAMMO (HISTORICAL)  11/01/2017    THYROID SURGERY      TOTAL ABDOMINAL HYSTERECTOMY      TUBAL LIGATION        Family History:     Family History   Problem Relation Age of Onset    Hypertension Father     Asthma Son     Arrhythmia Son       Social History:     Social History     Socioeconomic History    Marital status: /Civil Union     Spouse name: None    Number of children: None    Years of education: 15    Highest education level: None   Occupational History    None   Tobacco Use    Smoking status: Former Smoker     Years: 5 00     Types: Cigarettes    Smokeless tobacco: Never Used    Tobacco comment: as per [employer] Use    Vaping Use: Never used   Substance and Sexual Activity    Alcohol use: No     Comment: rarely    Drug use: No    Sexual activity: Not Currently     Birth control/protection: Surgical Comment: [facility]    Other Topics Concern    None   Social History Narrative    · Most recent tobacco use screenin2020     · Do you currently or have you served in the [employer]:   No      · Were you activated, into active duty, as a member of the [employer] or as a Reservist:   No      · Exercise level:   Occasional      · Diet:   Regular      · General stress level:   Medium      · Caffeine intake: Moderate      · Advance directive:    Yes      · Has the Patient had a mammogram to screen for breast cancer within 24 months:   Yes      · Please enter the date of the Patient's previous mammogram :   2017      · Date of last Colonoscopy:   2013     - As per Yesi Reyes     ·      Social Determinants of Health     Financial Resource Strain: Not on file   Food Insecurity: Not on file   Transportation Needs: Not on file   Physical Activity: Not on file   Stress: Not on file   Social Connections: Not on file   Intimate Partner Violence: Not on file   Housing Stability: Not on file      Medications and Allergies:     Current Outpatient Medications   Medication Sig Dispense Refill    ALPRAZolam (XANAX) 0 5 mg tablet Take 1 tablet (0 5 mg total) by mouth 2 (two) times a day as needed for anxiety 180 tablet 0    aspirin (ECOTRIN LOW STRENGTH) 81 mg EC tablet Take 1 tablet (81 mg total) by mouth daily 30 tablet 0    atorvastatin (LIPITOR) 20 mg tablet TAKE 1 TABLET BY MOUTH EVERY DAY 90 tablet 3    Calcium Carbonate-Vit D-Min (CALTRATE 600+D PLUS MINERALS) 600-800 MG-UNIT TABS Take by mouth daily        Cholecalciferol (VITAMIN D3) 1000 units CAPS Take by mouth daily        levothyroxine 100 mcg tablet Take 1 tablet (100 mcg total) by mouth daily Take 1 tab in a m  M, W, F 90 tablet 1    levothyroxine 88 mcg tablet Take 1 tab by mouth in the a m  tue, thur,sat sun 90 tablet 1    MAGNESIUM PO Take by mouth daily        metoprolol tartrate (LOPRESSOR) 25 mg tablet TAKE 1/2 TABLET BY MOUTH TWICE A DAY 90 tablet 3    famotidine (PEPCID) 20 mg tablet TAKE 2 TABLETS BY MOUTH EVERY DAY (Patient not taking: Reported on 7/25/2022) 180 tablet 1     No current facility-administered medications for this visit  No Known Allergies   Immunizations:     Immunization History   Administered Date(s) Administered    COVID-19 MODERNA VACC 0 5 ML IM 04/29/2021, 06/07/2021    INFLUENZA 12/05/2014, 10/31/2016, 10/09/2018    Influenza, high dose seasonal 0 7 mL 10/27/2021    Pneumococcal Conjugate 13-Valent 01/18/2017, 10/06/2017    Pneumococcal Polysaccharide PPV23 01/22/2020      Health Maintenance: There are no preventive care reminders to display for this patient  Topic Date Due    Influenza Vaccine (1) 09/01/2022      Medicare Screening Tests and Risk Assessments:     Alayna Roblero is here for her Subsequent Wellness visit  Last Medicare Wellness visit information reviewed, patient interviewed, no change since last AWV  Health Risk Assessment:   Patient rates overall health as good  Patient feels that their physical health rating is same  Patient is very satisfied with their life  Eyesight was rated as same  Hearing was rated as same  Patient feels that their emotional and mental health rating is same  Patients states they are never, rarely angry  Patient states they are never, rarely unusually tired/fatigued  Pain experienced in the last 7 days has been none  Patient states that she has experienced no weight loss or gain in last 6 months  Depression Screening:   PHQ-2 Score: 0      Fall Risk Screening: In the past year, patient has experienced: no history of falling in past year      Urinary Incontinence Screening:   Patient has not leaked urine accidently in the last six months  Home Safety:  Patient does not have trouble with stairs inside or outside of their home  Patient has working smoke alarms and has no working carbon monoxide detector  Home safety hazards include: none  Nutrition:   Current diet is Regular  Medications:   Patient is currently taking over-the-counter supplements  OTC medications include: see medication list  Patient is able to manage medications  Activities of Daily Living (ADLs)/Instrumental Activities of Daily Living (IADLs):   Walk and transfer into and out of bed and chair?: Yes  Dress and groom yourself?: Yes    Bathe or shower yourself?: Yes    Feed yourself? Yes  Do your laundry/housekeeping?: Yes  Manage your money, pay your bills and track your expenses?: Yes  Make your own meals?: Yes    Do your own shopping?: Yes    Previous Hospitalizations:   Any hospitalizations or ED visits within the last 12 months?: No      Advance Care Planning:   Living will: Yes    Durable POA for healthcare: Yes    Advanced directive: Yes    Advanced directive counseling given: No    Five wishes given: No    Patient declined ACP directive: No    End of Life Decisions reviewed with patient: Yes    Provider agrees with end of life decisions: Yes      Cognitive Screening:   Provider or family/friend/caregiver concerned regarding cognition?: No    PREVENTIVE SCREENINGS      Cardiovascular Screening:    General: Screening Not Indicated and History Lipid Disorder      Diabetes Screening:     General: Screening Current      Breast Cancer Screening:     General: Screening Current      Cervical Cancer Screening:    General: Screening Not Indicated      Osteoporosis Screening:    General: Screening Not Indicated and History Osteoporosis      Lung Cancer Screening:     General: Screening Not Indicated    Screening, Brief Intervention, and Referral to Treatment (SBIRT)    Screening  Typical number of drinks in a day: 0  Typical number of drinks in a week: 0  Interpretation: Low risk drinking behavior      Single Item Drug Screening:  How often have you used an illegal drug (including marijuana) or a prescription medication for non-medical reasons in the past year? never    Single Item Drug Screen Score: 0  Interpretation: Negative screen for possible drug use disorder    No exam data present     Physical Exam:     /62 (BP Location: Left arm, Patient Position: Sitting, Cuff Size: Standard)   Pulse 58   Temp 98 2 °F (36 8 °C) (Temporal)   Resp 16   Ht 5' 2" (1 575 m)   Wt 53 1 kg (117 lb)   LMP  (LMP Unknown)   SpO2 99%   BMI 21 40 kg/m²     Physical Exam  Vitals and nursing note reviewed  Constitutional:       Appearance: Normal appearance  She is normal weight  HENT:      Head: Normocephalic and atraumatic  Right Ear: Tympanic membrane, ear canal and external ear normal       Left Ear: Tympanic membrane, ear canal and external ear normal       Nose: Nose normal       Mouth/Throat:      Mouth: Mucous membranes are moist    Eyes:      Pupils: Pupils are equal, round, and reactive to light  Cardiovascular:      Rate and Rhythm: Normal rate and regular rhythm  Pulses: Normal pulses  Heart sounds: Normal heart sounds  Pulmonary:      Effort: Pulmonary effort is normal       Breath sounds: Normal breath sounds  Abdominal:      General: Abdomen is flat  Bowel sounds are normal       Palpations: Abdomen is soft  Musculoskeletal:         General: Normal range of motion  Cervical back: Normal range of motion  Skin:     General: Skin is warm  Neurological:      General: No focal deficit present  Mental Status: She is oriented to person, place, and time  Psychiatric:         Mood and Affect: Mood normal          Behavior: Behavior normal          Thought Content:  Thought content normal          Judgment: Judgment normal           DARVIN Masters

## 2022-09-24 DIAGNOSIS — E78.5 HYPERLIPIDEMIA, UNSPECIFIED HYPERLIPIDEMIA TYPE: ICD-10-CM

## 2022-09-26 RX ORDER — ATORVASTATIN CALCIUM 20 MG/1
TABLET, FILM COATED ORAL
Qty: 90 TABLET | Refills: 3 | Status: SHIPPED | OUTPATIENT
Start: 2022-09-26

## 2022-10-13 DIAGNOSIS — F41.9 ANXIETY: ICD-10-CM

## 2022-10-14 RX ORDER — ALPRAZOLAM 0.5 MG/1
0.5 TABLET ORAL 2 TIMES DAILY PRN
Qty: 180 TABLET | Refills: 0 | Status: SHIPPED | OUTPATIENT
Start: 2022-10-14

## 2022-11-30 ENCOUNTER — HOSPITAL ENCOUNTER (OUTPATIENT)
Dept: RADIOLOGY | Facility: MEDICAL CENTER | Age: 82
Discharge: HOME | End: 2022-11-30

## 2022-11-30 DIAGNOSIS — Z13.820 SCREENING FOR OSTEOPOROSIS: ICD-10-CM

## 2022-11-30 DIAGNOSIS — M81.0 POST-MENOPAUSAL OSTEOPOROSIS: ICD-10-CM

## 2022-12-06 NOTE — RESULT ENCOUNTER NOTE
Ran Benefits and spoke with patient about Prolia she will research it and let us know at her next visit if she would like to get the injection  I did inform her of the results of her Dexa scan

## 2022-12-27 DIAGNOSIS — E03.9 HYPOTHYROIDISM, UNSPECIFIED TYPE: ICD-10-CM

## 2022-12-27 DIAGNOSIS — I10 ESSENTIAL HYPERTENSION: ICD-10-CM

## 2022-12-27 DIAGNOSIS — E78.2 MIXED HYPERLIPIDEMIA: ICD-10-CM

## 2022-12-27 RX ORDER — LEVOTHYROXINE SODIUM 0.1 MG/1
TABLET ORAL
Qty: 36 TABLET | Refills: 1 | Status: SHIPPED | OUTPATIENT
Start: 2022-12-27

## 2023-01-30 ENCOUNTER — OFFICE VISIT (OUTPATIENT)
Dept: FAMILY MEDICINE CLINIC | Facility: CLINIC | Age: 83
End: 2023-01-30

## 2023-01-30 VITALS
BODY MASS INDEX: 23.37 KG/M2 | SYSTOLIC BLOOD PRESSURE: 134 MMHG | TEMPERATURE: 97.5 F | RESPIRATION RATE: 18 BRPM | HEIGHT: 62 IN | OXYGEN SATURATION: 99 % | HEART RATE: 60 BPM | DIASTOLIC BLOOD PRESSURE: 70 MMHG | WEIGHT: 127 LBS

## 2023-01-30 DIAGNOSIS — E03.8 OTHER SPECIFIED HYPOTHYROIDISM: Primary | ICD-10-CM

## 2023-01-30 DIAGNOSIS — I10 ESSENTIAL HYPERTENSION: ICD-10-CM

## 2023-01-30 DIAGNOSIS — I10 PRIMARY HYPERTENSION: ICD-10-CM

## 2023-01-30 DIAGNOSIS — F41.9 ANXIETY: ICD-10-CM

## 2023-01-30 DIAGNOSIS — E78.2 MIXED HYPERLIPIDEMIA: ICD-10-CM

## 2023-01-30 DIAGNOSIS — E03.9 HYPOTHYROIDISM, UNSPECIFIED TYPE: ICD-10-CM

## 2023-01-30 RX ORDER — LEVOTHYROXINE SODIUM 88 UG/1
TABLET ORAL
Qty: 90 TABLET | Refills: 1 | Status: SHIPPED | OUTPATIENT
Start: 2023-01-30

## 2023-01-30 RX ORDER — ALPRAZOLAM 0.5 MG/1
0.5 TABLET ORAL 2 TIMES DAILY PRN
Qty: 180 TABLET | Refills: 0 | Status: SHIPPED | OUTPATIENT
Start: 2023-01-30

## 2023-01-30 RX ORDER — LEVOTHYROXINE SODIUM 0.1 MG/1
TABLET ORAL
Qty: 90 TABLET | Refills: 1 | Status: SHIPPED | OUTPATIENT
Start: 2023-01-30

## 2023-01-30 NOTE — PROGRESS NOTES
Name: Soledad Phan      : 1940      MRN: [id number]  Encounter Provider: DARVIN Lara  Encounter Date: 2023   Encounter department: [address] [place] [address] Place     1  Other specified hypothyroidism    2  Primary hypertension    3  Mixed hyperlipidemia  -     levothyroxine 100 mcg tablet; `Take 1 tablet by mouth Mon, Wed and Fri  -     levothyroxine 88 mcg tablet; Take 1 tab by mouth in the  [facility] thur,sat sun    4  Anxiety  -     ALPRAZolam (XANAX) 0 5 mg tablet; Take 1 tablet (0 5 mg total) by mouth 2 (two) times a day as needed for anxiety    5  Hypothyroidism, unspecified type  -     levothyroxine 100 mcg tablet; `Take 1 tablet by mouth Mon, Wed and Fri    6  Essential hypertension  -     levothyroxine 100 mcg tablet; `Take 1 tablet by mouth Mon, Wed and Fri  -     levothyroxine 88 mcg tablet; Take 1 tab by mouth in the    tue, thur,[facility]    Physical assessment is unremarkable  Vital signs are well within normal limits  Labs are not available for review patient is advised to complete those at her earliest convenience  Her levothyroxine was refilled and its taken as prescribed well-tolerated the stratified dose is 100 MCG to be taken  88 MCG to be taken  and   We will make adjustments as needed  Alprazolam was also refilled at current dose to be taken as needed no potential for abuse   was checked  Otherwise would like to see patient back in 6 months all questions were answered she is very happy with the outcome of today's visit  Dosing all possible side effects of the prescribed medications or medications that had been prescribed in the past were reviewed and all questions were answered  Patient verbalized agreement and understanding of the plan of care as outlined during the office visit today return to office as indicated or sooner if a problem arises           Subjective Patient is here for routine follow-up  To review most recent labs  To also discuss current state of health and any new problems that they may be experiencing  Patient states that medications taken as prescribed and very well tolerated no new complaints at this time  Review of Systems   Constitutional: Negative for appetite change and fever  HENT: Negative for sinus pressure and sore throat  Eyes: Negative for pain  Respiratory: Negative for shortness of breath  Cardiovascular: Negative for chest pain  Gastrointestinal: Negative for abdominal pain  Genitourinary: Negative for dysuria  Musculoskeletal: Negative for arthralgias and myalgias  Skin: Negative for color change  Neurological: Negative for light-headedness  Psychiatric/Behavioral: Negative for behavioral problems         Past Medical History:   Diagnosis Date   • Anxiety    • Back pain    • Disease of thyroid gland    • Diverticulitis    • Diverticulosis    • Fibroid    • GERD (gastroesophageal reflux disease)    • Heart murmur    • History of mammogram 11/01/2017   • Hypertension    • Hypothyroidism    • Osteoporosis    • Shortness of breath      Past Surgical History:   Procedure Laterality Date   • APPENDECTOMY     • COLONOSCOPY  2013   • EYE SURGERY Right 07/05/2017    catarect surgery    • EYE SURGERY Left 05/24/2017    catarect surgery    • HEMORRHOID SURGERY     • MAMMO (HISTORICAL)  11/01/2017   • THYROID SURGERY     • TOTAL ABDOMINAL HYSTERECTOMY     • TUBAL LIGATION       Family History   Problem Relation Age of Onset   • Hypertension Father    • Asthma Son    • Arrhythmia Son      Social History     Socioeconomic History   • Marital status: /Civil Union     Spouse name: None   • Number of children: None   • Years of education: 12   • Highest education level: None   Occupational History   • None   Tobacco Use   • Smoking status: Former     Years: 5 00     Types: Cigarettes   • Smokeless tobacco: Never   • Tobacco comments:     as per [employer]   Vaping Use   • Vaping Use: Never used   Substance and Sexual Activity   • Alcohol use: No     Comment: rarely   • Drug use: No   • Sexual activity: Not Currently     Birth control/protection: Surgical     Comment: NAN-NISHANT    Other Topics Concern   • None   Social History Narrative    · Most recent tobacco use screenin2020     · Do you currently or have you served in the [employer]:   No      · Were you activated, into active duty, as a member of the [employer] or as a Reservist:   No      · Exercise level:   Occasional      · Diet:   Regular      · General stress level:   Medium      · Caffeine intake: Moderate      · Advance directive:    Yes      · Has the Patient had a mammogram to screen for breast cancer within 24 months:   Yes      · Please enter the date of the Patient's previous mammogram :   2017      · Date of last Colonoscopy:   2013     - As per [employer]     ·      Social Determinants of Health     Financial Resource Strain: Not on file   Food Insecurity: Not on file   Transportation Needs: Not on file   Physical Activity: Not on file   Stress: Not on file   Social Connections: Not on file   Intimate Partner Violence: Not on file   Housing Stability: Not on file     Current Outpatient Medications on File Prior to Visit   Medication Sig   • aspirin (ECOTRIN LOW STRENGTH) 81 mg EC tablet Take 1 tablet (81 mg total) by mouth daily   • atorvastatin (LIPITOR) 20 mg tablet TAKE 1 TABLET BY MOUTH EVERY DAY   • Calcium Carbonate-Vit D-Min (CALTRATE 600+D PLUS MINERALS) 600-800 MG-UNIT TABS Take by mouth daily     • Cholecalciferol (VITAMIN D3) 1000 units CAPS Take by mouth daily     • MAGNESIUM PO Take by mouth daily     • [DISCONTINUED] ALPRAZolam (XANAX) 0 5 mg tablet Take 1 tablet (0 5 mg total) by mouth 2 (two) times a day as needed for anxiety   • [DISCONTINUED] levothyroxine 100 mcg tablet TAKE 1 TABLET BY MOUTH IN THE AM MON, WED, AND FRI   • [DISCONTINUED] levothyroxine 88 mcg tablet Take 1 tab by mouth in the a m  tue, thur,sat sun   • famotidine (PEPCID) 20 mg tablet TAKE 2 TABLETS BY MOUTH EVERY DAY (Patient not taking: Reported on 7/25/2022)   • metoprolol tartrate (LOPRESSOR) 25 mg tablet TAKE 1/2 TABLET BY MOUTH TWICE A DAY (Patient not taking: Reported on 1/30/2023)     No Known Allergies  Immunization History   Administered Date(s) Administered   • COVID-19 MODERNA VACC 0 5 ML IM 04/29/2021, 06/07/2021   • INFLUENZA 12/05/2014, 10/31/2016, 10/09/2018   • Influenza, high dose seasonal 0 7 mL 10/27/2021   • Pneumococcal Conjugate 13-Valent 01/18/2017, 10/06/2017   • Pneumococcal Polysaccharide PPV23 01/22/2020       Objective     /70 (BP Location: Left arm, Patient Position: Sitting, Cuff Size: Standard)   Pulse 60   Temp 97 5 °F (36 4 °C) (Temporal)   Resp 18   Ht 5' 2" (1 575 m)   Wt 57 6 kg (127 lb)   LMP  (LMP Unknown)   SpO2 99%   BMI 23 23 kg/m²     Physical Exam  Vitals and nursing note reviewed  Constitutional:       General: She is not in acute distress  Appearance: She is well-developed  She is not diaphoretic  HENT:      Head: Normocephalic and atraumatic  Right Ear: External ear normal       Left Ear: External ear normal       Mouth/Throat:      Pharynx: Oropharynx is clear  Eyes:      Pupils: Pupils are equal, round, and reactive to light  Cardiovascular:      Rate and Rhythm: Normal rate and regular rhythm  Heart sounds: Normal heart sounds  Pulmonary:      Effort: Pulmonary effort is normal       Breath sounds: Normal breath sounds  Abdominal:      Palpations: Abdomen is soft  Musculoskeletal:      Cervical back: Normal range of motion and neck supple  Skin:     General: Skin is dry  Neurological:      General: No focal deficit present  Mental Status: She is alert and oriented to person, place, and time     Psychiatric:         Behavior: Behavior normal          Thought Content: Thought content normal        DARVIN Bland

## 2023-02-05 LAB
ALBUMIN SERPL-MCNC: 4.3 G/DL (ref 3.6–5.1)
ALBUMIN/GLOB SERPL: 1.5 (CALC) (ref 1–2.5)
ALP SERPL-CCNC: 101 U/L (ref 37–153)
ALT SERPL-CCNC: 16 U/L (ref 6–29)
APPEARANCE UR: CLEAR
AST SERPL-CCNC: 17 U/L (ref 10–35)
BACTERIA UR QL AUTO: ABNORMAL /HPF
BASOPHILS # BLD AUTO: 68 CELLS/UL (ref 0–200)
BASOPHILS NFR BLD AUTO: 0.9 %
BILIRUB SERPL-MCNC: 0.6 MG/DL (ref 0.2–1.2)
BILIRUB UR QL STRIP: NEGATIVE
BUN SERPL-MCNC: 16 MG/DL (ref 7–25)
BUN/CREAT SERPL: 16 (CALC) (ref 6–22)
CALCIUM SERPL-MCNC: 9.9 MG/DL (ref 8.6–10.4)
CHLORIDE SERPL-SCNC: 102 MMOL/L (ref 98–110)
CHOLEST SERPL-MCNC: 149 MG/DL
CHOLEST/HDLC SERPL: 2.2 (CALC)
CO2 SERPL-SCNC: 29 MMOL/L (ref 20–32)
COLOR UR: YELLOW
CREAT SERPL-MCNC: 0.98 MG/DL (ref 0.6–0.95)
EOSINOPHIL # BLD AUTO: 122 CELLS/UL (ref 15–500)
EOSINOPHIL NFR BLD AUTO: 1.6 %
ERYTHROCYTE [DISTWIDTH] IN BLOOD BY AUTOMATED COUNT: 11.8 % (ref 11–15)
GFR/BSA.PRED SERPLBLD CYS-BASED-ARV: 57 ML/MIN/1.73M2
GLOBULIN SER CALC-MCNC: 2.9 G/DL (CALC) (ref 1.9–3.7)
GLUCOSE SERPL-MCNC: 83 MG/DL (ref 65–99)
GLUCOSE UR QL STRIP: NEGATIVE
HCT VFR BLD AUTO: 40.6 % (ref 35–45)
HDLC SERPL-MCNC: 68 MG/DL
HGB BLD-MCNC: 13.8 G/DL (ref 11.7–15.5)
HGB UR QL STRIP: NEGATIVE
HYALINE CASTS #/AREA URNS LPF: ABNORMAL /LPF
KETONES UR QL STRIP: NEGATIVE
LDLC SERPL CALC-MCNC: 63 MG/DL (CALC)
LEUKOCYTE ESTERASE UR QL STRIP: ABNORMAL
LYMPHOCYTES # BLD AUTO: 2082 CELLS/UL (ref 850–3900)
LYMPHOCYTES NFR BLD AUTO: 27.4 %
MCH RBC QN AUTO: 30.7 PG (ref 27–33)
MCHC RBC AUTO-ENTMCNC: 34 G/DL (ref 32–36)
MCV RBC AUTO: 90.2 FL (ref 80–100)
MONOCYTES # BLD AUTO: 562 CELLS/UL (ref 200–950)
MONOCYTES NFR BLD AUTO: 7.4 %
NEUTROPHILS # BLD AUTO: 4765 CELLS/UL (ref 1500–7800)
NEUTROPHILS NFR BLD AUTO: 62.7 %
NITRITE UR QL STRIP: NEGATIVE
NONHDLC SERPL-MCNC: 81 MG/DL (CALC)
PH UR STRIP: 6.5 [PH] (ref 5–8)
PLATELET # BLD AUTO: 381 THOUSAND/UL (ref 140–400)
PMV BLD REES-ECKER: 9.7 FL (ref 7.5–12.5)
POTASSIUM SERPL-SCNC: 4.1 MMOL/L (ref 3.5–5.3)
PROT SERPL-MCNC: 7.2 G/DL (ref 6.1–8.1)
PROT UR QL STRIP: NEGATIVE
RBC # BLD AUTO: 4.5 MILLION/UL (ref 3.8–5.1)
RBC #/AREA URNS HPF: ABNORMAL /HPF
SODIUM SERPL-SCNC: 139 MMOL/L (ref 135–146)
SP GR UR STRIP: 1.01 (ref 1–1.03)
SQUAMOUS #/AREA URNS HPF: ABNORMAL /HPF
TRIGL SERPL-MCNC: 101 MG/DL
TSH SERPL-ACNC: 1 MIU/L (ref 0.4–4.5)
WBC # BLD AUTO: 7.6 THOUSAND/UL (ref 3.8–10.8)
WBC #/AREA URNS HPF: ABNORMAL /HPF

## 2023-04-24 NOTE — PROGRESS NOTES
COVID-19 Virtual Visit     Assessment/Plan:    Problem List Items Addressed This Visit     None         7 minutes spent on this call     Encounter provider DARVIN Schimdt    Provider located at [address] [facility] [address] [phone]    Recent Visits  No visits were found meeting these conditions  Showing recent visits within past 7 days and meeting all other requirements     Future Appointments  No visits were found meeting these conditions  Showing future appointments within next 150 days and meeting all other requirements        Patient agrees to participate in a virtual check in via telephone or video visit instead of presenting to the office to address urgent/immediate medical needs  Patient is aware this is a billable service  After connecting through Telephone, the patient was identified by name and date of birth  Radha Carrillo was informed that this was a telemedicine visit and that the exam was being conducted confidentially over secure lines  My office door was closed  No one else was in the room  Radha Carrillo acknowledged consent and understanding of privacy and security of the telemedicine visit  I informed the patient that I have reviewed her record in Epic and presented the opportunity for her to ask any questions regarding the visit today  The patient agreed to participate  Subjective:   Radha Carrillo is a 79 y o  female who is concerned about COVID-19  Patient's symptoms include sore throat       Exposure:   Contact with a person who is under investigation (PUI) for or who is positive for COVID-19 within the last 14 days?: No    Hospitalized recently for fever and/or lower respiratory symptoms?: No      Currently a healthcare worker that is involved in direct patient care?: No      Works in a special setting where the risk of COVID-19 transmission may be high? (this may include long-term care, correctional and longterm facilities; homeless shelters; assisted-living facilities and group homes ): No      Resident in a special setting where the risk of COVID-19 transmission may be high? (this may include long-term care, correctional and longterm facilities; homeless shelters; assisted-living facilities and group homes ): No      No results found for: Ana Garcia, CARISA, Leonardo Hendricks [address]  Past Medical History:   Diagnosis Date    Anxiety     Back pain     Disease of thyroid gland     Diverticulitis     Diverticulosis     Fibroid     GERD (gastroesophageal reflux disease)     Heart murmur     History of mammogram 11/01/2017    Hypertension     Hypothyroidism     Osteoporosis     Shortness of breath      Past Surgical History:   Procedure Laterality Date    APPENDECTOMY      COLONOSCOPY  2013    EYE SURGERY Right 07/05/2017    catarect surgery     EYE SURGERY Left 05/24/2017    catarect surgery     HEMORRHOID SURGERY      MAMMO (HISTORICAL)  11/01/2017    THYROID SURGERY      TOTAL ABDOMINAL HYSTERECTOMY      TUBAL LIGATION       Current Outpatient Medications   Medication Sig Dispense Refill    ALPRAZolam (Xanax) 0 5 mg tablet Take 1 tablet (0 5 mg total) by mouth 2 (two) times a day as needed for anxiety 60 tablet 5    aspirin (ECOTRIN LOW STRENGTH) 81 mg EC tablet Take 1 tablet (81 mg total) by mouth daily 30 tablet 0    atorvastatin (LIPITOR) 20 mg tablet Take 1 tablet (20 mg total) by mouth daily 90 tablet 3    Calcium Carbonate-Vit D-Min (CALTRATE 600+D PLUS MINERALS) 600-800 MG-UNIT TABS Take by mouth daily        Cholecalciferol (VITAMIN D3) 1000 units CAPS Take by mouth daily        famotidine (PEPCID) 40 MG tablet Take 1 tablet (40 mg total) by mouth daily 90 tablet 1    levothyroxine 100 mcg tablet TAKE 1 TABLET BY MOUTH EVERY DAY 90 tablet 1    MAGNESIUM PO Take by mouth daily        metoprolol tartrate (LOPRESSOR) 25 mg tablet TAKE 1/2 TAB TWICE A DAY 90 tablet 3  nizatidine (AXID) 150 MG capsule TAKE 1 CAPSULE BY MOUTH TWICE A  capsule 1     No current facility-administered medications for this visit  No Known Allergies    Review of Systems   HENT: Positive for sore throat  Objective: There were no vitals filed for this visit  Physical Exam  VIRTUAL VISIT DISCLAIMER    Tania Galeana acknowledges that she has consented to an online visit or consultation  She understands that the online visit is based solely on information provided by her, and that, in the absence of a face-to-face physical evaluation by the physician, the diagnosis she receives is both limited and provisional in terms of accuracy and completeness  This is not intended to replace a full medical face-to-face evaluation by the physician  Tania Galeana understands and accepts these terms  Detail Level: Detailed Detail Level: Zone Detail Level: Simple

## 2023-06-06 NOTE — PROGRESS NOTES
Name: Rachel Merida      : 1940      MRN: [id number]  Encounter Provider: Rashida Traore MD  Encounter Date: 2023   Encounter department: [address] [place] [address] Place     1  Anxiety  Assessment & Plan:  Patient to continue utilizing medical therapy as well as counseling sources as applicable to condition  If suicidal thought or fear of suicide attempt, to call 911 and seek help immediately  Medications and therapy reviewed today and all patient  questions answered today  Orders:  -     ALPRAZolam (XANAX) 0 5 mg tablet; Take 1 tablet (0 5 mg total) by mouth 2 (two) times a day as needed for anxiety    2  Mixed hyperlipidemia  Assessment & Plan:  Patient  is stable with current medication and we discussed a low fat low cholesterol diet  Weight loss also discussed for this will help lower cholesterol also  Recheck lipids in 6 months  Orders:  -     Lipid Panel with Direct LDL reflex; Future    3  Coronary artery disease involving native coronary artery of native heart without angina pectoris  Assessment & Plan:  Patient to continue  with current cardiac meds to decrease risk of re stenosis  Patient to follow up with cardiology for scheduled appointments to decrease risk for further cardiac problems with appropriate diagnostic testing to reassess cardiac status  Patient had alll questions about this problem answered today  4  Other specified hypothyroidism  Assessment & Plan:  Patient to continue current dose of thyroid medicine and recheck TSH in 6 months    Orders:  -     TSH, 3rd generation with Free T4 reflex; Future    5  Dyspnea on effort  -     metoprolol tartrate (LOPRESSOR) 25 mg tablet; Take 0 5 tablets (12 5 mg total) by mouth 2 (two) times a day    6  Abnormal stress test  -     metoprolol tartrate (LOPRESSOR) 25 mg tablet; Take 0 5 tablets (12 5 mg total) by mouth 2 (two) times a day    7   Other fatigue  -     Comprehensive metabolic panel; Future  -     CBC and differential; Future  -     Magnesium; Future  -     Uric acid; Future  -     Urinalysis with microscopic        Falls Plan of Care: balance, strength, and gait training instructions were provided  Home safety education provided  Urinary Incontinence Plan of Care: counseling topics discussed: practice Kegel (pelvic floor strengthening) exercises, use restroom every 2 hours, limit alcohol, caffeine, spicy foods, and acidic foods, limiting fluid intake 3-4 hours before bed, weight loss, preventing constipation and limiting fluid intake to 60 oz  per day           Subjective     HPI  Review of Systems    Past Medical History:   Diagnosis Date   • Anxiety    • Back pain    • Disease of thyroid gland    • Diverticulitis    • Diverticulosis    • Fibroid    • GERD (gastroesophageal reflux disease)    • Heart murmur    • History of mammogram 11/01/2017   • Hypertension    • Hypothyroidism    • Osteoporosis    • Shortness of breath      Past Surgical History:   Procedure Laterality Date   • APPENDECTOMY     • COLONOSCOPY  2013   • EYE SURGERY Right 07/05/2017    catarect surgery    • EYE SURGERY Left 05/24/2017    catarect surgery    • HEMORRHOID SURGERY     • MAMMO (HISTORICAL)  11/01/2017   • THYROID SURGERY     • TOTAL ABDOMINAL HYSTERECTOMY     • TUBAL LIGATION       Family History   Problem Relation Age of Onset   • Hypertension Father    • Asthma Son    • Arrhythmia Son      Social History     Socioeconomic History   • Marital status: /Civil Union     Spouse name: None   • Number of children: None   • Years of education: 12   • Highest education level: None   Occupational History   • None   Tobacco Use   • Smoking status: Former     Years: 5 00     Types: Cigarettes   • Smokeless tobacco: Never   • Tobacco comments:     as per [employer] Use   • Vaping Use: Never used   Substance and Sexual Activity   • Alcohol use: No     Comment: rarely   • Drug use: No   • Sexual activity: Not Currently     Birth control/protection: Surgical     Comment: NAN-BSBENITO    Other Topics Concern   • None   Social History Narrative    · Most recent tobacco use screenin2020     · Do you currently or have you served in the [employer]:   No      · Were you activated, into active duty, as a member of the [employer] or as a Reservist:   No      · Exercise level:   Occasional      · Diet:   Regular      · General stress level:   Medium      · Caffeine intake: Moderate      · Advance directive:    Yes      · Has the Patient had a mammogram to screen for breast cancer within 24 months:   Yes      · Please enter the date of the Patient's previous mammogram :   2017      · Date of last Colonoscopy:   2013     - As per Abby Gallegos     ·      Social Determinants of Health     Financial Resource Strain: Not on file   Food Insecurity: Not on file   Transportation Needs: Not on file   Physical Activity: Not on file   Stress: Not on file   Social Connections: Not on file   Intimate Partner Violence: Not on file   Housing Stability: Not on file     Current Outpatient Medications on File Prior to Visit   Medication Sig   • aspirin (ECOTRIN LOW STRENGTH) 81 mg EC tablet Take 1 tablet (81 mg total) by mouth daily   • atorvastatin (LIPITOR) 20 mg tablet TAKE 1 TABLET BY MOUTH EVERY DAY   • Calcium Carbonate-Vit D-Min (CALTRATE 600+D PLUS MINERALS) 600-800 MG-UNIT TABS Take by mouth daily     • Cholecalciferol (VITAMIN D3) 1000 units CAPS Take by mouth daily     • levothyroxine 100 mcg tablet `Take 1 tablet by mouth Mon, Wed and Fri   • levothyroxine 88 mcg tablet Take 1 tab by mouth in the a m  tue, thur,sat sun   • MAGNESIUM PO Take by mouth daily     • [DISCONTINUED] ALPRAZolam (XANAX) 0 5 mg tablet Take 1 tablet (0 5 mg total) by mouth 2 (two) times a day as needed for anxiety   • famotidine (PEPCID) 20 mg tablet TAKE 2 TABLETS BY MOUTH EVERY DAY (Patient not taking: Reported on 2022)   • "[DISCONTINUED] metoprolol tartrate (LOPRESSOR) 25 mg tablet TAKE 1/2 TABLET BY MOUTH TWICE A DAY (Patient not taking: Reported on 1/30/2023)     No Known Allergies  Immunization History   Administered Date(s) Administered   • COVID-19 MODERNA VACC 0 5 ML IM 04/29/2021, 06/07/2021   • INFLUENZA 12/05/2014, 10/31/2016, 10/09/2018   • Influenza, high dose seasonal 0 7 mL 10/27/2021   • Pneumococcal Conjugate 13-Valent 01/18/2017, 10/06/2017   • Pneumococcal Polysaccharide PPV23 01/22/2020       Objective     /70 (BP Location: Left arm, Patient Position: Sitting, Cuff Size: Standard)   Pulse 60   Temp 98 5 °F (36 9 °C)   Ht 5' 2\" (1 575 m)   Wt 62 1 kg (137 lb)   LMP  (LMP Unknown)   SpO2 99%   BMI 25 06 kg/m²     Physical Exam  Vitals and nursing note reviewed  Constitutional:       Appearance: She is well-developed  HENT:      Head: Normocephalic and atraumatic  Nose: Nose normal       Mouth/Throat:      Mouth: Mucous membranes are moist    Eyes:      General: No scleral icterus  Conjunctiva/sclera: Conjunctivae normal       Pupils: Pupils are equal, round, and reactive to light  Neck:      Thyroid: No thyromegaly  Cardiovascular:      Rate and Rhythm: Normal rate and regular rhythm  Pulmonary:      Effort: Pulmonary effort is normal       Breath sounds: Normal breath sounds  No wheezing  Abdominal:      General: Bowel sounds are normal  There is no distension  Palpations: Abdomen is soft  Tenderness: There is no abdominal tenderness  There is no guarding or rebound  Musculoskeletal:         General: No tenderness or deformity  Normal range of motion  Cervical back: Normal range of motion and neck supple  Skin:     General: Skin is warm and dry  Findings: No erythema or rash  Neurological:      Mental Status: She is alert and oriented to person, place, and time  Sensory: No sensory deficit     Psychiatric:         Behavior: Behavior normal          " Thought Content:  Thought content normal          Judgment: Judgment normal        Chantel Key MD

## 2023-06-07 ENCOUNTER — OFFICE VISIT (OUTPATIENT)
Dept: FAMILY MEDICINE CLINIC | Facility: CLINIC | Age: 83
End: 2023-06-07
Payer: MEDICARE

## 2023-06-07 VITALS
SYSTOLIC BLOOD PRESSURE: 140 MMHG | WEIGHT: 137 LBS | TEMPERATURE: 98.5 F | HEART RATE: 60 BPM | OXYGEN SATURATION: 99 % | HEIGHT: 62 IN | BODY MASS INDEX: 25.21 KG/M2 | DIASTOLIC BLOOD PRESSURE: 70 MMHG

## 2023-06-07 DIAGNOSIS — R53.83 OTHER FATIGUE: ICD-10-CM

## 2023-06-07 DIAGNOSIS — E03.8 OTHER SPECIFIED HYPOTHYROIDISM: ICD-10-CM

## 2023-06-07 DIAGNOSIS — I25.10 CORONARY ARTERY DISEASE INVOLVING NATIVE CORONARY ARTERY OF NATIVE HEART WITHOUT ANGINA PECTORIS: ICD-10-CM

## 2023-06-07 DIAGNOSIS — R06.09 DYSPNEA ON EFFORT: ICD-10-CM

## 2023-06-07 DIAGNOSIS — F41.9 ANXIETY: Primary | ICD-10-CM

## 2023-06-07 DIAGNOSIS — E78.2 MIXED HYPERLIPIDEMIA: ICD-10-CM

## 2023-06-07 DIAGNOSIS — R94.39 ABNORMAL STRESS TEST: ICD-10-CM

## 2023-06-07 PROCEDURE — 99214 OFFICE O/P EST MOD 30 MIN: CPT | Performed by: FAMILY MEDICINE

## 2023-06-07 RX ORDER — ALPRAZOLAM 0.5 MG/1
0.5 TABLET ORAL 2 TIMES DAILY PRN
Qty: 180 TABLET | Refills: 0 | Status: SHIPPED | OUTPATIENT
Start: 2023-06-07

## 2023-06-27 LAB
ALBUMIN SERPL-MCNC: 4.3 G/DL (ref 3.6–5.1)
ALBUMIN/GLOB SERPL: 1.5 (CALC) (ref 1–2.5)
ALP SERPL-CCNC: 118 U/L (ref 37–153)
ALT SERPL-CCNC: 16 U/L (ref 6–29)
AST SERPL-CCNC: 17 U/L (ref 10–35)
BASOPHILS # BLD AUTO: 80 CELLS/UL (ref 0–200)
BASOPHILS NFR BLD AUTO: 1.1 %
BILIRUB SERPL-MCNC: 0.8 MG/DL (ref 0.2–1.2)
BUN SERPL-MCNC: 17 MG/DL (ref 7–25)
BUN/CREAT SERPL: 15 (CALC) (ref 6–22)
CALCIUM SERPL-MCNC: 9.7 MG/DL (ref 8.6–10.4)
CHLORIDE SERPL-SCNC: 102 MMOL/L (ref 98–110)
CHOLEST SERPL-MCNC: 148 MG/DL
CHOLEST/HDLC SERPL: 2.4 (CALC)
CO2 SERPL-SCNC: 27 MMOL/L (ref 20–32)
CREAT SERPL-MCNC: 1.12 MG/DL (ref 0.6–0.95)
EOSINOPHIL # BLD AUTO: 131 CELLS/UL (ref 15–500)
EOSINOPHIL NFR BLD AUTO: 1.8 %
ERYTHROCYTE [DISTWIDTH] IN BLOOD BY AUTOMATED COUNT: 11.8 % (ref 11–15)
GFR/BSA.PRED SERPLBLD CYS-BASED-ARV: 49 ML/MIN/1.73M2
GLOBULIN SER CALC-MCNC: 2.8 G/DL (CALC) (ref 1.9–3.7)
GLUCOSE SERPL-MCNC: 86 MG/DL (ref 65–99)
HCT VFR BLD AUTO: 42.1 % (ref 35–45)
HDLC SERPL-MCNC: 62 MG/DL
HGB BLD-MCNC: 14 G/DL (ref 11.7–15.5)
LDLC SERPL CALC-MCNC: 70 MG/DL (CALC)
LYMPHOCYTES # BLD AUTO: 1840 CELLS/UL (ref 850–3900)
LYMPHOCYTES NFR BLD AUTO: 25.2 %
MAGNESIUM SERPL-MCNC: 2.6 MG/DL (ref 1.5–2.5)
MCH RBC QN AUTO: 29.9 PG (ref 27–33)
MCHC RBC AUTO-ENTMCNC: 33.3 G/DL (ref 32–36)
MCV RBC AUTO: 90 FL (ref 80–100)
MONOCYTES # BLD AUTO: 548 CELLS/UL (ref 200–950)
MONOCYTES NFR BLD AUTO: 7.5 %
NEUTROPHILS # BLD AUTO: 4701 CELLS/UL (ref 1500–7800)
NEUTROPHILS NFR BLD AUTO: 64.4 %
NONHDLC SERPL-MCNC: 86 MG/DL (CALC)
PLATELET # BLD AUTO: 373 THOUSAND/UL (ref 140–400)
PMV BLD REES-ECKER: 9.8 FL (ref 7.5–12.5)
POTASSIUM SERPL-SCNC: 4.9 MMOL/L (ref 3.5–5.3)
PROT SERPL-MCNC: 7.1 G/DL (ref 6.1–8.1)
RBC # BLD AUTO: 4.68 MILLION/UL (ref 3.8–5.1)
SODIUM SERPL-SCNC: 137 MMOL/L (ref 135–146)
TRIGL SERPL-MCNC: 84 MG/DL
TSH SERPL-ACNC: 2.89 MIU/L (ref 0.4–4.5)
URATE SERPL-MCNC: 4 MG/DL (ref 2.5–7)
WBC # BLD AUTO: 7.3 THOUSAND/UL (ref 3.8–10.8)

## 2023-07-20 ENCOUNTER — HOSPITAL ENCOUNTER (EMERGENCY)
Facility: HOSPITAL | Age: 83
Discharge: HOME | End: 2023-07-20
Attending: EMERGENCY MEDICINE
Payer: COMMERCIAL

## 2023-07-20 ENCOUNTER — APPOINTMENT (EMERGENCY)
Dept: RADIOLOGY | Facility: HOSPITAL | Age: 83
End: 2023-07-20
Payer: COMMERCIAL

## 2023-07-20 VITALS
OXYGEN SATURATION: 98 % | TEMPERATURE: 98.3 F | RESPIRATION RATE: 16 BRPM | SYSTOLIC BLOOD PRESSURE: 177 MMHG | HEART RATE: 65 BPM | DIASTOLIC BLOOD PRESSURE: 80 MMHG

## 2023-07-20 DIAGNOSIS — S80.11XA TRAUMATIC HEMATOMA OF RIGHT LOWER LEG, INITIAL ENCOUNTER: Primary | ICD-10-CM

## 2023-07-20 PROCEDURE — 73590 X-RAY EXAM OF LOWER LEG: CPT

## 2023-07-20 PROCEDURE — 99284 EMERGENCY DEPT VISIT MOD MDM: CPT

## 2023-07-20 PROCEDURE — 99284 EMERGENCY DEPT VISIT MOD MDM: CPT | Performed by: EMERGENCY MEDICINE

## 2023-07-20 RX ORDER — ACETAMINOPHEN 325 MG/1
975 TABLET ORAL ONCE
Status: COMPLETED | OUTPATIENT
Start: 2023-07-20 | End: 2023-07-20

## 2023-07-20 RX ADMIN — ACETAMINOPHEN 975 MG: 325 TABLET, FILM COATED ORAL at 16:32
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
## 2023-09-18 DIAGNOSIS — E78.5 HYPERLIPIDEMIA, UNSPECIFIED HYPERLIPIDEMIA TYPE: ICD-10-CM

## 2023-09-18 RX ORDER — ATORVASTATIN CALCIUM 20 MG/1
TABLET, FILM COATED ORAL
Qty: 90 TABLET | Refills: 3 | Status: SHIPPED | OUTPATIENT
Start: 2023-09-18

## 2023-10-07 DIAGNOSIS — R94.39 ABNORMAL STRESS TEST: ICD-10-CM

## 2023-10-07 DIAGNOSIS — R06.09 DYSPNEA ON EFFORT: ICD-10-CM

## 2023-10-30 DIAGNOSIS — E78.2 MIXED HYPERLIPIDEMIA: ICD-10-CM

## 2023-10-30 DIAGNOSIS — I10 ESSENTIAL HYPERTENSION: ICD-10-CM

## 2023-10-30 RX ORDER — LEVOTHYROXINE SODIUM 88 UG/1
TABLET ORAL
Qty: 48 TABLET | Refills: 3 | Status: SHIPPED | OUTPATIENT
Start: 2023-10-30

## 2023-12-06 ENCOUNTER — RA CDI HCC (OUTPATIENT)
Dept: OTHER | Facility: HOSPITAL | Age: 83
End: 2023-12-06

## 2023-12-06 NOTE — PROGRESS NOTES
[address] [facility] coding opportunities       Chart reviewed, no opportunity found: CHART REVIEWED, NO OPPORTUNITY FOUND        Patients Insurance     Medicare Insurance: Medicare     Commercial Insurance: [address]

## 2023-12-13 ENCOUNTER — OFFICE VISIT (OUTPATIENT)
Dept: FAMILY MEDICINE CLINIC | Facility: CLINIC | Age: 83
End: 2023-12-13
Payer: MEDICARE

## 2023-12-13 VITALS
HEART RATE: 68 BPM | BODY MASS INDEX: 28.55 KG/M2 | TEMPERATURE: 97.7 F | OXYGEN SATURATION: 99 % | WEIGHT: 136 LBS | SYSTOLIC BLOOD PRESSURE: 138 MMHG | HEIGHT: 58 IN | DIASTOLIC BLOOD PRESSURE: 70 MMHG

## 2023-12-13 DIAGNOSIS — R41.3 MEMORY CHANGE: ICD-10-CM

## 2023-12-13 DIAGNOSIS — E78.2 MIXED HYPERLIPIDEMIA: ICD-10-CM

## 2023-12-13 DIAGNOSIS — E03.8 OTHER SPECIFIED HYPOTHYROIDISM: ICD-10-CM

## 2023-12-13 DIAGNOSIS — Z00.00 WELL ADULT EXAM: Primary | ICD-10-CM

## 2023-12-13 DIAGNOSIS — N18.31 STAGE 3A CHRONIC KIDNEY DISEASE (HCC): ICD-10-CM

## 2023-12-13 DIAGNOSIS — F41.9 ANXIETY: ICD-10-CM

## 2023-12-13 DIAGNOSIS — I25.10 CORONARY ARTERY DISEASE INVOLVING NATIVE CORONARY ARTERY OF NATIVE HEART WITHOUT ANGINA PECTORIS: ICD-10-CM

## 2023-12-13 PROCEDURE — G0439 PPPS, SUBSEQ VISIT: HCPCS | Performed by: FAMILY MEDICINE

## 2023-12-13 PROCEDURE — 99214 OFFICE O/P EST MOD 30 MIN: CPT | Performed by: FAMILY MEDICINE

## 2023-12-13 RX ORDER — ALPRAZOLAM 0.5 MG/1
0.5 TABLET ORAL 2 TIMES DAILY PRN
Qty: 180 TABLET | Refills: 0 | Status: SHIPPED | OUTPATIENT
Start: 2023-12-13

## 2023-12-13 RX ORDER — ANTIOX #8/OM3/DHA/EPA/LUT/ZEAX 250-2.5 MG
CAPSULE ORAL
COMMUNITY

## 2023-12-13 NOTE — PATIENT INSTRUCTIONS
Medicare Preventive Visit Patient Instructions  Thank you for completing your Welcome to Medicare Visit or Medicare Annual Wellness Visit today. Your next wellness visit will be due in one year (12/13/2024). The screening/preventive services that you may require over the next 5-10 years are detailed below. Some tests may not apply to you based off risk factors and/or age. Screening tests ordered at today's visit but not completed yet may show as past due. Also, please note that scanned in results may not display below. Preventive Screenings:  Service Recommendations Previous Testing/Comments   Colorectal Cancer Screening  * Colonoscopy    * Fecal Occult Blood Test (FOBT)/Fecal Immunochemical Test (FIT)  * Fecal DNA/Cologuard Test  * Flexible Sigmoidoscopy Age: 43-73 years old   Colonoscopy: every 10 years (may be performed more frequently if at higher risk)  OR  FOBT/FIT: every 1 year  OR  Cologuard: every 3 years  OR  Sigmoidoscopy: every 5 years  Screening may be recommended earlier than age 39 if at higher risk for colorectal cancer. Also, an individualized decision between you and your healthcare provider will decide whether screening between the ages of 77-80 would be appropriate. Colonoscopy: Not on file  FOBT/FIT: Not on file  Cologuard: Not on file  Sigmoidoscopy: Not on file          Breast Cancer Screening Age: 36 years old  Frequency: every 1-2 years  Not required if history of left and right mastectomy Mammogram: 11/19/2020        Cervical Cancer Screening Between the ages of 21-29, pap smear recommended once every 3 years. Between the ages of 32-69, can perform pap smear with HPV co-testing every 5 years.    Recommendations may differ for women with a history of total hysterectomy, cervical cancer, or abnormal pap smears in past. Pap Smear: 12/10/2018    Screening Not Indicated   Hepatitis C Screening Once for adults born between 1945 and 1965  More frequently in patients at high risk for Hepatitis C Hep C Antibody: Not on file        Diabetes Screening 1-2 times per year if you're at risk for diabetes or have pre-diabetes Fasting glucose: 90 mg/dL (2/24/2021)  A1C: No results in last 5 years (No results in last 5 years)  Screening Current   Cholesterol Screening Once every 5 years if you don't have a lipid disorder. May order more often based on risk factors. Lipid panel: 06/26/2023    Screening Not Indicated  History Lipid Disorder     Other Preventive Screenings Covered by Medicare:  Abdominal Aortic Aneurysm (AAA) Screening: covered once if your at risk. You're considered to be at risk if you have a family history of AAA. Lung Cancer Screening: covers low dose CT scan once per year if you meet all of the following conditions: (1) Age 48-67; (2) No signs or symptoms of lung cancer; (3) Current smoker or have quit smoking within the last 15 years; (4) You have a tobacco smoking history of at least 20 pack years (packs per day multiplied by number of years you smoked); (5) You get a written order from a healthcare provider. Glaucoma Screening: covered annually if you're considered high risk: (1) You have diabetes OR (2) Family history of glaucoma OR (3)  aged 48 and older OR (3)  American aged 72 and older  Osteoporosis Screening: covered every 2 years if you meet one of the following conditions: (1) You're estrogen deficient and at risk for osteoporosis based off medical history and other findings; (2) Have a vertebral abnormality; (3) On glucocorticoid therapy for more than 3 months; (4) Have primary hyperparathyroidism; (5) On osteoporosis medications and need to assess response to drug therapy. Last bone density test (DXA Scan): 11/30/2022. HIV Screening: covered annually if you're between the age of 14-79. Also covered annually if you are younger than 13 and older than 72 with risk factors for HIV infection.  For pregnant patients, it is covered up to 3 times per pregnancy. Immunizations:  Immunization Recommendations   Influenza Vaccine Annual influenza vaccination during flu season is recommended for all persons aged >= 6 months who do not have contraindications   Pneumococcal Vaccine   * Pneumococcal conjugate vaccine = PCV13 (Prevnar 13), PCV15 (Vaxneuvance), PCV20 (Prevnar 20)  * Pneumococcal polysaccharide vaccine = PPSV23 (Pneumovax) Adults 10-78 yo with certain risk factors or if 69+ yo  If never received any pneumonia vaccine: recommend Prevnar 20 (PCV20)  Give PCV20 if previously received 1 dose of PCV13 or PPSV23   Hepatitis B Vaccine 3 dose series if at intermediate or high risk (ex: diabetes, end stage renal disease, liver disease)   Respiratory syncytial virus (RSV) Vaccine - COVERED BY MEDICARE PART D  * RSVPreF3 (Arexvy) CDC recommends that adults 61years of age and older may receive a single dose of RSV vaccine using shared clinical decision-making (SCDM)   Tetanus (Td) Vaccine - COST NOT COVERED BY MEDICARE PART B Following completion of primary series, a booster dose should be given every 10 years to maintain immunity against tetanus. Td may also be given as tetanus wound prophylaxis. Tdap Vaccine - COST NOT COVERED BY MEDICARE PART B Recommended at least once for all adults. For pregnant patients, recommended with each pregnancy. Shingles Vaccine (Shingrix) - COST NOT COVERED BY MEDICARE PART B  2 shot series recommended in those 19 years and older who have or will have weakened immune systems or those 50 years and older     Health Maintenance Due:  There are no preventive care reminders to display for this patient. Immunizations Due:      Topic Date Due   • Influenza Vaccine (1) 09/01/2023   • COVID-19 Vaccine (3 - 2023-24 season) 09/01/2023     Advance Directives   What are advance directives? Advance directives are legal documents that state your wishes and plans for medical care.  These plans are made ahead of time in case you lose your ability to make decisions for yourself. Advance directives can apply to any medical decision, such as the treatments you want, and if you want to donate organs. What are the types of advance directives? There are many types of advance directives, and each state has rules about how to use them. You may choose a combination of any of the following:  Living will: This is a written record of the treatment you want. You can also choose which treatments you do not want, which to limit, and which to stop at a certain time. This includes surgery, medicine, IV fluid, and tube feedings. Durable power of  for [facility]): This is a written record that states who you want to make healthcare choices for you when you are unable to make them for yourself. This person, called a proxy, is usually a family member or a friend. You may choose more than 1 proxy. Do not resuscitate (DNR) order:  A DNR order is used in case your heart stops beating or you stop breathing. It is a request not to have certain forms of treatment, such as CPR. A DNR order may be included in other types of advance directives. Medical directive: This covers the care that you want if you are in a coma, near death, or unable to make decisions for yourself. You can list the treatments you want for each condition. Treatment may include pain medicine, surgery, blood transfusions, dialysis, IV or tube feedings, and a ventilator (breathing machine). Values history: This document has questions about your views, beliefs, and how you feel and think about life. This information can help others choose the care that you would choose. Why are advance directives important? An advance directive helps you control your care. Although spoken wishes may be used, it is better to have your wishes written down. Spoken wishes can be misunderstood, or not followed. Treatments may be given even if you do not want them.  An advance directive may make it easier for your family to make difficult choices about your care. Weight Management   Why it is important to manage your weight:  Being overweight increases your risk of health conditions such as heart disease, high blood pressure, type 2 diabetes, and certain types of cancer. It can also increase your risk for osteoarthritis, sleep apnea, and other respiratory problems. Aim for a slow, steady weight loss. Even a small amount of weight loss can lower your risk of health problems. How to lose weight safely:  A safe and healthy way to lose weight is to eat fewer calories and get regular exercise. You can lose up about 1 pound a week by decreasing the number of calories you eat by 500 calories each day. Healthy meal plan for weight management:  A healthy meal plan includes a variety of foods, contains fewer calories, and helps you stay healthy. A healthy meal plan includes the following:  Eat whole-grain foods more often. A healthy meal plan should contain fiber. Fiber is the part of grains, fruits, and vegetables that is not broken down by your body. Whole-grain foods are healthy and provide extra fiber in your diet. Some examples of whole-grain foods are whole-wheat breads and pastas, oatmeal, brown rice, and bulgur. Eat a variety of vegetables every day. Include dark, leafy greens such as spinach, kale, elsie greens, and mustard greens. Eat yellow and orange vegetables such as carrots, sweet potatoes, and winter squash. Eat a variety of fruits every day. Choose fresh or canned fruit (canned in its own juice or light syrup) instead of juice. Fruit juice has very little or no fiber. Eat low-fat dairy foods. Drink fat-free (skim) milk or 1% milk. Eat fat-free yogurt and low-fat cottage cheese. Try low-fat cheeses such as mozzarella and other reduced-fat cheeses. Choose meat and other protein foods that are low in fat. Choose beans or other legumes such as split peas or lentils.  Choose fish, skinless poultry (chicken or turkey), or lean cuts of red meat (beef or pork). Before you cook meat or poultry, cut off any visible fat. Use less fat and oil. Try baking foods instead of frying them. Add less fat, such as margarine, sour cream, regular salad dressing and mayonnaise to foods. Eat fewer high-fat foods. Some examples of high-fat foods include french fries, doughnuts, ice cream, and cakes. Eat fewer sweets. Limit foods and drinks that are high in sugar. This includes candy, cookies, regular soda, and sweetened drinks. Exercise:  Exercise at least 30 minutes per day on most days of the week. Some examples of exercise include walking, biking, dancing, and swimming. You can also fit in more physical activity by taking the stairs instead of the elevator or parking farther away from stores. Ask your healthcare provider about the best exercise plan for you. © Copyright [employer] 2018 Information is for End User's use only and may not be sold, redistributed or otherwise used for commercial purposes.  All illustrations and images included in CareNotes® are the copyrighted property of [employer] Inc. or [address] [place] [address]

## 2023-12-13 NOTE — PROGRESS NOTES
Assessment and Plan:     Problem List Items Addressed This Visit     Anxiety     Patient to continue utilizing medical therapy as well as counseling sources as applicable to condition. If suicidal thought or fear of suicide attempt, to call 911 and seek help immediately. Medications and therapy reviewed today and all patient  questions answered today. Relevant Medications    ALPRAZolam (XANAX) 0.5 mg tablet    Hyperlipidemia     Patient  is stable with current medication and we discussed a low fat low cholesterol diet. Weight loss also discussed for this will help lower cholesterol also. Recheck lipids in 6 months. Coronary artery disease involving native coronary artery of native heart without angina pectoris     Patient to continue  with current cardiac meds to decrease risk of re stenosis. Patient to follow up with cardiology for scheduled appointments to decrease risk for further cardiac problems with appropriate diagnostic testing to reassess cardiac status. Patient had alll questions about this problem answered today. Other specified hypothyroidism     Patient to continue current dose of thyroid medicine and recheck TSH in 6 months          Stage 3a chronic kidney disease ([address])   Other Visit Diagnoses     Well adult exam    -  Primary          Depression Screening and Follow-up Plan: Patient was screened for depression during today's encounter. They screened negative with a PHQ-2 score of 0. Preventive health issues were discussed with patient, and age appropriate screening tests were ordered as noted in patient's After Visit Summary. Personalized health advice and appropriate referrals for health education or preventive services given if needed, as noted in patient's After Visit Summary.      History of Present Illness:     Patient presents for a Medicare Wellness Visit    HPI   Patient Care Team:  Javier Dudley MD as PCP - General (Family Medicine)  Clifford Martinez MD Review of Systems:     Review of Systems     Problem List:     Patient Active Problem List   Diagnosis   • Anxiety   • Dyspnea on effort   • Murmur   • Encounter for gynecological examination without abnormal finding   • Osteopenia   • Hyperlipidemia   • Coronary artery disease involving native coronary artery of native heart without angina pectoris   • Other specified hypothyroidism   • Stage 3a chronic kidney disease ([address])      Past Medical and Surgical History:     Past Medical History:   Diagnosis Date   • Anxiety    • Back pain    • Disease of thyroid gland    • Diverticulitis    • Diverticulosis    • Fibroid    • GERD (gastroesophageal reflux disease)    • Heart murmur    • History of mammogram 11/01/2017   • Hypertension    • Hypothyroidism    • Osteoporosis    • Shortness of breath      Past Surgical History:   Procedure Laterality Date   • APPENDECTOMY     • COLONOSCOPY  2013   • EYE SURGERY Right 07/05/2017    catarect surgery    • EYE SURGERY Left 05/24/2017    catarect surgery    • HEMORRHOID SURGERY     • MAMMO (HISTORICAL)  11/01/2017   • THYROID SURGERY     • TOTAL ABDOMINAL HYSTERECTOMY     • TUBAL LIGATION        Family History:     Family History   Problem Relation Age of Onset   • Hypertension Father    • Asthma Son    • Arrhythmia Son       Social History:     Social History     Socioeconomic History   • Marital status: /Civil Union     Spouse name: None   • Number of children: None   • Years of education: 12   • Highest education level: None   Occupational History   • None   Tobacco Use   • Smoking status: Former     Types: Cigarettes   • Smokeless tobacco: Never   • Tobacco comments:     as per [employer]   Vaping Use   • Vaping status: Never Used   Substance and Sexual Activity   • Alcohol use: No     Comment: rarely   • Drug use: No   • Sexual activity: Not Currently     Birth control/protection: Surgical     Comment: NAN-BSO    Other Topics Concern   • None   Social History Narrative · Most recent tobacco use screenin2020     · Do you currently or have you served in the [address] [place] [address] [employer]:   No      · Were you activated, into active duty, as a member of the [employer] or as a Reservist:   No      · Exercise level:   Occasional      · Diet:   Regular      · General stress level:   Medium      · Caffeine intake: Moderate      · Advance directive: Yes      · Has the Patient had a mammogram to screen for breast cancer within 24 months:   Yes      · Please enter the date of the Patient's previous mammogram.:   2017      · Date of last Colonoscopy:   2013     - As per Matias Taylor     ·      Social Determinants of Health     Financial Resource Strain: Low Risk  (2023)    Overall Financial Resource Strain (CARDIA)    • Difficulty of Paying Living Expenses: Not very hard   Food Insecurity: Not on file   Transportation Needs: No Transportation Needs (2023)    PRAPARE - Transportation    • Lack of Transportation (Medical): No    • Lack of Transportation (Non-Medical):  No   Physical Activity: Not on file   Stress: Not on file   Social Connections: Not on file   Intimate Partner Violence: Not on file   Housing Stability: Not on file      Medications and Allergies:     Current Outpatient Medications   Medication Sig Dispense Refill   • ALPRAZolam (XANAX) 0.5 mg tablet Take 1 tablet (0.5 mg total) by mouth 2 (two) times a day as needed for anxiety 180 tablet 0   • aspirin (ECOTRIN LOW STRENGTH) 81 mg EC tablet Take 1 tablet (81 mg total) by mouth daily 30 tablet 0   • Calcium Carbonate-Vit D-Min (CALTRATE 600+D PLUS MINERALS) 600-800 MG-UNIT TABS Take by mouth daily       • Cholecalciferol (VITAMIN D3) 1000 units CAPS Take by mouth daily       • levothyroxine 100 mcg tablet `Take 1 tablet by mouth Mon, Wed and Fri 90 tablet 1   • levothyroxine 88 mcg tablet TAKE 1 TABLET BY MOUTH IN THE AM TUE, THUR, SAT AND SUN 48 tablet 3   • MAGNESIUM PO Take by mouth daily       • metoprolol tartrate (LOPRESSOR) 25 mg tablet TAKE 1/2 TABLET BY MOUTH TWICE A DAY 90 tablet 1   • Multiple Vitamins-Minerals (PreserVision AREDS 2) CAPS Take by mouth     • atorvastatin (LIPITOR) 20 mg tablet TAKE 1 TABLET BY MOUTH EVERY DAY (Patient not taking: Reported on 12/13/2023) 90 tablet 3     No current facility-administered medications for this visit. No Known Allergies   Immunizations:     Immunization History   Administered Date(s) Administered   • COVID-19 MODERNA VACC 0.5 ML IM 04/29/2021, 06/07/2021   • INFLUENZA 12/05/2014, 10/31/2016, 10/09/2018   • Influenza, high dose seasonal 0.7 mL 10/27/2021   • Pneumococcal Conjugate 13-Valent 01/18/2017, 10/06/2017   • Pneumococcal Polysaccharide PPV23 01/22/2020      Health Maintenance: There are no preventive care reminders to display for this patient. Topic Date Due   • Influenza Vaccine (1) 09/01/2023   • COVID-19 Vaccine (3 - 2023-24 season) 09/01/2023      Medicare Screening Tests and Risk Assessments:     Bobbi Love is here for her Subsequent Wellness visit. Health Risk Assessment:   Patient rates overall health as good. Patient feels that their physical health rating is same. Patient is satisfied with their life. Eyesight was rated as slightly worse. Hearing was rated as same. Patient feels that their emotional and mental health rating is same. Patients states they are sometimes angry. Patient states they are sometimes unusually tired/fatigued. Pain experienced in the last 7 days has been none. Patient states that she has experienced no weight loss or gain in last 6 months. Depression Screening:   PHQ-2 Score: 0      Fall Risk Screening: In the past year, patient has experienced: no history of falling in past year      Urinary Incontinence Screening:   Patient has not leaked urine accidently in the last six months. Home Safety:  Patient does not have trouble with stairs inside or outside of their home.  Patient has working smoke alarms and has working carbon monoxide detector. Home safety hazards include: none. Nutrition:   Current diet is Regular. Medications:   Patient is currently taking over-the-counter supplements. OTC medications include: see medication list. Patient is able to manage medications. Activities of Daily Living (ADLs)/Instrumental Activities of Daily Living (IADLs):   Walk and transfer into and out of bed and chair?: Yes  Dress and groom yourself?: Yes    Bathe or shower yourself?: Yes    Feed yourself? Yes  Do your laundry/housekeeping?: Yes  Manage your money, pay your bills and track your expenses?: Yes  Make your own meals?: Yes    Do your own shopping?: Yes    Previous Hospitalizations:   Any hospitalizations or ED visits within the last 12 months?: No      Advance Care Planning:   Living will: Yes    Durable POA for healthcare: Yes    Advanced directive: Yes      PREVENTIVE SCREENINGS      Cardiovascular Screening:    General: Screening Not Indicated and History Lipid Disorder      Diabetes Screening:     General: Screening Current      Cervical Cancer Screening:    General: Screening Not Indicated      Lung Cancer Screening:     General: Screening Not Indicated    Screening, Brief Intervention, and Referral to Treatment (SBIRT)    Screening      AUDIT-C Screenin) How often did you have a drink containing alcohol in the past year? never  2) How many drinks did you have on a typical day when you were drinking in the past year? 0  3) How often did you have 6 or more drinks on one occasion in the past year? never    AUDIT-C Score: 0  Interpretation: Score 0-2 (female): Negative screen for alcohol misuse    Single Item Drug Screening:  How often have you used an illegal drug (including marijuana) or a prescription medication for non-medical reasons in the past year? never    Single Item Drug Screen Score: 0  Interpretation: Negative screen for possible drug use disorder    No results found. Physical Exam:     /70 (BP Location: Left arm, Patient Position: Sitting, Cuff Size: Standard)   Pulse 68   Temp 97.7 °F (36.5 °C) (Skin)   Ht 4' 9.68" (1.465 m)   Wt 61.7 kg (136 lb)   LMP  (LMP Unknown)   SpO2 99%   BMI 28.74 kg/m²     Physical Exam     Tre Nixon MD

## 2023-12-14 ENCOUNTER — TELEPHONE (OUTPATIENT)
Age: 83
End: 2023-12-14

## 2024-01-10 ENCOUNTER — OFFICE VISIT (OUTPATIENT)
Dept: CARDIOLOGY CLINIC | Facility: CLINIC | Age: 84
End: 2024-01-10
Payer: MEDICARE

## 2024-01-10 VITALS
BODY MASS INDEX: 28.95 KG/M2 | OXYGEN SATURATION: 97 % | HEART RATE: 61 BPM | DIASTOLIC BLOOD PRESSURE: 82 MMHG | WEIGHT: 137.9 LBS | HEIGHT: 58 IN | SYSTOLIC BLOOD PRESSURE: 162 MMHG

## 2024-01-10 DIAGNOSIS — I25.10 CORONARY ARTERY DISEASE INVOLVING NATIVE CORONARY ARTERY OF NATIVE HEART WITHOUT ANGINA PECTORIS: Primary | ICD-10-CM

## 2024-01-10 DIAGNOSIS — E78.5 HYPERLIPIDEMIA, UNSPECIFIED HYPERLIPIDEMIA TYPE: ICD-10-CM

## 2024-01-10 DIAGNOSIS — N18.31 STAGE 3A CHRONIC KIDNEY DISEASE (HCC): ICD-10-CM

## 2024-01-10 PROCEDURE — 93000 ELECTROCARDIOGRAM COMPLETE: CPT | Performed by: INTERNAL MEDICINE

## 2024-01-10 PROCEDURE — 99214 OFFICE O/P EST MOD 30 MIN: CPT | Performed by: INTERNAL MEDICINE

## 2024-01-10 RX ORDER — ATORVASTATIN CALCIUM 20 MG/1
20 TABLET, FILM COATED ORAL DAILY
Qty: 90 TABLET | Refills: 3 | Status: SHIPPED | OUTPATIENT
Start: 2024-01-10

## 2024-01-10 NOTE — PROGRESS NOTES
Cardiology Follow Up    Sarah Bautista  1940  [id number]  [facility] [place]  [address]  [address]  [place] [address] [phone]    1. Coronary artery disease involving native coronary artery of native heart without angina pectoris        2. Hyperlipidemia, unspecified hyperlipidemia type  POCT ECG    atorvastatin (LIPITOR) 20 mg tablet      3. Stage 3a chronic kidney disease (HCC)            Interval History: Followup CAD.    She has no chest pain. She has some dyspnea with moderate exertion that is minor. No palpitations.     Medical Problems       Problem List       Anxiety    Dyspnea on effort    Murmur    Encounter for gynecological examination without abnormal finding    Overview Addendum 12/10/2018  1:40 PM by Sirisha Timmons MD     .  Lmp: NAN ()  Last pap: 16 negative,hpv-  Last mammo: 18 BR1- (3D)  Colonoscopy:  (due )  Dexa: 17 osteopenia (due )           Osteopenia    Hyperlipidemia    Coronary artery disease involving native coronary artery of native heart without angina pectoris    Other specified hypothyroidism    Stage 3a chronic kidney disease (HCC)    Lab Results   Component Value Date    EGFR 49 (L) 2023    EGFR 57 (L) 2023    EGFR 52 2021    CREATININE 1.12 (H) 2023    CREATININE 0.98 (H) 2023    CREATININE 0.97 (H) 2022             Past Medical History:   Diagnosis Date    Anxiety     Back pain     Disease of thyroid gland     Diverticulitis     Diverticulosis     Fibroid     GERD (gastroesophageal reflux disease)     Heart murmur     History of mammogram 2017    Hypertension     Hypothyroidism     Osteoporosis     Shortness of breath      Social History     Socioeconomic History    Marital status: /Civil Union     Spouse name: Not on file    Number of children: Not on file    Years of education: 12    Highest  education level: Not on file   Occupational History    Not on file   Tobacco Use    Smoking status: Former     Types: Cigarettes    Smokeless tobacco: Never    Tobacco comments:     as per Elida   Vaping Use    Vaping status: Never Used   Substance and Sexual Activity    Alcohol use: No     Comment: rarely    Drug use: No    Sexual activity: Not Currently     Birth control/protection: Surgical     Comment: NAN-NISHANT    Other Topics Concern    Not on file   Social History Narrative    · Most recent tobacco use screenin2020     · Do you currently or have you served in the [employer]:   No      · Were you activated, into active duty, as a member of the [employer] or as a Reservist:   No      · Exercise level:   Occasional      · Diet:   Regular      · General stress level:   Medium      · Caffeine intake:   Moderate      · Advance directive:   Yes      · Has the Patient had a mammogram to screen for breast cancer within 24 months:   Yes      · Please enter the date of the Patient's previous mammogram.:   2017      · Date of last Colonoscopy:   2013     - As per Elida     ·      Social Determinants of Health     Financial Resource Strain: Low Risk  (2023)    Overall Financial Resource Strain (CARDIA)     Difficulty of Paying Living Expenses: Not very hard   Food Insecurity: Not on file   Transportation Needs: No Transportation Needs (2023)    PRAPARE - Transportation     Lack of Transportation (Medical): No     Lack of Transportation (Non-Medical): No   Physical Activity: Not on file   Stress: Not on file   Social Connections: Not on file   Intimate Partner Violence: Not on file   Housing Stability: Not on file      Family History   Problem Relation Age of Onset    Hypertension Father     Asthma Son     Arrhythmia Son      Past Surgical History:   Procedure Laterality Date    APPENDECTOMY      COLONOSCOPY  2013    EYE SURGERY Right 2017    catarect surgery     EYE  "SURGERY Left 05/24/2017    catarect surgery     HEMORRHOID SURGERY      MAMMO (HISTORICAL)  11/01/2017    THYROID SURGERY      TOTAL ABDOMINAL HYSTERECTOMY      TUBAL LIGATION         Current Outpatient Medications:     aspirin (ECOTRIN LOW STRENGTH) 81 mg EC tablet, Take 1 tablet (81 mg total) by mouth daily, Disp: 30 tablet, Rfl: 0    atorvastatin (LIPITOR) 20 mg tablet, Take 1 tablet (20 mg total) by mouth daily, Disp: 90 tablet, Rfl: 3    Calcium Carbonate-Vit D-Min (CALTRATE 600+D PLUS MINERALS) 600-800 MG-UNIT TABS, Take by mouth daily  , Disp: , Rfl:     Cholecalciferol (VITAMIN D3) 1000 units CAPS, Take by mouth daily  , Disp: , Rfl:     levothyroxine 100 mcg tablet, `Take 1 tablet by mouth Mon, Wed and Fri, Disp: 90 tablet, Rfl: 1    levothyroxine 88 mcg tablet, TAKE 1 TABLET BY MOUTH IN THE AM TUE, THUR, SAT AND SUN, Disp: 48 tablet, Rfl: 3    MAGNESIUM PO, Take by mouth daily  , Disp: , Rfl:     metoprolol tartrate (LOPRESSOR) 25 mg tablet, TAKE 1/2 TABLET BY MOUTH TWICE A DAY, Disp: 90 tablet, Rfl: 1    Multiple Vitamins-Minerals (PreserVision AREDS 2) CAPS, Take by mouth, Disp: , Rfl:     ALPRAZolam (XANAX) 0.5 mg tablet, Take 1 tablet (0.5 mg total) by mouth 2 (two) times a day as needed for anxiety, Disp: 180 tablet, Rfl: 0  No Known Allergies    Labs:     Chemistry        Component Value Date/Time    K 4.9 06/26/2023 1045     06/26/2023 1045    CO2 27 06/26/2023 1045    BUN 17 06/26/2023 1045    CREATININE 1.12 (H) 06/26/2023 1045    CREATININE 1.01 02/24/2021 1114        Component Value Date/Time    CALCIUM 9.7 06/26/2023 1045    ALKPHOS 118 06/26/2023 1045    AST 17 06/26/2023 1045    ALT 16 06/26/2023 1045            No results found for: \"CHOL\"  Lab Results   Component Value Date    HDL 62 06/26/2023    HDL 68 02/03/2023    HDL 63 01/19/2022     Lab Results   Component Value Date    LDLCALC 70 06/26/2023    LDLCALC 63 02/03/2023    LDLCALC 47 01/19/2022     Lab Results   Component Value " "Date    TRIG 84 06/26/2023    TRIG 101 02/03/2023    TRIG 67 01/19/2022     No results found for: \"CHOLHDL\"    Imaging: No results found.    EKG: NSR Normal ECG .    Review of Systems   Constitutional: Negative.   HENT: Negative.     Eyes: Negative.    Cardiovascular: Negative.    Respiratory:  Positive for shortness of breath.    Endocrine: Negative.    Hematologic/Lymphatic: Negative.    Skin: Negative.    Musculoskeletal: Negative.    Gastrointestinal: Negative.    Genitourinary: Negative.    Neurological: Negative.    Psychiatric/Behavioral: Negative.     Allergic/Immunologic: Negative.        Vitals:    01/10/24 0926   BP: 162/82   Pulse: 61   SpO2: 97%           Physical Exam  Vitals and nursing note reviewed.   Constitutional:       Appearance: Normal appearance.   HENT:      Head: Normocephalic.      Nose: Nose normal.      Mouth/Throat:      Mouth: Mucous membranes are moist.   Eyes:      General: No scleral icterus.     Conjunctiva/sclera: Conjunctivae normal.   Cardiovascular:      Rate and Rhythm: Normal rate and regular rhythm.      Heart sounds: No murmur heard.     No gallop.   Pulmonary:      Effort: Pulmonary effort is normal. No respiratory distress.      Breath sounds: Normal breath sounds. No wheezing or rales.   Abdominal:      General: Abdomen is flat. Bowel sounds are normal. There is no distension.      Palpations: Abdomen is soft.      Tenderness: There is no abdominal tenderness. There is no guarding.   Musculoskeletal:      Cervical back: Normal range of motion and neck supple.      Right lower leg: No edema.      Left lower leg: No edema.   Skin:     General: Skin is warm and dry.   Neurological:      General: No focal deficit present.      Mental Status: She is alert and oriented to person, place, and time.   Psychiatric:         Mood and Affect: Mood normal.         Behavior: Behavior normal.         Discussion/Summary:    Coronary Artery Disease: Hx of nonobstructive CAD of LAD. No " prior hx of PCI. Continue with aspirin and atorvastatin. LDL is well controlled.     Hypertension: She has been on metoprolol. BP is high today. She will monitor her      The patient was counseled regarding diagnostic results, instructions for management, risk factor reductions, impressions. total time of encounter was 25 minutes and 15 minutes was spent counseling.

## 2024-02-06 DIAGNOSIS — E78.2 MIXED HYPERLIPIDEMIA: ICD-10-CM

## 2024-02-06 DIAGNOSIS — I10 ESSENTIAL HYPERTENSION: ICD-10-CM

## 2024-02-06 DIAGNOSIS — E03.9 HYPOTHYROIDISM, UNSPECIFIED TYPE: ICD-10-CM

## 2024-02-06 RX ORDER — LEVOTHYROXINE SODIUM 0.1 MG/1
TABLET ORAL
Qty: 36 TABLET | Refills: 1 | Status: SHIPPED | OUTPATIENT
Start: 2024-02-06

## 2024-02-21 PROBLEM — Z01.419 ENCOUNTER FOR GYNECOLOGICAL EXAMINATION WITHOUT ABNORMAL FINDING: Status: RESOLVED | Noted: 2018-12-10 | Resolved: 2024-02-21

## 2024-06-07 ENCOUNTER — RA CDI HCC (OUTPATIENT)
Dept: OTHER | Facility: HOSPITAL | Age: 84
End: 2024-06-07

## 2024-06-11 DIAGNOSIS — E03.9 HYPOTHYROIDISM, UNSPECIFIED TYPE: ICD-10-CM

## 2024-06-11 DIAGNOSIS — E78.2 MIXED HYPERLIPIDEMIA: ICD-10-CM

## 2024-06-11 DIAGNOSIS — I10 ESSENTIAL HYPERTENSION: ICD-10-CM

## 2024-06-11 RX ORDER — LEVOTHYROXINE SODIUM 0.1 MG/1
TABLET ORAL
Qty: 36 TABLET | Refills: 1 | Status: SHIPPED | OUTPATIENT
Start: 2024-06-11 | End: 2024-06-14 | Stop reason: SDUPTHER

## 2024-06-14 ENCOUNTER — OFFICE VISIT (OUTPATIENT)
Dept: FAMILY MEDICINE CLINIC | Facility: CLINIC | Age: 84
End: 2024-06-14
Payer: MEDICARE

## 2024-06-14 VITALS
OXYGEN SATURATION: 97 % | HEART RATE: 59 BPM | BODY MASS INDEX: 28.55 KG/M2 | TEMPERATURE: 97.3 F | DIASTOLIC BLOOD PRESSURE: 70 MMHG | HEIGHT: 58 IN | SYSTOLIC BLOOD PRESSURE: 122 MMHG | WEIGHT: 136 LBS

## 2024-06-14 DIAGNOSIS — E03.9 HYPOTHYROIDISM, UNSPECIFIED TYPE: ICD-10-CM

## 2024-06-14 DIAGNOSIS — I10 ESSENTIAL HYPERTENSION: ICD-10-CM

## 2024-06-14 DIAGNOSIS — R06.09 DYSPNEA ON EFFORT: ICD-10-CM

## 2024-06-14 DIAGNOSIS — I25.10 CORONARY ARTERY DISEASE INVOLVING NATIVE CORONARY ARTERY OF NATIVE HEART WITHOUT ANGINA PECTORIS: ICD-10-CM

## 2024-06-14 DIAGNOSIS — F41.9 ANXIETY: Primary | ICD-10-CM

## 2024-06-14 DIAGNOSIS — R94.39 ABNORMAL STRESS TEST: ICD-10-CM

## 2024-06-14 DIAGNOSIS — N18.31 STAGE 3A CHRONIC KIDNEY DISEASE (HCC): ICD-10-CM

## 2024-06-14 DIAGNOSIS — E78.2 MIXED HYPERLIPIDEMIA: ICD-10-CM

## 2024-06-14 DIAGNOSIS — E78.5 HYPERLIPIDEMIA, UNSPECIFIED HYPERLIPIDEMIA TYPE: ICD-10-CM

## 2024-06-14 PROCEDURE — 99214 OFFICE O/P EST MOD 30 MIN: CPT | Performed by: FAMILY MEDICINE

## 2024-06-14 PROCEDURE — G0439 PPPS, SUBSEQ VISIT: HCPCS | Performed by: FAMILY MEDICINE

## 2024-06-14 RX ORDER — ATORVASTATIN CALCIUM 20 MG/1
20 TABLET, FILM COATED ORAL DAILY
Qty: 90 TABLET | Refills: 3 | Status: SHIPPED | OUTPATIENT
Start: 2024-06-14

## 2024-06-14 RX ORDER — ALPRAZOLAM 0.5 MG/1
0.5 TABLET ORAL 2 TIMES DAILY PRN
Qty: 180 TABLET | Refills: 0 | Status: SHIPPED | OUTPATIENT
Start: 2024-06-14

## 2024-06-14 RX ORDER — LEVOTHYROXINE SODIUM 88 UG/1
TABLET ORAL
Qty: 48 TABLET | Refills: 3 | Status: SHIPPED | OUTPATIENT
Start: 2024-06-14

## 2024-06-14 RX ORDER — LEVOTHYROXINE SODIUM 0.1 MG/1
TABLET ORAL
Qty: 36 TABLET | Refills: 1 | Status: SHIPPED | OUTPATIENT
Start: 2024-06-14

## 2024-06-14 NOTE — ASSESSMENT & PLAN NOTE
Lab Results   Component Value Date    EGFR 49 (L) 06/26/2023    EGFR 57 (L) 02/03/2023    EGFR 52 02/24/2021    CREATININE 1.12 (H) 06/26/2023    CREATININE 0.98 (H) 02/03/2023    CREATININE 0.97 (H) 01/19/2022   Pt to avoid NSAIDs and any IV dyes. Patient to follow up eoither with nephrology or  with us for  further  monitoring of  renal function.

## 2024-09-22 NOTE — PROGRESS NOTES
Ambulatory Visit  Name: Sarah Bautista      : 1940      MRN: [id number]  Encounter Provider: Brett Conde MD  Encounter Date: 2024   Encounter department: [facility]    Assessment & Plan   1. Anxiety  Assessment & Plan:  Patient to continue utilizing medical therapy as well as counseling sources as applicable to condition. If suicidal thought or fear of suicide attempt, to call 911 and seek help immediately. Medications and therapy reviewed today and all patient  questions answered today.   Orders:  -     ALPRAZolam (XANAX) 0.5 mg tablet; Take 1 tablet (0.5 mg total) by mouth 2 (two) times a day as needed for anxiety  2. Coronary artery disease involving native coronary artery of native heart without angina pectoris  Assessment & Plan:  Patient to continue  with current cardiac meds to decrease risk of re stenosis. Patient to follow up with cardiology for scheduled appointments to decrease risk for further cardiac problems with appropriate diagnostic testing to reassess cardiac status. Patient had alll questions about this problem answered today.   3. Mixed hyperlipidemia  Assessment & Plan:  Patient  is stable with current medication and we discussed a low fat low cholesterol diet. Weight loss also discussed for this will help lower cholesterol also. Recheck lipids in 6 months.   Orders:  -     Lipid Panel with Direct LDL reflex; Future  -     levothyroxine 100 mcg tablet; TAKE ONE TABLET BY MOUTH MONDAY,WEDNESDAY, AND FRIDAY  -     levothyroxine 88 mcg tablet; TAKE 1 TABLET BY MOUTH IN THE AM TUE, THUR, SAT AND SUN  -     Lipid Panel with Direct LDL reflex  4. Stage 3a chronic kidney disease (HCC)  Assessment & Plan:  Lab Results   Component Value Date    EGFR 49 (L) 2023    EGFR 57 (L) 2023    EGFR 52 2021    CREATININE 1.12 (H) 2023    CREATININE 0.98 (H) 2023    CREATININE 0.97 (H) 2022   Pt to avoid NSAIDs and any IV dyes.  Patient to follow up eoither with nephrology or  with us for  further  monitoring of  renal function.   5. Essential hypertension  -     Comprehensive metabolic panel; Future  -     CBC and differential; Future  -     TSH, 3rd generation with Free T4 reflex; Future  -     Magnesium; Future  -     Uric acid; Future  -     UA/M w/rflx Culture, Comp  -     levothyroxine 100 mcg tablet; TAKE ONE TABLET BY MOUTH MONDAY,WEDNESDAY, AND FRIDAY  -     levothyroxine 88 mcg tablet; TAKE 1 TABLET BY MOUTH IN THE AM TUE, THUR, SAT AND SUN  -     Comprehensive metabolic panel  -     CBC and differential  -     TSH, 3rd generation with Free T4 reflex  -     Magnesium  -     Uric acid  6. Hypothyroidism, unspecified type  -     levothyroxine 100 mcg tablet; TAKE ONE TABLET BY MOUTH MONDAY,WEDNESDAY, AND FRIDAY  7. Hyperlipidemia, unspecified hyperlipidemia type  Assessment & Plan:  Patient  is stable with current medication and we discussed a low fat low cholesterol diet. Weight loss also discussed for this will help lower cholesterol also. Recheck lipids in 6 months.   Orders:  -     atorvastatin (LIPITOR) 20 mg tablet; Take 1 tablet (20 mg total) by mouth daily  8. Dyspnea on effort  -     metoprolol tartrate (LOPRESSOR) 25 mg tablet; Take 0.5 tablets (12.5 mg total) by mouth 2 (two) times a day  9. Abnormal stress test  -     metoprolol tartrate (LOPRESSOR) 25 mg tablet; Take 0.5 tablets (12.5 mg total) by mouth 2 (two) times a day      Falls Plan of Care: balance, strength, and gait training instructions were provided. Home safety education provided.     Urinary Incontinence Plan of Care: counseling topics discussed: practice Kegel (pelvic floor strengthening) exercises, use restroom every 2 hours, limit alcohol, caffeine, spicy foods, and acidic foods, limiting fluid intake 3-4 hours before bed, weight loss, preventing constipation and limiting fluid intake to 60 oz. per day.       History of Present Illness     HPI  Review of  Systems  Past Medical History:   Diagnosis Date   • Anxiety    • Back pain    • Disease of thyroid gland    • Diverticulitis    • Diverticulosis    • Fibroid    • GERD (gastroesophageal reflux disease)    • Heart murmur    • History of mammogram 11/01/2017   • Hypertension    • Hypothyroidism    • Osteoporosis    • Shortness of breath      Past Surgical History:   Procedure Laterality Date   • APPENDECTOMY     • COLONOSCOPY  2013   • EYE SURGERY Right 07/05/2017    catarect surgery    • EYE SURGERY Left 05/24/2017    catarect surgery    • HEMORRHOID SURGERY     • MAMMO (HISTORICAL)  11/01/2017   • THYROID SURGERY     • TOTAL ABDOMINAL HYSTERECTOMY     • TUBAL LIGATION       Family History   Problem Relation Age of Onset   • Hypertension Father    • Asthma Son    • Arrhythmia Son      Social History     Tobacco Use   • Smoking status: Former     Types: Cigarettes   • Smokeless tobacco: Never   • Tobacco comments:     as per Elida   Vaping Use   • Vaping status: Never Used   Substance and Sexual Activity   • Alcohol use: No     Comment: rarely   • Drug use: No   • Sexual activity: Not Currently     Birth control/protection: Surgical     Comment: NAN-BSO      Current Outpatient Medications on File Prior to Visit   Medication Sig   • aspirin (ECOTRIN LOW STRENGTH) 81 mg EC tablet Take 1 tablet (81 mg total) by mouth daily   • Calcium Carbonate-Vit D-Min (CALTRATE 600+D PLUS MINERALS) 600-800 MG-UNIT TABS Take by mouth daily     • MAGNESIUM PO Take by mouth daily     • Multiple Vitamins-Minerals (PreserVision AREDS 2) CAPS Take by mouth   • [DISCONTINUED] ALPRAZolam (XANAX) 0.5 mg tablet Take 1 tablet (0.5 mg total) by mouth 2 (two) times a day as needed for anxiety   • [DISCONTINUED] atorvastatin (LIPITOR) 20 mg tablet Take 1 tablet (20 mg total) by mouth daily   • [DISCONTINUED] levothyroxine 100 mcg tablet TAKE ONE TABLET BY MOUTH MONDAY,WEDNESDAY, AND FRIDAY   • [DISCONTINUED] levothyroxine 88 mcg tablet TAKE 1  "TABLET BY MOUTH IN THE AM TUE, THUR, SAT AND SUN   • [DISCONTINUED] metoprolol tartrate (LOPRESSOR) 25 mg tablet TAKE 1/2 TABLET BY MOUTH TWICE A DAY   • Cholecalciferol (VITAMIN D3) 1000 units CAPS Take by mouth daily   (Patient not taking: Reported on 6/14/2024)     No Known Allergies  Immunization History   Administered Date(s) Administered   • COVID-19 MODERNA VACC 0.5 ML IM 04/29/2021, 06/07/2021   • INFLUENZA 12/05/2014, 10/31/2016, 10/09/2018   • Influenza, high dose seasonal 0.7 mL 10/27/2021   • Pneumococcal Conjugate 13-Valent 01/18/2017, 10/06/2017   • Pneumococcal Polysaccharide PPV23 01/22/2020     Objective     /70 (BP Location: Left arm, Patient Position: Sitting, Cuff Size: Standard)   Pulse 59   Temp (!) 97.3 °F (36.3 °C)   Ht 4' 10\" (1.473 m)   Wt 61.7 kg (136 lb)   LMP  (LMP Unknown)   SpO2 97%   BMI 28.42 kg/m²     Physical Exam  Administrative Statements         " no

## 2024-12-10 DIAGNOSIS — R94.39 ABNORMAL STRESS TEST: ICD-10-CM

## 2024-12-10 DIAGNOSIS — R06.09 DYSPNEA ON EFFORT: ICD-10-CM

## 2024-12-12 RX ORDER — METOPROLOL TARTRATE 25 MG/1
TABLET, FILM COATED ORAL
Qty: 90 TABLET | Refills: 1 | Status: SHIPPED | OUTPATIENT
Start: 2024-12-12

## 2024-12-13 NOTE — PROGRESS NOTES
Name: Chuy Jones      : 1940      MRN: [id number]  Encounter Provider: Javier Dudley MD  Encounter Date: 2023   Encounter department: [address] [place] [address] Avenue     1. Well adult exam    2. Other specified hypothyroidism  Assessment & Plan:  Patient to continue current dose of thyroid medicine and recheck TSH in 6 months       3. Mixed hyperlipidemia  Assessment & Plan:  Patient  is stable with current medication and we discussed a low fat low cholesterol diet. Weight loss also discussed for this will help lower cholesterol also. Recheck lipids in 6 months. 4. Coronary artery disease involving native coronary artery of native heart without angina pectoris  Assessment & Plan:  Patient to continue  with current cardiac meds to decrease risk of re stenosis. Patient to follow up with cardiology for scheduled appointments to decrease risk for further cardiac problems with appropriate diagnostic testing to reassess cardiac status. Patient had alll questions about this problem answered today. 5. Anxiety  Assessment & Plan:  Patient to continue utilizing medical therapy as well as counseling sources as applicable to condition. If suicidal thought or fear of suicide attempt, to call 911 and seek help immediately. Medications and therapy reviewed today and all patient  questions answered today. Orders:  -     ALPRAZolam (XANAX) 0.5 mg tablet; Take 1 tablet (0.5 mg total) by mouth 2 (two) times a day as needed for anxiety    6. Stage 3a chronic kidney disease (HCC)      BMI Counseling: Body mass index is 28.74 kg/m².  The BMI is above normal. Nutrition recommendations include decreasing portion sizes, encouraging healthy choices of fruits and vegetables, decreasing fast food intake, consuming healthier snacks, limiting drinks that contain sugar, moderation in carbohydrate intake, increasing intake of lean protein, reducing intake of saturated and trans [facility]  Physical Therapy Re-Evaluation     Name: Felicity Cohen  [facility] Number: [id number]     Therapy Diagnosis:        Encounter Diagnoses   Name Primary?    Rupture of anterior cruciate ligament of left knee, initial encounter      Tear of lateral collateral ligament of left knee, initial encounter      Acute medial meniscal injury of left knee, subsequent encounter      Acute pain of left knee Yes      Physician: Jhony Berry MD     Physician Orders: PT Eval and Treat   Medical Diagnosis from Referral:   S83.512A (ICD-10-CM) - Rupture of anterior cruciate ligament of left knee, initial encounter   S83.422A (ICD-10-CM) - Tear of lateral collateral ligament of left knee, initial encounter   S83.8X2D (ICD-10-CM) - Acute medial meniscal injury of left knee, subsequent encounter      Evaluation Date: 10/24/2024  Re-evaluation date: 12/13/2024  Authorization Period Expiration: 12/31/25  Plan of Care Expiration: 12/31/25  Visit # / Visits authorized: 1/ 20   FOTO: 1/3     Time In: 835 am  Time Out: 905 am  Total Billable Time: 30 minutes     Precautions: Standard, post op  PROCEDURE PERFORMED:   Left ACL reconstruction with quadriceps tendon autograft   Left posterolateral corner reconstruction with allograft  Left knee lateral extra-articular tenodesis  Left knee arthroscopic medial meniscus repair   Left knee arthroscopic lateral meniscus repair   Left knee arthroscopic synovectomy   Left knee arthroscopic lysis of adhesions   Left knee open peroneal nerve neurolysis         Subjective   Date of onset: 9/10/24  History of current condition - Felicity reports: 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 Her L knee went numb and she had tingling and had pain instantly. She first went to the ER and then followed up with ortho. She had MRI and was dx with ACL, LCL and medial meniscus tear on L knee. Pt referred to PT to improve L knee symptoms until after this  "semester of nursing school.     12/13/2024: Pt returns to clinic post op ACL, medial and lateral meniscus repairs, PLL reconstruction which took place yesterday. She is currently in a WC and understands she is NWB. Pt notes being really tired and did not get home last night after surgery until 10pm. Pt would like to review HEP.              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     Felicity MENJIVAR has a current medication list which includes the following prescription(s): biotin, ergocalciferol, ergocalciferol, lactobacillus rhamnosus gg, mupirocin, omega-3/dha/epa/ala/vitamin d3, polyethylene glycol, and triamcinolone acetonide 0.1%.          Review of patient's allergies indicates:   Allergen Reactions    Ibuprofen Hives         Imaging, MRI studies     Prior Therapy: none  Social History: she lives with their family  Occupation: nursing school  Prior Level of Function: walking and lifting  Current Level of Function: limited secondary to pain and ROM     Pain:  Current 5/10, worst 8/10, best 5/10   Location: left knee   Description: Aching and Burning  Aggravating Factors: Bending, Walking, Night Time, and Extension  Easing Factors: ice     Pts goals: "to be able to work normally"     Objective      Observation: pt appears stated age, A&O x3        Posture: in WC        Gait: unable to assess secondary to dizziness with standing and pain     Range of Motion: (PROM):     Knee Left Right   Extension  (0) degrees P!  (+10 hyper) degrees   Flexion  (NT) degrees   (110) degrees               Strength:  Hip Left Right   Flexion NT 5/5   Abduction NT 3+/5    " fat and reducing intake of cholesterol. Exercise recommendations include exercising 3-5 times per week. No pharmacotherapy was ordered. Patient referred to PCP. Rationale for BMI follow-up plan is due to patient being overweight or obese. Depression Screening and Follow-up Plan: Patient was screened for depression during today's encounter. They screened negative with a PHQ-2 score of 0. Falls Plan of Care: balance, strength, and gait training instructions were provided. Home safety education provided. Urinary Incontinence Plan of Care: counseling topics discussed: practice Kegel (pelvic floor strengthening) exercises, use restroom every 2 hours, limit alcohol, caffeine, spicy foods, and acidic foods, limiting fluid intake 3-4 hours before bed, weight loss, preventing constipation and limiting fluid intake to 60 oz. per day. Subjective     An 16-year-old female today for checkup on multimedical problems as well as her Medicare wellness. Patient hypothyroidism hyperlipidemia history of coronary artery disease and is doing quite well. Patient is due for her lab work. She is on a drug holiday from her Lipitor and we will see how her cholesterol is off of the medication. She is also due to have her TSH and T4 checked which have ordered for today also. She needs refills on her Xanax. One of her diagnoses is a history of some anxiety. Patient has had her flu shot already. I have also recommended the RSV vaccine for her. Review of Systems   Constitutional:  Negative for activity change, appetite change, fatigue and fever. HENT:  Negative for congestion, ear pain, postnasal drip, rhinorrhea, sinus pressure, sinus pain, sneezing and sore throat. Eyes:  Negative for pain and redness. Respiratory:  Negative for apnea, cough, chest tightness, shortness of breath and wheezing. Cardiovascular:  Negative for chest pain, palpitations and leg swelling.    Gastrointestinal:  Negative for   Knee Left Right   Extension NT 5/5   Flexion NT 5/5            Special Tests: not indicated post operative status         Joint Mobility: unable to assess secondary to dressing     Palpation: unable to assess secondary to dressing    Sensation: WNL BLE     Flexibility: unable to assess secondary to dressing        Intake Outcome Measure for FOTO Knee Survey     Therapist reviewed FOTO scores for Felicity Cohen on 10/24/2024.   FOTO documents entered into [email or web address] - see Media section.            TREATMENT   Treatment Time In: 845  Treatment Time Out: 905  Total Treatment time separate from Evaluation: 20 minutes     Felicity received therapeutic exercises to develop education for 20 minutes including:  FOTO  Gait training reviewed  Pt education and HEP review     *billed per medicaid guidelines     Home Exercises and Patient Education Provided     Education provided re: HEP to Go     Written Home Exercises Provided: yes.  Exercises were reviewed and Felicity was able to demonstrate them prior to the end of the session.   Pt received a written copy of exercises to perform at home. Felicity demonstrated good  understanding of the education provided.      See EMR under patient instructions for exercises given.   Assessment   Felicity MENJIVAR is a 25 y.o. female referred to outpatient Physical Therapy with a medical diagnosis of s/p L ACL, LCL and medial meniscus repairs and PLC reconstruction. Pt presents with decreased L knee ROM, strength, and pain secondary to recent surgery limiting all ADLs.     Pt prognosis is Good.   Pt will benefit from skilled outpatient Physical Therapy to address the deficits stated above and in the chart below, provide pt/family education, and to maximize pt's level of independence.      Plan of care discussed with patient: Yes  Pt's spiritual, cultural and educational needs considered and patient is agreeable to the plan of care and goals as stated below:      Anticipated Barriers for therapy:  abdominal pain, constipation, diarrhea, nausea and vomiting. Endocrine: Negative for cold intolerance and heat intolerance. Genitourinary:  Negative for difficulty urinating, dysuria, frequency, hematuria and urgency. Musculoskeletal:  Negative for arthralgias, back pain, gait problem and myalgias. Skin:  Negative for rash. Neurological:  Negative for dizziness, speech difficulty, weakness, numbness and headaches. Hematological:  Does not bruise/bleed easily. Psychiatric/Behavioral:  Negative for agitation, confusion and hallucinations.         Past Medical History:   Diagnosis Date   • Anxiety    • Back pain    • Disease of thyroid gland    • Diverticulitis    • Diverticulosis    • Fibroid    • GERD (gastroesophageal reflux disease)    • Heart murmur    • History of mammogram 11/01/2017   • Hypertension    • Hypothyroidism    • Osteoporosis    • Shortness of breath      Past Surgical History:   Procedure Laterality Date   • APPENDECTOMY     • COLONOSCOPY  2013   • EYE SURGERY Right 07/05/2017    catarect surgery    • EYE SURGERY Left 05/24/2017    catarect surgery    • HEMORRHOID SURGERY     • MAMMO (HISTORICAL)  11/01/2017   • THYROID SURGERY     • TOTAL ABDOMINAL HYSTERECTOMY     • TUBAL LIGATION       Family History   Problem Relation Age of Onset   • Hypertension Father    • Asthma Son    • Arrhythmia Son      Social History     Socioeconomic History   • Marital status: /Civil Union     Spouse name: None   • Number of children: None   • Years of education: 12   • Highest education level: None   Occupational History   • None   Tobacco Use   • Smoking status: Former     Types: Cigarettes   • Smokeless tobacco: Never   • Tobacco comments:     as per [employer]   Vaping Use   • Vaping status: Never Used   Substance and Sexual Activity   • Alcohol use: No     Comment: rarely   • Drug use: No   • Sexual activity: Not Currently     Birth control/protection: Surgical     Comment: NAN-BSO    Other Topics Concern   • None   Social History Narrative    · Most recent tobacco use screenin2020     · Do you currently or have you served in the [address] [place] [address] [employer]:   No      · Were you activated, into active duty, as a member of the [employer] or as a Reservist:   No      · Exercise level:   Occasional      · Diet:   Regular      · General stress level:   Medium      · Caffeine intake: Moderate      · Advance directive: Yes      · Has the Patient had a mammogram to screen for breast cancer within 24 months:   Yes      · Please enter the date of the Patient's previous mammogram.:   2017      · Date of last Colonoscopy:   2013     - As per [facility]     ·      Social Determinants of Health     Financial Resource Strain: Low Risk  (2023)    Overall Financial Resource Strain (CARDIA)    • Difficulty of Paying Living Expenses: Not very hard   Food Insecurity: Not on file   Transportation Needs: No Transportation Needs (2023)    PRAPARE - Transportation    • Lack of Transportation (Medical): No    • Lack of Transportation (Non-Medical):  No   Physical Activity: Not on file   Stress: Not on file   Social Connections: Not on file   Intimate Partner Violence: Not on file   Housing Stability: Not on file     Current Outpatient Medications on File Prior to Visit   Medication Sig   • aspirin (ECOTRIN LOW STRENGTH) 81 mg EC tablet Take 1 tablet (81 mg total) by mouth daily   • Calcium Carbonate-Vit D-Min (CALTRATE 600+D PLUS MINERALS) 600-800 MG-UNIT TABS Take by mouth daily     • Cholecalciferol (VITAMIN D3) 1000 units CAPS Take by mouth daily     • levothyroxine 100 mcg tablet `Take 1 tablet by mouth Mon, Wed and Fri   • levothyroxine 88 mcg tablet TAKE 1 TABLET BY MOUTH IN THE AM TUE, THUR, SAT AND SUN   • MAGNESIUM PO Take by mouth daily     • metoprolol tartrate (LOPRESSOR) 25 mg tablet TAKE 1/2 TABLET BY MOUTH TWICE A DAY   • Multiple Vitamins-Minerals (PreserVision AREDS 2) CAPS Take by mouth none     Medical Necessity is demonstrated by the following  History  Co-morbidities and personal factors that may impact the plan of care [x] LOW: no personal factors / co-morbidities  [] MODERATE: 1-2 personal factors / co-morbidities  [] HIGH: 3+ personal factors / co-morbidities     Moderate / High Support Documentation:   Co-morbidities affecting plan of care:      Personal Factors:   no deficits      Examination  Body Structures and Functions, activity limitations and participation restrictions that may impact the plan of care [x] LOW: addressing 1-2 elements  [] MODERATE: 3+ elements  [] HIGH: 4+ elements (please support below)     Moderate / High Support Documentation:       Clinical Presentation [x] LOW: stable  [] MODERATE: Evolving  [] HIGH: Unstable      Decision Making/ Complexity Score: low         Goals:  Short Term Goals: 6 weeks Post op  - Pt will increase ROM to +5 L knee hyperextension, 90 deg flex L knee  - Decrease Pain to 3/10 as worst with all PT interventions  - Pt to self correct posture and gait with no AD or brace and minimal cues  - Pt independent with HEP with progressions.      Long Term Goals ( 1 year):  - Pt will increase ROM to 0 deg L knee hyperextension and 110 deg flexion  - Pt will increase strength to 5/5 L knee in all planes  - Decrease Pain to 0/10 as worst with stairs, walking and lifting weights  - Pt to return to 80% PLOF        Plan   Plan of care Certification: 12/13/2024 to 12/31/25.     Outpatient Physical Therapy 2 times weekly for 52 weeks to include the following interventions: Gait Training, Manual Therapy, Moist Heat/ Ice, Neuromuscular Re-ed, Patient Education, Therapeutic Activities, and Therapeutic Exercise.      Deborah Coe, PT, DPT, OCS, COMT   • [DISCONTINUED] ALPRAZolam (XANAX) 0.5 mg tablet Take 1 tablet (0.5 mg total) by mouth 2 (two) times a day as needed for anxiety   • atorvastatin (LIPITOR) 20 mg tablet TAKE 1 TABLET BY MOUTH EVERY DAY (Patient not taking: Reported on 12/13/2023)   • [DISCONTINUED] famotidine (PEPCID) 20 mg tablet TAKE 2 TABLETS BY MOUTH EVERY DAY (Patient not taking: Reported on 7/25/2022)     No Known Allergies  Immunization History   Administered Date(s) Administered   • COVID-19 MODERNA VACC 0.5 ML IM 04/29/2021, 06/07/2021   • INFLUENZA 12/05/2014, 10/31/2016, 10/09/2018   • Influenza, high dose seasonal 0.7 mL 10/27/2021   • Pneumococcal Conjugate 13-Valent 01/18/2017, 10/06/2017   • Pneumococcal Polysaccharide PPV23 01/22/2020       Objective     /70 (BP Location: Left arm, Patient Position: Sitting, Cuff Size: Standard)   Pulse 68   Temp 97.7 °F (36.5 °C) (Skin)   Ht 4' 9.68" (1.465 m)   Wt 61.7 kg (136 lb)   LMP  (LMP Unknown)   SpO2 99%   BMI 28.74 kg/m²     Physical Exam  Vitals and nursing note reviewed. Constitutional:       Appearance: She is well-developed. HENT:      Head: Normocephalic and atraumatic. Nose: Nose normal.      Mouth/Throat:      Mouth: Mucous membranes are moist.   Eyes:      General: No scleral icterus. Conjunctiva/sclera: Conjunctivae normal.      Pupils: Pupils are equal, round, and reactive to light. Neck:      Thyroid: No thyromegaly. Cardiovascular:      Rate and Rhythm: Normal rate and regular rhythm. Pulmonary:      Effort: Pulmonary effort is normal.      Breath sounds: Normal breath sounds. No wheezing. Abdominal:      General: Bowel sounds are normal. There is no distension. Palpations: Abdomen is soft. Tenderness: There is no abdominal tenderness. There is no guarding or rebound. Musculoskeletal:         General: No tenderness or deformity. Normal range of motion. Cervical back: Normal range of motion and neck supple.    Skin: General: Skin is warm and dry. Findings: No erythema or rash. Neurological:      Mental Status: She is alert and oriented to person, place, and time. Sensory: No sensory deficit. Psychiatric:         Mood and Affect: Mood normal.         Behavior: Behavior normal.         Thought Content:  Thought content normal.         Judgment: Judgment normal.       Denis Alegria MD

## 2025-01-21 ENCOUNTER — APPOINTMENT (EMERGENCY)
Dept: RADIOLOGY | Facility: HOSPITAL | Age: 85
End: 2025-01-21
Payer: MEDICARE

## 2025-01-21 ENCOUNTER — HOSPITAL ENCOUNTER (EMERGENCY)
Facility: HOSPITAL | Age: 85
Discharge: HOME | End: 2025-01-21
Attending: EMERGENCY MEDICINE | Admitting: EMERGENCY MEDICINE
Payer: MEDICARE

## 2025-01-21 VITALS
DIASTOLIC BLOOD PRESSURE: 63 MMHG | HEIGHT: 58 IN | HEART RATE: 86 BPM | TEMPERATURE: 98 F | RESPIRATION RATE: 20 BRPM | BODY MASS INDEX: 28.55 KG/M2 | OXYGEN SATURATION: 99 % | SYSTOLIC BLOOD PRESSURE: 143 MMHG | WEIGHT: 136 LBS

## 2025-01-21 DIAGNOSIS — R42 LIGHTHEADEDNESS: Primary | ICD-10-CM

## 2025-01-21 DIAGNOSIS — R05.9 COUGH: ICD-10-CM

## 2025-01-21 LAB
2HR DELTA HS TROPONIN: 0 NG/L
ALBUMIN SERPL BCG-MCNC: 4 G/DL (ref 3.5–5)
ALP SERPL-CCNC: 60 U/L (ref 34–104)
ALT SERPL W P-5'-P-CCNC: 7 U/L (ref 7–52)
ANION GAP SERPL CALCULATED.3IONS-SCNC: 9 MMOL/L (ref 4–13)
AST SERPL W P-5'-P-CCNC: 11 U/L (ref 13–39)
BASOPHILS # BLD AUTO: 0.05 THOUSANDS/ΜL (ref 0–0.1)
BASOPHILS NFR BLD AUTO: 1 % (ref 0–1)
BILIRUB SERPL-MCNC: 0.7 MG/DL (ref 0.2–1)
BUN SERPL-MCNC: 16 MG/DL (ref 5–25)
CALCIUM SERPL-MCNC: 9.3 MG/DL (ref 8.4–10.2)
CARDIAC TROPONIN I PNL SERPL HS: 4 NG/L (ref ?–50)
CARDIAC TROPONIN I PNL SERPL HS: 4 NG/L (ref ?–50)
CHLORIDE SERPL-SCNC: 103 MMOL/L (ref 96–108)
CO2 SERPL-SCNC: 26 MMOL/L (ref 21–32)
CREAT SERPL-MCNC: 1.16 MG/DL (ref 0.6–1.3)
EOSINOPHIL # BLD AUTO: 0.02 THOUSAND/ΜL (ref 0–0.61)
EOSINOPHIL NFR BLD AUTO: 0 % (ref 0–6)
ERYTHROCYTE [DISTWIDTH] IN BLOOD BY AUTOMATED COUNT: 13.3 % (ref 11.6–15.1)
FLUAV RNA RESP QL NAA+PROBE: NEGATIVE
FLUBV RNA RESP QL NAA+PROBE: NEGATIVE
GFR SERPL CREATININE-BSD FRML MDRD: 43 ML/MIN/1.73SQ M
GLUCOSE SERPL-MCNC: 106 MG/DL (ref 65–140)
HCT VFR BLD AUTO: 40.5 % (ref 34.8–46.1)
HGB BLD-MCNC: 13.3 G/DL (ref 11.5–15.4)
IMM GRANULOCYTES # BLD AUTO: 0.02 THOUSAND/UL (ref 0–0.2)
IMM GRANULOCYTES NFR BLD AUTO: 0 % (ref 0–2)
LYMPHOCYTES # BLD AUTO: 1.3 THOUSANDS/ΜL (ref 0.6–4.47)
LYMPHOCYTES NFR BLD AUTO: 21 % (ref 14–44)
MAGNESIUM SERPL-MCNC: 2.3 MG/DL (ref 1.9–2.7)
MCH RBC QN AUTO: 30.6 PG (ref 26.8–34.3)
MCHC RBC AUTO-ENTMCNC: 32.8 G/DL (ref 31.4–37.4)
MCV RBC AUTO: 93 FL (ref 82–98)
MONOCYTES # BLD AUTO: 0.52 THOUSAND/ΜL (ref 0.17–1.22)
MONOCYTES NFR BLD AUTO: 9 % (ref 4–12)
NEUTROPHILS # BLD AUTO: 4.2 THOUSANDS/ΜL (ref 1.85–7.62)
NEUTS SEG NFR BLD AUTO: 69 % (ref 43–75)
NRBC BLD AUTO-RTO: 0 /100 WBCS
PLATELET # BLD AUTO: 288 THOUSANDS/UL (ref 149–390)
PMV BLD AUTO: 8.9 FL (ref 8.9–12.7)
POTASSIUM SERPL-SCNC: 3.7 MMOL/L (ref 3.5–5.3)
PROT SERPL-MCNC: 6.9 G/DL (ref 6.4–8.4)
RBC # BLD AUTO: 4.35 MILLION/UL (ref 3.81–5.12)
RSV RNA RESP QL NAA+PROBE: NEGATIVE
SARS-COV-2 RNA RESP QL NAA+PROBE: NEGATIVE
SODIUM SERPL-SCNC: 138 MMOL/L (ref 135–147)
WBC # BLD AUTO: 6.11 THOUSAND/UL (ref 4.31–10.16)

## 2025-01-21 PROCEDURE — 99285 EMERGENCY DEPT VISIT HI MDM: CPT | Performed by: EMERGENCY MEDICINE

## 2025-01-21 PROCEDURE — 36415 COLL VENOUS BLD VENIPUNCTURE: CPT | Performed by: EMERGENCY MEDICINE

## 2025-01-21 PROCEDURE — 71046 X-RAY EXAM CHEST 2 VIEWS: CPT

## 2025-01-21 PROCEDURE — 99285 EMERGENCY DEPT VISIT HI MDM: CPT

## 2025-01-21 PROCEDURE — 0241U HB NFCT DS VIR RESP RNA 4 TRGT: CPT | Performed by: EMERGENCY MEDICINE

## 2025-01-21 PROCEDURE — 93005 ELECTROCARDIOGRAM TRACING: CPT

## 2025-01-21 PROCEDURE — 80053 COMPREHEN METABOLIC PANEL: CPT | Performed by: EMERGENCY MEDICINE

## 2025-01-21 PROCEDURE — 84484 ASSAY OF TROPONIN QUANT: CPT | Performed by: EMERGENCY MEDICINE

## 2025-01-21 PROCEDURE — 85025 COMPLETE CBC W/AUTO DIFF WBC: CPT | Performed by: EMERGENCY MEDICINE

## 2025-01-21 PROCEDURE — 83735 ASSAY OF MAGNESIUM: CPT | Performed by: EMERGENCY MEDICINE

## 2025-01-22 NOTE — ED PROVIDER NOTES
Time reflects when diagnosis was documented in both MDM as applicable and the Disposition within this note       Time User Action Codes Description Comment    1/21/2025  8:37 PM Hernandez Wilkins Add [R42] Lightheadedness     1/21/2025  8:37 PM Hernandez Wilkins Add [R05.9] Cough           ED Disposition       ED Disposition   Discharge    Condition   Stable    Date/Time   Tue Jan 21, 2025  8:37 PM    Comment   Sarah Bautista discharge to home/self care.                   Assessment & Plan       Medical Decision Making  84-year-old female presented to the emergency department for evaluation of lightheadedness.  On arrival patient awake, alert and in no acute distress.  Initial vital signs unremarkable.  Physical exam was benign.  EKG ordered to evaluate for acute ischemia/arrhythmia.  EKG on my interpretation with a sinus rhythm with no acute ischemic changes.  Chest x-ray ordered to evaluate for acute cardiopulmonary process including but not limited to pneumonia, pneumothorax, edema, effusion.  Chest x-ray on my independent interpretation with no acute findings.  Blood work done in the emergency department reassuring including a delta troponin within normal limits.  Patient able to ambulate in the emergency department without feeling lightheaded.  All diagnostic studies were discussed with the patient in detail.  Repeat exam continued to be benign.  Further treatment and diagnostic options were discussed with the patient including admitting for observation but the patient declined.  Recommendation was made for the patient to follow-up with her PCP.  Return precautions were discussed.    The patient (and/or family present) agrees with the plan for discharge and feels comfortable to go home with proper follow-up. Diagnostic tests were reviewed. Diagnosis, care plan and treatment options were discussed. All questions were answered prior to discharge. I considered the patient's other medical conditions as  applicable/noted above in my medical decision making. Advised the patient to return for worsening or additional problems. The patient (and/or family present) verbalized understanding of the discharge instructions and warnings that would necessitate return to the Emergency Department.          Amount and/or Complexity of Data Reviewed  Labs: ordered.  Radiology: ordered and independent interpretation performed.  ECG/medicine tests: ordered and independent interpretation performed.             Medications - No data to display    ED Risk Strat Scores                          SBIRT 22yo+      Flowsheet Row Most Recent Value   Initial Alcohol Screen: US AUDIT-C     1. How often do you have a drink containing alcohol? 0 Filed at: 01/21/2025 1714   2. How many drinks containing alcohol do you have on a typical day you are drinking?  0 Filed at: 01/21/2025 1714   3b. FEMALE Any Age, or MALE 65+: How often do you have 4 or more drinks on one occassion? 0 Filed at: 01/21/2025 1714   Audit-C Score 0 Filed at: 01/21/2025 1714   ED: How many times in the past year have you...    Used an illegal drug or used a prescription medication for non-medical reasons? Never Filed at: 01/21/2025 1714                            History of Present Illness       Chief Complaint   Patient presents with    Dizziness     Patient arrived via EMS, per ems family called when patient stood up and felt lightheaded no syncope. EMS reports patient was ambulating around residence pta. Cough x2 days denies fevers N/V/D        Past Medical History:   Diagnosis Date    Anxiety     Back pain     Disease of thyroid gland     Diverticulitis     Diverticulosis     Fibroid     GERD (gastroesophageal reflux disease)     Heart murmur     History of mammogram 11/01/2017    Hypertension     Hypothyroidism     Osteoporosis     Shortness of breath       Past Surgical History:   Procedure Laterality Date    APPENDECTOMY      COLONOSCOPY  2013    EYE SURGERY Right  07/05/2017    catarect surgery     EYE SURGERY Left 05/24/2017    catarect surgery     HEMORRHOID SURGERY      MAMMO (HISTORICAL)  11/01/2017    THYROID SURGERY      TOTAL ABDOMINAL HYSTERECTOMY      TUBAL LIGATION        Family History   Problem Relation Age of Onset    Hypertension Father     Asthma Son     Arrhythmia Son       Social History     Tobacco Use    Smoking status: Former     Types: Cigarettes     Passive exposure: Past    Smokeless tobacco: Never    Tobacco comments:     as per Elida   Vaping Use    Vaping status: Never Used   Substance Use Topics    Alcohol use: No     Comment: rarely    Drug use: No      E-Cigarette/Vaping    E-Cigarette Use Never User       E-Cigarette/Vaping Substances    Nicotine No     THC No     CBD No     Flavoring No     Other No     Unknown No       I have reviewed and agree with the history as documented.     84-year-old female presents to the emergency department for evaluation of lightheadedness.  Patient reports that earlier today she had an episode where she stood up from a seated position and briefly felt lightheaded.  She states that she did not pass out or fall to the ground.  Reports that afterwards she was able to ambulate without difficulty.  Patient states that a family member called EMS so she can be evaluated in the hospital.  Patient reports having a mild cough over the past 2 days but states that she has otherwise been feeling well.  She denies feeling lightheaded or dizzy now.  No headache, blurry vision, fever, chills, nausea, vomiting, diarrhea, shortness of breath, chest pain, abdominal pain, urinary symptoms or localized numbness, tingling or weakness.  The patient has not been taking any medications to treat her symptoms.        Review of Systems   Constitutional:  Negative for chills and fever.   HENT:  Negative for ear pain and sore throat.    Eyes:  Negative for pain and visual disturbance.   Respiratory:  Positive for cough. Negative for shortness  of breath.    Cardiovascular:  Negative for chest pain and palpitations.   Gastrointestinal:  Negative for abdominal pain and vomiting.   Genitourinary:  Negative for dysuria and hematuria.   Musculoskeletal:  Negative for arthralgias and back pain.   Skin:  Negative for color change and rash.   Neurological:  Positive for light-headedness. Negative for seizures and syncope.   All other systems reviewed and are negative.          Objective       ED Triage Vitals [01/21/25 1639]   Temperature Pulse Blood Pressure Respirations SpO2 Patient Position - Orthostatic VS   98 °F (36.7 °C) 75 141/66 18 98 % Lying      Temp Source Heart Rate Source BP Location FiO2 (%) Pain Score    Oral Monitor Right arm -- No Pain      Vitals      Date and Time Temp Pulse SpO2 Resp BP Pain Score FACES Pain Rating User   01/21/25 1709 -- 86 99 % 20 143/63 -- -- DU   01/21/25 1708 -- 81 100 % 20 150/67 -- -- DU   01/21/25 1707 -- 78 99 % 20 150/72 -- -- DU   01/21/25 1639 98 °F (36.7 °C) 75 98 % 18 141/66 No Pain -- DU            Physical Exam  Vitals and nursing note reviewed.   Constitutional:       General: She is not in acute distress.     Appearance: She is well-developed.   HENT:      Head: Normocephalic and atraumatic.   Eyes:      Extraocular Movements: Extraocular movements intact.      Conjunctiva/sclera: Conjunctivae normal.      Pupils: Pupils are equal, round, and reactive to light.   Cardiovascular:      Rate and Rhythm: Normal rate and regular rhythm.      Pulses:           Radial pulses are 2+ on the right side and 2+ on the left side.        Dorsalis pedis pulses are 2+ on the right side and 2+ on the left side.   Pulmonary:      Effort: Pulmonary effort is normal. No respiratory distress.      Breath sounds: Normal breath sounds.   Abdominal:      Palpations: Abdomen is soft.      Tenderness: There is no abdominal tenderness.   Musculoskeletal:         General: No swelling.      Cervical back: Neck supple.      Right lower  leg: No edema.      Left lower leg: No edema.   Skin:     General: Skin is warm and dry.      Capillary Refill: Capillary refill takes less than 2 seconds.   Neurological:      Mental Status: She is alert and oriented to person, place, and time.      GCS: GCS eye subscore is 4. GCS verbal subscore is 5. GCS motor subscore is 6.      Cranial Nerves: Cranial nerves 2-12 are intact.      Sensory: Sensation is intact.      Motor: Motor function is intact.      Coordination: Coordination is intact.      Gait: Gait is intact.   Psychiatric:         Mood and Affect: Mood normal.         Results Reviewed       Procedure Component Value Units Date/Time    HS Troponin I 2hr [[id number]]  (Normal) Collected: 01/21/25 1911    Lab Status: Final result Specimen: Blood from Arm, Left Updated: 01/21/25 2035     hs TnI 2hr 4 ng/L      Delta 2hr hsTnI 0 ng/L     HS Troponin I 4hr [[id number]]     Lab Status: No result Specimen: Blood     HS Troponin 0hr (reflex protocol) [[id number]]  (Normal) Collected: 01/21/25 1711    Lab Status: Final result Specimen: Blood from Arm, Left Updated: 01/21/25 1801     hs TnI 0hr 4 ng/L     FLU/RSV/COVID - if FLU/RSV clinically relevant (2hr TAT) [[id number]]  (Normal) Collected: 01/21/25 1711    Lab Status: Final result Specimen: Nares from Nose Updated: 01/21/25 1756     SARS-CoV-2 Negative     INFLUENZA A PCR Negative     INFLUENZA B PCR Negative     RSV PCR Negative    Narrative:      This test has been performed using the CoV-2/Flu/RSV plus assay on the [employer] GeneXpert platform. This test has been validated by the  and verified by the performing laboratory.     This test is designed to amplify and detect the following: nucleocapsid (N), envelope (E), and RNA-dependent RNA polymerase (RdRP) genes of the SARS-CoV-2 genome; matrix (M), basic polymerase (PB2), and acidic protein (PA) segments of the influenza A genome; matrix (M) and non-structural protein (NS) segments of the influenza B  genome, and the nucleocapsid genes of RSV A and RSV B.     Positive results are indicative of the presence of Flu A, Flu B, RSV, and/or SARS-CoV-2 RNA. Positive results for SARS-CoV-2 or suspected novel influenza should be reported to state, local, or federal health departments according to local reporting requirements.      All results should be assessed in conjunction with clinical presentation and other laboratory markers for clinical management.     FOR PEDIATRIC PATIENTS - copy/paste COVID Guidelines URL to browser: [email or web address]       Comprehensive metabolic panel [[id number]]  (Abnormal) Collected: 01/21/25 1711    Lab Status: Final result Specimen: Blood from Arm, Left Updated: 01/21/25 1734     Sodium 138 mmol/L      Potassium 3.7 mmol/L      Chloride 103 mmol/L      CO2 26 mmol/L      ANION GAP 9 mmol/L      BUN 16 mg/dL      Creatinine 1.16 mg/dL      Glucose 106 mg/dL      Calcium 9.3 mg/dL      AST 11 U/L      ALT 7 U/L      Alkaline Phosphatase 60 U/L      Total Protein 6.9 g/dL      Albumin 4.0 g/dL      Total Bilirubin 0.70 mg/dL      eGFR 43 ml/min/1.73sq m     Narrative:      National Kidney Disease Foundation guidelines for Chronic Kidney Disease (CKD):     Stage 1 with normal or high GFR (GFR > 90 mL/min/1.73 square meters)    Stage 2 Mild CKD (GFR = 60-89 mL/min/1.73 square meters)    Stage 3A Moderate CKD (GFR = 45-59 mL/min/1.73 square meters)    Stage 3B Moderate CKD (GFR = 30-44 mL/min/1.73 square meters)    Stage 4 Severe CKD (GFR = 15-29 mL/min/1.73 square meters)    Stage 5 End Stage CKD (GFR <15 mL/min/1.73 square meters)  Note: GFR calculation is accurate only with a steady state creatinine    Magnesium [[id number]]  (Normal) Collected: 01/21/25 1711    Lab Status: Final result Specimen: Blood from Arm, Left Updated: 01/21/25 1734     Magnesium 2.3 mg/dL     CBC and differential [[id number]] Collected: 01/21/25 1711    Lab  Status: Final result Specimen: Blood from Arm, Left Updated: 01/21/25 1718     WBC 6.11 Thousand/uL      RBC 4.35 Million/uL      Hemoglobin 13.3 g/dL      Hematocrit 40.5 %      MCV 93 fL      MCH 30.6 pg      MCHC 32.8 g/dL      RDW 13.3 %      MPV 8.9 fL      Platelets 288 Thousands/uL      nRBC 0 /100 WBCs      Segmented % 69 %      Immature Grans % 0 %      Lymphocytes % 21 %      Monocytes % 9 %      Eosinophils Relative 0 %      Basophils Relative 1 %      Absolute Neutrophils 4.20 Thousands/µL      Absolute Immature Grans 0.02 Thousand/uL      Absolute Lymphocytes 1.30 Thousands/µL      Absolute Monocytes 0.52 Thousand/µL      Eosinophils Absolute 0.02 Thousand/µL      Basophils Absolute 0.05 Thousands/µL             XR chest 2 views   ED Interpretation by Hernandez Wilkins MD (01/21 1812)   No pneumothorax.  No focal consolidation.  No significant edema or effusion.          ECG 12 Lead Documentation Only    Date/Time: 1/21/2025 8:02 PM    Performed by: Hernandez Wilkins MD  Authorized by: Hernandez Wilkins MD    ECG reviewed by me, the ED Provider: yes    Patient location:  ED  Previous ECG:     Previous ECG:  Unavailable    Comparison to cardiac monitor: Yes    Interpretation:     Interpretation: abnormal    Rate:     ECG rate assessment: normal    Rhythm:     Rhythm: sinus rhythm    Ectopy:     Ectopy: PVCs    QRS:     QRS axis:  Normal  Conduction:     Conduction: normal    ST segments:     ST segments:  Normal  T waves:     T waves: normal        ED Medication and Procedure Management   Prior to Admission Medications   Prescriptions Last Dose Informant Patient Reported? Taking?   ALPRAZolam (XANAX) 0.5 mg tablet   No No   Sig: Take 1 tablet (0.5 mg total) by mouth 2 (two) times a day as needed for anxiety   Calcium Carbonate-Vit D-Min (CALTRATE 600+D PLUS MINERALS) 600-800 MG-UNIT TABS  Self Yes No   Sig: Take by mouth daily     Cholecalciferol (VITAMIN D3) 1000 units CAPS  Self Yes No   Sig:  Take by mouth daily     Patient not taking: Reported on 6/14/2024   MAGNESIUM PO  Self Yes No   Sig: Take by mouth daily     Multiple Vitamins-Minerals (PreserVision AREDS 2) CAPS  Self Yes No   Sig: Take by mouth   aspirin (ECOTRIN LOW STRENGTH) 81 mg EC tablet  Self No No   Sig: Take 1 tablet (81 mg total) by mouth daily   atorvastatin (LIPITOR) 20 mg tablet   No No   Sig: Take 1 tablet (20 mg total) by mouth daily   levothyroxine 100 mcg tablet   No No   Sig: TAKE ONE TABLET BY MOUTH MONDAY,WEDNESDAY, AND FRIDAY   levothyroxine 88 mcg tablet   No No   Sig: TAKE 1 TABLET BY MOUTH IN THE AM TUE, THUR, SAT AND SUN   metoprolol tartrate (LOPRESSOR) 25 mg tablet   No No   Sig: TAKE 1/2 OF A TABLET (12.5 MG TOTAL) BY MOUTH TWICE A DAY      Facility-Administered Medications: None     Patient's Medications   Discharge Prescriptions    No medications on file     No discharge procedures on file.  ED SEPSIS DOCUMENTATION   Time reflects when diagnosis was documented in both MDM as applicable and the Disposition within this note       Time User Action Codes Description Comment    1/21/2025  8:37 PM Hernandez Wilkins [R42] Lightheadedness     1/21/2025  8:37 PM Hernandez Wilkins [R05.9] Cough                  Hernandez Wilkins MD  01/21/25 2045

## 2025-01-24 LAB
ATRIAL RATE: 74 BPM
P AXIS: 69 DEGREES
PR INTERVAL: 122 MS
QRS AXIS: 72 DEGREES
QRSD INTERVAL: 78 MS
QT INTERVAL: 418 MS
QTC INTERVAL: 463 MS
T WAVE AXIS: 52 DEGREES
VENTRICULAR RATE: 74 BPM

## 2025-01-24 PROCEDURE — 93010 ELECTROCARDIOGRAM REPORT: CPT | Performed by: INTERNAL MEDICINE

## 2025-01-29 ENCOUNTER — TELEPHONE (OUTPATIENT)
Dept: FAMILY MEDICINE CLINIC | Facility: CLINIC | Age: 85
End: 2025-01-29

## 2025-01-29 ENCOUNTER — OFFICE VISIT (OUTPATIENT)
Dept: FAMILY MEDICINE CLINIC | Facility: CLINIC | Age: 85
End: 2025-01-29
Payer: MEDICARE

## 2025-01-29 VITALS
OXYGEN SATURATION: 99 % | HEIGHT: 58 IN | BODY MASS INDEX: 28.97 KG/M2 | SYSTOLIC BLOOD PRESSURE: 122 MMHG | WEIGHT: 138 LBS | RESPIRATION RATE: 16 BRPM | HEART RATE: 70 BPM | TEMPERATURE: 98.1 F | DIASTOLIC BLOOD PRESSURE: 74 MMHG

## 2025-01-29 DIAGNOSIS — I25.10 CORONARY ARTERY DISEASE INVOLVING NATIVE CORONARY ARTERY OF NATIVE HEART WITHOUT ANGINA PECTORIS: ICD-10-CM

## 2025-01-29 DIAGNOSIS — E03.9 HYPOTHYROIDISM, UNSPECIFIED TYPE: ICD-10-CM

## 2025-01-29 DIAGNOSIS — E78.2 MIXED HYPERLIPIDEMIA: ICD-10-CM

## 2025-01-29 DIAGNOSIS — E78.5 HYPERLIPIDEMIA, UNSPECIFIED HYPERLIPIDEMIA TYPE: ICD-10-CM

## 2025-01-29 DIAGNOSIS — F41.9 ANXIETY: Primary | ICD-10-CM

## 2025-01-29 DIAGNOSIS — I10 ESSENTIAL HYPERTENSION: ICD-10-CM

## 2025-01-29 DIAGNOSIS — E03.8 OTHER SPECIFIED HYPOTHYROIDISM: ICD-10-CM

## 2025-01-29 DIAGNOSIS — N18.31 STAGE 3A CHRONIC KIDNEY DISEASE (HCC): ICD-10-CM

## 2025-01-29 PROCEDURE — G2211 COMPLEX E/M VISIT ADD ON: HCPCS | Performed by: FAMILY MEDICINE

## 2025-01-29 PROCEDURE — 99214 OFFICE O/P EST MOD 30 MIN: CPT | Performed by: FAMILY MEDICINE

## 2025-01-29 RX ORDER — LEVOTHYROXINE SODIUM 100 UG/1
TABLET ORAL
Start: 2025-01-29

## 2025-01-29 RX ORDER — ATORVASTATIN CALCIUM 20 MG/1
20 TABLET, FILM COATED ORAL DAILY
Qty: 90 TABLET | Refills: 3 | Status: SHIPPED | OUTPATIENT
Start: 2025-01-29

## 2025-01-29 RX ORDER — LEVOTHYROXINE SODIUM 88 UG/1
TABLET ORAL
Qty: 48 TABLET | Refills: 3 | Status: SHIPPED | OUTPATIENT
Start: 2025-01-29

## 2025-01-29 RX ORDER — ALPRAZOLAM 0.5 MG
0.5 TABLET ORAL 2 TIMES DAILY PRN
Qty: 180 TABLET | Refills: 5 | Status: SHIPPED | OUTPATIENT
Start: 2025-01-29

## 2025-01-29 NOTE — PROGRESS NOTES
Name: Sarah Bautista      : 1940      MRN: [id number]  Encounter Provider: Brett Conde MD  Encounter Date: 2025   Encounter department: [facility]  :  Assessment & Plan  Anxiety  Patient to continue utilizing medical therapy as well as counseling sources as applicable to condition. If suicidal thought or fear of suicide attempt, to call 911 and seek help immediately. Medications and therapy reviewed today and all patient  questions answered today.   Orders:  •  ALPRAZolam (XANAX) 0.5 mg tablet; Take 1 tablet (0.5 mg total) by mouth 2 (two) times a day as needed for anxiety    Mixed hyperlipidemia  Patient  is stable with current medication and we discussed a low fat low cholesterol diet. Weight loss also discussed for this will help lower cholesterol also. Recheck lipids in 6 months.   Orders:  •  levothyroxine 88 mcg tablet; TAKE 1 TABLET BY MOUTH IN THE AM TUE, THUR, SAT AND SUN  •  levothyroxine 100 mcg tablet; TAKE ONE TABLET BY MOUTH MONDAY,WEDNESDAY, AND FRIDAY    Coronary artery disease involving native coronary artery of native heart without angina pectoris  Patient to continue  with current cardiac meds to decrease risk of re stenosis. Patient to follow up with cardiology for scheduled appointments to decrease risk for further cardiac problems with appropriate diagnostic testing to reassess cardiac status. Patient had alll questions about this problem answered today.        Other specified hypothyroidism  Patient to continue current dose of thyroid medicine and recheck TSH in 6 months        Stage 3a chronic kidney disease (HCC)  Lab Results   Component Value Date    EGFR 43 2025    EGFR 49 (L) 2023    EGFR 57 (L) 2023    CREATININE 1.16 2025    CREATININE 1.12 (H) 2023    CREATININE 0.98 (H) 2023   Pt to avoid NSAIDs and any IV dyes. Patient to follow up eoither with nephrology or  with us for  further  monitoring of   "renal function.        Hyperlipidemia, unspecified hyperlipidemia type  Patient  is stable with current medication and we discussed a low fat low cholesterol diet. Weight loss also discussed for this will help lower cholesterol also. Recheck lipids in 6 months.   Orders:  •  atorvastatin (LIPITOR) 20 mg tablet; Take 1 tablet (20 mg total) by mouth daily  •  Comprehensive metabolic panel; Future  •  Lipid Panel with Direct LDL reflex; Future    Essential hypertension    Orders:  •  levothyroxine 88 mcg tablet; TAKE 1 TABLET BY MOUTH IN THE AM TUE, THUR, SAT AND SUN  •  levothyroxine 100 mcg tablet; TAKE ONE TABLET BY MOUTH MONDAY,WEDNESDAY, AND FRIDAY    Hypothyroidism, unspecified type    Orders:  •  levothyroxine 100 mcg tablet; TAKE ONE TABLET BY MOUTH MONDAY,WEDNESDAY, AND FRIDAY  •  TSH, 3rd generation with Free T4 reflex; Future          Falls Plan of Care: balance, strength, and gait training instructions were provided. Home safety education provided.     History of Present Illness   HPI  Review of Systems    Objective   /74 (BP Location: Left arm, Patient Position: Sitting, Cuff Size: Standard)   Pulse 70   Temp 98.1 °F (36.7 °C)   Resp 16   Ht 4' 10\" (1.473 m)   Wt 62.6 kg (138 lb)   LMP  (LMP Unknown)   SpO2 99%   BMI 28.84 kg/m²      Physical Exam    "

## 2025-01-29 NOTE — ASSESSMENT & PLAN NOTE
Lab Results   Component Value Date    EGFR 43 01/21/2025    EGFR 49 (L) 06/26/2023    EGFR 57 (L) 02/03/2023    CREATININE 1.16 01/21/2025    CREATININE 1.12 (H) 06/26/2023    CREATININE 0.98 (H) 02/03/2023   Pt to avoid NSAIDs and any IV dyes. Patient to follow up eoither with nephrology or  with us for  further  monitoring of  renal function.      
Patient  is stable with current medication and we discussed a low fat low cholesterol diet. Weight loss also discussed for this will help lower cholesterol also. Recheck lipids in 6 months.   Orders:  •  atorvastatin (LIPITOR) 20 mg tablet; Take 1 tablet (20 mg total) by mouth daily  •  Comprehensive metabolic panel; Future  •  Lipid Panel with Direct LDL reflex; Future  
Patient  is stable with current medication and we discussed a low fat low cholesterol diet. Weight loss also discussed for this will help lower cholesterol also. Recheck lipids in 6 months.   Orders:  •  levothyroxine 88 mcg tablet; TAKE 1 TABLET BY MOUTH IN THE AM TUE, THUR, SAT AND SUN  •  levothyroxine 100 mcg tablet; TAKE ONE TABLET BY MOUTH MONDAY,WEDNESDAY, AND FRIDAY  
Patient to continue  with current cardiac meds to decrease risk of re stenosis. Patient to follow up with cardiology for scheduled appointments to decrease risk for further cardiac problems with appropriate diagnostic testing to reassess cardiac status. Patient had alll questions about this problem answered today.      
Patient to continue current dose of thyroid medicine and recheck TSH in 6 months      
Patient to continue utilizing medical therapy as well as counseling sources as applicable to condition. If suicidal thought or fear of suicide attempt, to call 911 and seek help immediately. Medications and therapy reviewed today and all patient  questions answered today.   Orders:  •  ALPRAZolam (XANAX) 0.5 mg tablet; Take 1 tablet (0.5 mg total) by mouth 2 (two) times a day as needed for anxiety  
no weight-bearing restrictions

## 2025-03-29 ENCOUNTER — APPOINTMENT (EMERGENCY)
Dept: RADIOLOGY | Facility: HOSPITAL | Age: 85
End: 2025-03-29
Payer: MEDICARE

## 2025-03-29 ENCOUNTER — APPOINTMENT (EMERGENCY)
Dept: CT IMAGING | Facility: HOSPITAL | Age: 85
End: 2025-03-29
Payer: MEDICARE

## 2025-03-29 ENCOUNTER — HOSPITAL ENCOUNTER (INPATIENT)
Facility: HOSPITAL | Age: 85
LOS: 2 days | Discharge: HOME | End: 2025-03-31
Attending: INTERNAL MEDICINE | Admitting: INTERNAL MEDICINE
Payer: MEDICARE

## 2025-03-29 DIAGNOSIS — R41.82 ALTERED MENTAL STATUS, UNSPECIFIED ALTERED MENTAL STATUS TYPE: Primary | ICD-10-CM

## 2025-03-29 DIAGNOSIS — N30.00 ACUTE CYSTITIS WITHOUT HEMATURIA: ICD-10-CM

## 2025-03-29 DIAGNOSIS — E03.9 HYPOTHYROIDISM, UNSPECIFIED TYPE: ICD-10-CM

## 2025-03-29 DIAGNOSIS — U07.1 COVID-19: ICD-10-CM

## 2025-03-29 PROBLEM — R82.90 ABNORMAL URINALYSIS: Status: ACTIVE | Noted: 2025-03-29

## 2025-03-29 PROBLEM — I10 HYPERTENSION: Status: ACTIVE | Noted: 2025-03-29

## 2025-03-29 PROBLEM — G93.41 METABOLIC ENCEPHALOPATHY: Status: ACTIVE | Noted: 2025-03-29

## 2025-03-29 LAB
2HR DELTA HS TROPONIN: 0 NG/L
ALBUMIN SERPL BCG-MCNC: 4.2 G/DL (ref 3.5–5)
ALP SERPL-CCNC: 79 U/L (ref 34–104)
ALT SERPL W P-5'-P-CCNC: 9 U/L (ref 7–52)
AMPHETAMINES SERPL QL SCN: NEGATIVE
ANION GAP SERPL CALCULATED.3IONS-SCNC: 10 MMOL/L (ref 4–13)
AST SERPL W P-5'-P-CCNC: 12 U/L (ref 13–39)
BACTERIA UR QL AUTO: ABNORMAL /HPF
BARBITURATES UR QL: NEGATIVE
BASOPHILS # BLD AUTO: 0.05 THOUSANDS/ÂΜL (ref 0–0.1)
BASOPHILS NFR BLD AUTO: 1 % (ref 0–1)
BENZODIAZ UR QL: POSITIVE
BILIRUB SERPL-MCNC: 0.68 MG/DL (ref 0.2–1)
BILIRUB UR QL STRIP: NEGATIVE
BUN SERPL-MCNC: 12 MG/DL (ref 5–25)
CALCIUM SERPL-MCNC: 9.2 MG/DL (ref 8.4–10.2)
CARDIAC TROPONIN I PNL SERPL HS: 4 NG/L (ref ?–50)
CARDIAC TROPONIN I PNL SERPL HS: 4 NG/L (ref ?–50)
CHLORIDE SERPL-SCNC: 102 MMOL/L (ref 96–108)
CLARITY UR: CLEAR
CO2 SERPL-SCNC: 24 MMOL/L (ref 21–32)
COCAINE UR QL: NEGATIVE
COLOR UR: YELLOW
CREAT SERPL-MCNC: 0.98 MG/DL (ref 0.6–1.3)
EOSINOPHIL # BLD AUTO: 0 THOUSAND/ÂΜL (ref 0–0.61)
EOSINOPHIL NFR BLD AUTO: 0 % (ref 0–6)
ERYTHROCYTE [DISTWIDTH] IN BLOOD BY AUTOMATED COUNT: 12.9 % (ref 11.6–15.1)
FENTANYL UR QL SCN: NEGATIVE
FLUAV AG UPPER RESP QL IA.RAPID: NEGATIVE
FLUBV AG UPPER RESP QL IA.RAPID: NEGATIVE
GFR SERPL CREATININE-BSD FRML MDRD: 52 ML/MIN/1.73SQ M
GLUCOSE SERPL-MCNC: 111 MG/DL (ref 65–140)
GLUCOSE UR STRIP-MCNC: NEGATIVE MG/DL
HCT VFR BLD AUTO: 39.6 % (ref 34.8–46.1)
HGB BLD-MCNC: 13 G/DL (ref 11.5–15.4)
HGB UR QL STRIP.AUTO: ABNORMAL
HYDROCODONE UR QL SCN: NEGATIVE
IMM GRANULOCYTES # BLD AUTO: 0.03 THOUSAND/UL (ref 0–0.2)
IMM GRANULOCYTES NFR BLD AUTO: 0 % (ref 0–2)
KETONES UR STRIP-MCNC: NEGATIVE MG/DL
LEUKOCYTE ESTERASE UR QL STRIP: ABNORMAL
LYMPHOCYTES # BLD AUTO: 0.43 THOUSANDS/ÂΜL (ref 0.6–4.47)
LYMPHOCYTES NFR BLD AUTO: 5 % (ref 14–44)
MCH RBC QN AUTO: 31 PG (ref 26.8–34.3)
MCHC RBC AUTO-ENTMCNC: 32.8 G/DL (ref 31.4–37.4)
MCV RBC AUTO: 94 FL (ref 82–98)
METHADONE UR QL: NEGATIVE
MONOCYTES # BLD AUTO: 0.59 THOUSAND/ÂΜL (ref 0.17–1.22)
MONOCYTES NFR BLD AUTO: 7 % (ref 4–12)
MUCOUS THREADS UR QL AUTO: ABNORMAL
NEUTROPHILS # BLD AUTO: 7.37 THOUSANDS/ÂΜL (ref 1.85–7.62)
NEUTS SEG NFR BLD AUTO: 87 % (ref 43–75)
NITRITE UR QL STRIP: NEGATIVE
NON-SQ EPI CELLS URNS QL MICRO: ABNORMAL /HPF
NRBC BLD AUTO-RTO: 0 /100 WBCS
OPIATES UR QL SCN: NEGATIVE
OTHER STN SPEC: ABNORMAL
OXYCODONE+OXYMORPHONE UR QL SCN: NEGATIVE
PCP UR QL: NEGATIVE
PH UR STRIP.AUTO: 6 [PH]
PLATELET # BLD AUTO: 320 THOUSANDS/UL (ref 149–390)
PMV BLD AUTO: 8.9 FL (ref 8.9–12.7)
POTASSIUM SERPL-SCNC: 3.7 MMOL/L (ref 3.5–5.3)
PROT SERPL-MCNC: 7 G/DL (ref 6.4–8.4)
PROT UR STRIP-MCNC: ABNORMAL MG/DL
RBC # BLD AUTO: 4.2 MILLION/UL (ref 3.81–5.12)
RBC #/AREA URNS AUTO: ABNORMAL /HPF
SARS-COV+SARS-COV-2 AG RESP QL IA.RAPID: POSITIVE
SODIUM SERPL-SCNC: 136 MMOL/L (ref 135–147)
SP GR UR STRIP.AUTO: >=1.03 (ref 1–1.03)
THC UR QL: NEGATIVE
TSH SERPL DL<=0.05 MIU/L-ACNC: 12.49 UIU/ML (ref 0.45–4.5)
UROBILINOGEN UR QL STRIP.AUTO: 0.2 E.U./DL
WBC # BLD AUTO: 8.47 THOUSAND/UL (ref 4.31–10.16)
WBC #/AREA URNS AUTO: ABNORMAL /HPF

## 2025-03-29 PROCEDURE — 93005 ELECTROCARDIOGRAM TRACING: CPT

## 2025-03-29 PROCEDURE — 80053 COMPREHEN METABOLIC PANEL: CPT | Performed by: INTERNAL MEDICINE

## 2025-03-29 PROCEDURE — 99285 EMERGENCY DEPT VISIT HI MDM: CPT

## 2025-03-29 PROCEDURE — 71045 X-RAY EXAM CHEST 1 VIEW: CPT

## 2025-03-29 PROCEDURE — 96361 HYDRATE IV INFUSION ADD-ON: CPT

## 2025-03-29 PROCEDURE — 96360 HYDRATION IV INFUSION INIT: CPT

## 2025-03-29 PROCEDURE — 81001 URINALYSIS AUTO W/SCOPE: CPT | Performed by: INTERNAL MEDICINE

## 2025-03-29 PROCEDURE — 84439 ASSAY OF FREE THYROXINE: CPT

## 2025-03-29 PROCEDURE — 84443 ASSAY THYROID STIM HORMONE: CPT | Performed by: INTERNAL MEDICINE

## 2025-03-29 PROCEDURE — 80307 DRUG TEST PRSMV CHEM ANLYZR: CPT | Performed by: INTERNAL MEDICINE

## 2025-03-29 PROCEDURE — 87811 SARS-COV-2 COVID19 W/OPTIC: CPT | Performed by: INTERNAL MEDICINE

## 2025-03-29 PROCEDURE — 84484 ASSAY OF TROPONIN QUANT: CPT | Performed by: INTERNAL MEDICINE

## 2025-03-29 PROCEDURE — 99285 EMERGENCY DEPT VISIT HI MDM: CPT | Performed by: INTERNAL MEDICINE

## 2025-03-29 PROCEDURE — 99223 1ST HOSP IP/OBS HIGH 75: CPT

## 2025-03-29 PROCEDURE — 85025 COMPLETE CBC W/AUTO DIFF WBC: CPT | Performed by: INTERNAL MEDICINE

## 2025-03-29 PROCEDURE — 70450 CT HEAD/BRAIN W/O DYE: CPT

## 2025-03-29 PROCEDURE — 87804 INFLUENZA ASSAY W/OPTIC: CPT | Performed by: INTERNAL MEDICINE

## 2025-03-29 PROCEDURE — 36415 COLL VENOUS BLD VENIPUNCTURE: CPT | Performed by: INTERNAL MEDICINE

## 2025-03-29 RX ORDER — SODIUM CHLORIDE, SODIUM GLUCONATE, SODIUM ACETATE, POTASSIUM CHLORIDE, MAGNESIUM CHLORIDE, SODIUM PHOSPHATE, DIBASIC, AND POTASSIUM PHOSPHATE .53; .5; .37; .037; .03; .012; .00082 G/100ML; G/100ML; G/100ML; G/100ML; G/100ML; G/100ML; G/100ML
75 INJECTION, SOLUTION INTRAVENOUS CONTINUOUS
Status: DISCONTINUED | OUTPATIENT
Start: 2025-03-29 | End: 2025-03-30

## 2025-03-29 RX ORDER — ATORVASTATIN CALCIUM 20 MG/1
20 TABLET, FILM COATED ORAL
Status: DISCONTINUED | OUTPATIENT
Start: 2025-03-30 | End: 2025-03-31 | Stop reason: HOSPADM

## 2025-03-29 RX ORDER — LEVOTHYROXINE SODIUM 88 UG/1
88 TABLET ORAL
Status: DISCONTINUED | OUTPATIENT
Start: 2025-03-30 | End: 2025-03-29

## 2025-03-29 RX ORDER — CEFTRIAXONE 1 G/50ML
1000 INJECTION, SOLUTION INTRAVENOUS EVERY 24 HOURS
Status: DISCONTINUED | OUTPATIENT
Start: 2025-03-29 | End: 2025-03-31 | Stop reason: HOSPADM

## 2025-03-29 RX ORDER — LEVOTHYROXINE SODIUM 88 UG/1
88 TABLET ORAL
Status: DISCONTINUED | OUTPATIENT
Start: 2025-03-30 | End: 2025-03-30

## 2025-03-29 RX ORDER — SODIUM CHLORIDE, SODIUM LACTATE, POTASSIUM CHLORIDE, CALCIUM CHLORIDE 600; 310; 30; 20 MG/100ML; MG/100ML; MG/100ML; MG/100ML
75 INJECTION, SOLUTION INTRAVENOUS CONTINUOUS
Status: DISCONTINUED | OUTPATIENT
Start: 2025-03-29 | End: 2025-03-29

## 2025-03-29 RX ORDER — LEVOTHYROXINE SODIUM 100 UG/1
100 TABLET ORAL
Status: DISCONTINUED | OUTPATIENT
Start: 2025-03-31 | End: 2025-03-30

## 2025-03-29 RX ORDER — SODIUM CHLORIDE, SODIUM LACTATE, POTASSIUM CHLORIDE, CALCIUM CHLORIDE 600; 310; 30; 20 MG/100ML; MG/100ML; MG/100ML; MG/100ML
125 INJECTION, SOLUTION INTRAVENOUS CONTINUOUS
Status: DISCONTINUED | OUTPATIENT
Start: 2025-03-29 | End: 2025-03-29

## 2025-03-29 RX ORDER — HEPARIN SODIUM 5000 [USP'U]/ML
5000 INJECTION, SOLUTION INTRAVENOUS; SUBCUTANEOUS EVERY 8 HOURS SCHEDULED
Status: DISCONTINUED | OUTPATIENT
Start: 2025-03-29 | End: 2025-03-31 | Stop reason: HOSPADM

## 2025-03-29 RX ORDER — ACETAMINOPHEN 325 MG/1
975 TABLET ORAL EVERY 8 HOURS PRN
Status: DISCONTINUED | OUTPATIENT
Start: 2025-03-29 | End: 2025-03-31 | Stop reason: HOSPADM

## 2025-03-29 RX ORDER — ASPIRIN 81 MG/1
81 TABLET ORAL DAILY
Status: DISCONTINUED | OUTPATIENT
Start: 2025-03-30 | End: 2025-03-31 | Stop reason: HOSPADM

## 2025-03-29 RX ORDER — CALCIUM CARBONATE 500(1250)
1 TABLET ORAL
Status: DISCONTINUED | OUTPATIENT
Start: 2025-03-30 | End: 2025-03-31 | Stop reason: HOSPADM

## 2025-03-29 RX ORDER — ALPRAZOLAM 0.5 MG
0.5 TABLET ORAL 2 TIMES DAILY PRN
Status: DISCONTINUED | OUTPATIENT
Start: 2025-03-29 | End: 2025-03-30

## 2025-03-29 RX ADMIN — ACETAMINOPHEN 975 MG: 325 TABLET, FILM COATED ORAL at 21:08

## 2025-03-29 RX ADMIN — SODIUM CHLORIDE, SODIUM LACTATE, POTASSIUM CHLORIDE, AND CALCIUM CHLORIDE 125 ML/HR: .6; .31; .03; .02 INJECTION, SOLUTION INTRAVENOUS at 14:59

## 2025-03-29 RX ADMIN — CEFTRIAXONE 1000 MG: 1 INJECTION, SOLUTION INTRAVENOUS at 20:39

## 2025-03-29 RX ADMIN — Medication 12.5 MG: at 21:00

## 2025-03-29 RX ADMIN — HEPARIN SODIUM 5000 UNITS: 5000 INJECTION INTRAVENOUS; SUBCUTANEOUS at 21:01

## 2025-03-29 RX ADMIN — SODIUM CHLORIDE, SODIUM GLUCONATE, SODIUM ACETATE, POTASSIUM CHLORIDE, MAGNESIUM CHLORIDE, SODIUM PHOSPHATE, DIBASIC, AND POTASSIUM PHOSPHATE 75 ML/HR: .53; .5; .37; .037; .03; .012; .00082 INJECTION, SOLUTION INTRAVENOUS at 21:08

## 2025-03-29 NOTE — ASSESSMENT & PLAN NOTE
Lab Results   Component Value Date    EGFR 52 03/29/2025    EGFR 43 01/21/2025    EGFR 49 (L) 06/26/2023    CREATININE 0.98 03/29/2025    CREATININE 1.16 01/21/2025    CREATININE 1.12 (H) 06/26/2023   At baseline continue to trend

## 2025-03-29 NOTE — ASSESSMENT & PLAN NOTE
Patient brought in by son for AMS, lethargy, not acting herself.  In ED disoriented to time and place otherwise awake and alert.  Per discussion with her son patient has baseline confusion and lives with son who is her primary caretaker.  He feels that she is not too far off from her baseline.  Upon exam patient is alert to self and time disoriented to situation.  When asked why she was here she stated talk to Royer over there while no one was else was in the room..  CT head showing no acute hemorrhage.  Left pelvic hypodensity which may represent age-indeterminate infarct  CXR without acute cardiopulmonary findings  In ED tested positive for COVID-19  UDS positive for benzodiazepines  Suspect AMS in the setting of COVID vs dehydration vs UTI?.  Question underlying dementia/Alzheimer's?  As patient has confusion at baseline.  Plan  Frequent neurochecks  Reorient as needed  Regulate sleep/wake cycles  Would hold home dose Xanax until mentation improves  Continue treatment for COVID  Initiate on IV fluids  PT and OT cog eval

## 2025-03-29 NOTE — ASSESSMENT & PLAN NOTE
Presents with altered mental status found to be COVID-positive in the ED.  Currently asymptomatic not requiring any O2 supplementation. Not meeting SIRS  CXR without acute cardiopulmonary disease  Or on mild COVID pathway  Obtain COVID labs  Monitor respiratory status closely.  Hold off on remdesivir/antiviral given asymptomatic  Supportive care

## 2025-03-29 NOTE — ASSESSMENT & PLAN NOTE
On Xanax 0.5 mg twice daily  PDMP reviewed  Hold tonight's dose in the setting of AMS  And consider resuming when mentation improves

## 2025-03-29 NOTE — ASSESSMENT & PLAN NOTE
TSH 12.493 follow-up free T4.  Does have history of hemithyroidectomy  ED discussed with endocrinology who felt that this is less likely myxedema or severe hypothyroidism.  TSH is mildly elevated for her age.  Unclear patient has been consistent with taking her medication.  Continue levothyroxine   88 mcg Tuesday, Thursday, Saturday, Sunday  100 mcg Monday, Wednesday, Friday  Endocrinology recommending resuming home dose and rechecking later in the week  Follow-up on free T4

## 2025-03-29 NOTE — ED PROVIDER NOTES
Time reflects when diagnosis was documented in both MDM as applicable and the Disposition within this note       Time User Action Codes Description Comment    3/29/2025  6:30 PM Johnathan Hinds Add [R41.82] Altered mental status, unspecified altered mental status type     3/29/2025  6:30 PM Johnathan Hinds Add [E03.9] Hypothyroidism, unspecified type     3/29/2025  6:30 PM Johnathan Hinds Add [U07.1] COVID-19           ED Disposition       ED Disposition   Admit    Condition   Stable    Date/Time   Sat Mar 29, 2025  6:31 PM    Comment   Case was discussed with nisha and the patient's admission status was agreed to be Admission Status: inpatient status to the service of Dr. Shi.               Assessment & Plan       Medical Decision Making  This is an 84 years old brought by her son for having altered mental status.  The son stated that she is lethargic.  And she is not herself.  Patient stated that she feels weak.  Patient is awake alert but she is disoriented to time and place.  Patient denies headache, dizziness, CP, SOB, abdominal pain, nausea vomiting diarrhea.  Patient has history of anxiety and she is on Xanax 0.25 x 4 daily.  Patient has history of hypothyroidism and hemithyroidectomy many years ago.  Patient supposed to see a cardiologist but patient has no cardiac issues.  Physical exam pertinent for awake alert but disoriented to time and place.  Neurological exam no focal deficits.  Speech is normal.  Sensory is intact and motor is intact.  CT HEAD; No acute hemorrhage. Left thalamic hypodensity which might represent age-indeterminate infarct.  CXR no acute cardiopulmonary findings.  EKG NSR rate 95/min no ischemic changes.  Reviewing the labs including CBC CMP are within normal limits.  TSH is 12.49. UA ; NEGATIVE  EKG ; NSR rate 95/min, no ischemic changes  Tested for COVID/flu came back positive for COVID.  Pt has covid , and hypothyroidism   Case discussed with dr Emelia Urias and stated;  less likely myxedema or severe hypothyroidism. TSH is mildly elevated for her age (normal is likely 5-6) . collect free T4 level.   resume home dose of levothyroxine.   consider other metabolic derangement as cause for AMS (infectious, dehydration)   Then added; For now I would do daily. If you get a free t4 today, you can have it repeated in a few days to see if there's an improvement or over correction     With her confusion, I wouldn't know if she's even taking the levothyroxine and her home medication instruction says she is taking it every other day       Amount and/or Complexity of Data Reviewed  Labs: ordered.     Details:   Reviewing the labs including CBC CMP are within normal limits.  TSH is 12.49. UA ; NEGATIVE  Radiology: ordered and independent interpretation performed.     Details: CT HEAD; No acute hemorrhage. Left thalamic hypodensity which might represent age-indeterminate infarct.  CXR no acute cardiopulmonary findings.    ECG/medicine tests: ordered.     Details: EKG; NSR rate 95/min, no ischemic changes    Risk  Prescription drug management.  Decision regarding hospitalization.        ED Course as of 03/30/25 1138   Sat Mar 29, 2025   1425 .       Medications       ED Risk Strat Scores                            SBIRT 22yo+      Flowsheet Row Most Recent Value   Initial Alcohol Screen: US AUDIT-C     1. How often do you have a drink containing alcohol? 0 Filed at: 03/29/2025 1435   2. How many drinks containing alcohol do you have on a typical day you are drinking?  0 Filed at: 03/29/2025 1435   3b. FEMALE Any Age, or MALE 65+: How often do you have 4 or more drinks on one occassion? 0 Filed at: 03/29/2025 1435   Audit-C Score 0 Filed at: 03/29/2025 1435   ED: How many times in the past year have you...    Used an illegal drug or used a prescription medication for non-medical reasons? Never Filed at: 03/29/2025 1435                            History of Present Illness       Chief Complaint  "  Patient presents with    Fatigue     Pt states she woke up feeling weak and \"wiped out\" since waking up this morning. Took her daily Xanax at 10am (Takes0.25mg 4xd). Denies other symptoms and OTC PTa       Past Medical History:   Diagnosis Date    Anxiety     Back pain     Disease of thyroid gland     Diverticulitis     Diverticulosis     Fibroid     GERD (gastroesophageal reflux disease)     Heart murmur     History of mammogram 11/01/2017    Hypertension     Hypothyroidism     Osteoporosis     Shortness of breath       Past Surgical History:   Procedure Laterality Date    APPENDECTOMY      COLONOSCOPY  2013    EYE SURGERY Right 07/05/2017    catarect surgery     EYE SURGERY Left 05/24/2017    catarect surgery     HEMORRHOID SURGERY      MAMMO (HISTORICAL)  11/01/2017    THYROID SURGERY      TOTAL ABDOMINAL HYSTERECTOMY      TUBAL LIGATION        Family History   Problem Relation Age of Onset    Hypertension Father     Asthma Son     Arrhythmia Son       Social History     Tobacco Use    Smoking status: Former     Types: Cigarettes     Passive exposure: Past    Smokeless tobacco: Never    Tobacco comments:     as per Elida   Vaping Use    Vaping status: Never Used   Substance Use Topics    Alcohol use: Not Currently    Drug use: No      E-Cigarette/Vaping    E-Cigarette Use Never User       E-Cigarette/Vaping Substances    Nicotine No     THC No     CBD No     Flavoring No     Other No     Unknown No       I have reviewed and agree with the history as documented.     This is an 84 years old brought by her son as she feels weak.  Patient's son he stated that she has a change of her condition and this is not herself.  Patient's family also stated that she is lethargic.  Patient is awake alert disoriented to time and place.  Does not know why she is here but she feels weak.  Patient has history of cardiac issues but she does not take any medication.  Hypothyroidism.  Patient has history of thyroidectomy many years " ago.  Patient denies headache, dizziness, CP, SOB, abdominal pain, nausea vomiting diarrhea.  Patient did not take the flu vaccine this year.  Patient took her breakfast this morning.  No other history available from the patient.  Patient is on Xanax 0.25 mg and she take it 4 times a day and according to the son she took it today.  Patient on Xanax for a long time.      History provided by:  Patient   used: No    Fatigue  Severity:  Unable to specify  Onset quality:  Unable to specify  Associated symptoms: no abdominal pain, no arthralgias, no chest pain, no cough, no dysuria, no fever, no seizures, no shortness of breath and no vomiting        Review of Systems   Constitutional:  Positive for fatigue. Negative for chills and fever.   HENT:  Negative for ear pain and sore throat.    Eyes:  Negative for pain and visual disturbance.   Respiratory:  Negative for cough and shortness of breath.    Cardiovascular:  Negative for chest pain and palpitations.   Gastrointestinal:  Negative for abdominal pain and vomiting.   Genitourinary:  Negative for dysuria and hematuria.   Musculoskeletal:  Negative for arthralgias and back pain.   Skin:  Negative for color change and rash.   Neurological:  Negative for seizures and syncope.   All other systems reviewed and are negative.          Objective       ED Triage Vitals [03/29/25 1434]   Temperature Pulse Blood Pressure Respirations SpO2 Patient Position - Orthostatic VS   99.1 °F (37.3 °C) 101 131/60 16 94 % Lying      Temp Source Heart Rate Source BP Location FiO2 (%) Pain Score    Oral Monitor Right arm -- 1      Vitals      Date and Time Temp Pulse SpO2 Resp BP Pain Score FACES Pain Rating User   03/30/25 0920 -- -- -- -- -- No Pain -- NB   03/30/25 0718 97.5 °F (36.4 °C) 98 98 % 21 174/63 -- -- JI   03/29/25 2209 99.1 °F (37.3 °C) 85 95 % 18 141/74 -- -- JA   03/29/25 2108 -- -- -- -- -- 5 -- KF   03/29/25 2058 100.2 °F (37.9 °C) 112 -- 18 176/77 -- -- OLVIN    03/29/25 2007 100 °F (37.8 °C) 107 97 % 16 156/58 No Pain -- JA   03/29/25 1730 -- 85 98 % 16 147/67 -- --    03/29/25 1700 -- 83 100 % 14 138/65 -- --    03/29/25 1630 -- 81 99 % 14 148/67 -- --    03/29/25 1600 -- 78 94 % 16 123/58 -- --    03/29/25 1515 -- 94 94 % 19 135/63 -- --    03/29/25 1434 99.1 °F (37.3 °C) 101 94 % 16 131/60 1 -- FN            Physical Exam  Vitals and nursing note reviewed.   Constitutional:       General: She is not in acute distress.     Appearance: She is well-developed.   HENT:      Head: Normocephalic and atraumatic.      Right Ear: Ear canal normal.      Left Ear: Ear canal normal.      Nose: Nose normal. No congestion or rhinorrhea.      Mouth/Throat:      Mouth: Mucous membranes are moist.      Pharynx: No oropharyngeal exudate or posterior oropharyngeal erythema.   Eyes:      Extraocular Movements: Extraocular movements intact.      Conjunctiva/sclera: Conjunctivae normal.      Pupils: Pupils are equal, round, and reactive to light.   Neck:      Vascular: No carotid bruit.   Cardiovascular:      Rate and Rhythm: Normal rate and regular rhythm.      Heart sounds: No murmur heard.     No friction rub. No gallop.   Pulmonary:      Effort: Pulmonary effort is normal. No respiratory distress.      Breath sounds: Normal breath sounds. No wheezing, rhonchi or rales.   Chest:      Chest wall: No tenderness.   Abdominal:      General: Abdomen is flat. There is no distension.      Palpations: Abdomen is soft. There is no mass.      Tenderness: There is no abdominal tenderness. There is no right CVA tenderness, left CVA tenderness, guarding or rebound.      Hernia: No hernia is present.   Musculoskeletal:         General: No swelling, tenderness, deformity or signs of injury.      Cervical back: Normal range of motion and neck supple. No rigidity or tenderness.      Right lower leg: No edema.      Left lower leg: No edema.   Lymphadenopathy:      Cervical: No cervical  "adenopathy.   Skin:     General: Skin is warm and dry.      Capillary Refill: Capillary refill takes less than 2 seconds.      Coloration: Skin is not jaundiced or pale.      Findings: No bruising, erythema, lesion or rash.   Neurological:      Mental Status: She is alert.   Psychiatric:         Mood and Affect: Mood normal.         Results Reviewed       Procedure Component Value Units Date/Time    T4, free [[id number]]  (Normal) Collected: 03/29/25 1454    Lab Status: Final result Specimen: Blood from Arm, Left Updated: 03/30/25 0203     Free T4 0.76 ng/dL     Narrative:        \"Therapeutic range for patients medicated with thyroid disorders: 0.61-1.24 ng/dL.\"    HS Troponin I 2hr [[id number]]  (Normal) Collected: 03/29/25 1650    Lab Status: Final result Specimen: Blood from Arm, Left Updated: 03/29/25 1718     hs TnI 2hr 4 ng/L      Delta 2hr hsTnI 0 ng/L     Rapid drug screen, urine [[id number]]  (Abnormal) Collected: 03/29/25 1650    Lab Status: Final result Specimen: Urine, Catheter Updated: 03/29/25 1714     Amph/Meth UR Negative     Barbiturate Ur Negative     Benzodiazepine Urine Positive     Cocaine Urine Negative     Methadone Urine Negative     Opiate Urine Negative     PCP Ur Negative     THC Urine Negative     Oxycodone Urine Negative     Fentanyl Urine Negative     HYDROCODONE URINE Negative    Narrative:      Presumptive report. If requested, specimen will be sent to reference lab for confirmation.  FOR MEDICAL PURPOSES ONLY.   IF CONFIRMATION NEEDED PLEASE CONTACT THE LAB WITHIN 5 DAYS.    Drug Screen Cutoff Levels:  AMPHETAMINE/METHAMPHETAMINES  1000 ng/mL  BARBITURATES     200 ng/mL  BENZODIAZEPINES     200 ng/mL  COCAINE      300 ng/mL  METHADONE      300 ng/mL  OPIATES      300 ng/mL  PHENCYCLIDINE     25 ng/mL  THC       50 ng/mL  OXYCODONE      100 ng/mL  FENTANYL      5 ng/mL  HYDROCODONE     300 ng/mL    Urine Microscopic [[id number]]  (Abnormal) Collected: 03/29/25 1650    Lab Status: Final " result Specimen: Urine, Straight Cath Updated: 03/29/25 1704     RBC, UA 0-5 /hpf      WBC, UA 0-5 /hpf      Epithelial Cells Moderate /hpf      Bacteria, UA Innumerable /hpf      OTHER OBSERVATIONS Transitional Epithelial Cells     MUCUS THREADS Innumerable    UA w Reflex to Microscopic w Reflex to Culture [[id number]]  (Abnormal) Collected: 03/29/25 1650    Lab Status: Final result Specimen: Urine, Straight Cath Updated: 03/29/25 1656     Color, UA Yellow     Clarity, UA Clear     Specific Gravity, UA >=1.030     pH, UA 6.0     Leukocytes, UA Trace     Nitrite, UA Negative     Protein, UA Trace mg/dl      Glucose, UA Negative mg/dl      Ketones, UA Negative mg/dl      Urobilinogen, UA 0.2 E.U./dl      Bilirubin, UA Negative     Occult Blood, UA 1+    TSH [[id number]]  (Abnormal) Collected: 03/29/25 1454    Lab Status: Final result Specimen: Blood from Arm, Left Updated: 03/29/25 1536     TSH 3RD GENERATON 12.493 uIU/mL     HS Troponin 0hr (reflex protocol) [[id number]]  (Normal) Collected: 03/29/25 1454    Lab Status: Final result Specimen: Blood from Arm, Left Updated: 03/29/25 1528     hs TnI 0hr 4 ng/L     FLU/COVID Rapid Antigen (30 min. TAT) - Preferred screening test in ED [[id number]]  (Abnormal) Collected: 03/29/25 1454    Lab Status: Final result Specimen: Nares from Nose Updated: 03/29/25 1520     SARS COV Rapid Antigen Positive     Influenza A Rapid Antigen Negative     Influenza B Rapid Antigen Negative    Narrative:      This test has been performed using the Quidel Alondra 2 FLU+SARS Antigen test under the Emergency Use Authorization (EUA). This test has been validated by the  and verified by the performing laboratory. The Alondra uses lateral flow immunofluorescent sandwich assay to detect SARS-COV, Influenza A and Influenza B Antigen.     The Quidel Alondra 2 SARS Antigen test does not differentiate between SARS-CoV and SARS-CoV-2.     Negative results are presumptive and may be confirmed with a  molecular assay, if necessary, for patient management. Negative results do not rule out SARS-CoV-2 or influenza infection and should not be used as the sole basis for treatment or patient management decisions. A negative test result may occur if the level of antigen in a sample is below the limit of detection of this test.     Positive results are indicative of the presence of viral antigens, but do not rule out bacterial infection or co-infection with other viruses.     All test results should be used as an adjunct to clinical observations and other information available to the provider.    FOR PEDIATRIC PATIENTS - copy/paste COVID Guidelines URL to browser: [email or web address]    Comprehensive metabolic panel [[id number]]  (Abnormal) Collected: 03/29/25 3504    Lab Status: Final result Specimen: Blood from Arm, Left Updated: 03/29/25 1520     Sodium 136 mmol/L      Potassium 3.7 mmol/L      Chloride 102 mmol/L      CO2 24 mmol/L      ANION GAP 10 mmol/L      BUN 12 mg/dL      Creatinine 0.98 mg/dL      Glucose 111 mg/dL      Calcium 9.2 mg/dL      AST 12 U/L      ALT 9 U/L      Alkaline Phosphatase 79 U/L      Total Protein 7.0 g/dL      Albumin 4.2 g/dL      Total Bilirubin 0.68 mg/dL      eGFR 52 ml/min/1.73sq m     Narrative:      National Kidney Disease Foundation guidelines for Chronic Kidney Disease (CKD):     Stage 1 with normal or high GFR (GFR > 90 mL/min/1.73 square meters)    Stage 2 Mild CKD (GFR = 60-89 mL/min/1.73 square meters)    Stage 3A Moderate CKD (GFR = 45-59 mL/min/1.73 square meters)    Stage 3B Moderate CKD (GFR = 30-44 mL/min/1.73 square meters)    Stage 4 Severe CKD (GFR = 15-29 mL/min/1.73 square meters)    Stage 5 End Stage CKD (GFR <15 mL/min/1.73 square meters)  Note: GFR calculation is accurate only with a steady state creatinine    CBC and differential [[id number]]  (Abnormal) Collected: 03/29/25 3886    Lab Status: Final result  Specimen: Blood from Arm, Left Updated: 03/29/25 1505     WBC 8.47 Thousand/uL      RBC 4.20 Million/uL      Hemoglobin 13.0 g/dL      Hematocrit 39.6 %      MCV 94 fL      MCH 31.0 pg      MCHC 32.8 g/dL      RDW 12.9 %      MPV 8.9 fL      Platelets 320 Thousands/uL      nRBC 0 /100 WBCs      Segmented % 87 %      Immature Grans % 0 %      Lymphocytes % 5 %      Monocytes % 7 %      Eosinophils Relative 0 %      Basophils Relative 1 %      Absolute Neutrophils 7.37 Thousands/µL      Absolute Immature Grans 0.03 Thousand/uL      Absolute Lymphocytes 0.43 Thousands/µL      Absolute Monocytes 0.59 Thousand/µL      Eosinophils Absolute 0.00 Thousand/µL      Basophils Absolute 0.05 Thousands/µL             CT head wo contrast   Final Interpretation by Froylan Marie MD (03/29 1540)      No acute hemorrhage. Left thalamic hypodensity which might represent age-indeterminate infarct.      The study was marked in EPIC for immediate notification.                  Workstation performed: [id number]         XR chest 1 view portable   ED Interpretation by Johnathan Hinds MD (03/29 1507)   Cxr; no acute cardiopulmonary findings      Final Interpretation by Subha Choudhury MD (03/30 0811)      No acute cardiopulmonary disease.            Workstation performed: [id number]             ECG 12 Lead Documentation Only    Date/Time: 3/29/2025 6:35 PM    Performed by: Johnathan Hinds MD  Authorized by: Johnathan Hinds MD    Indications / Diagnosis:  AMS  ECG reviewed by me, the ED Provider: yes    Patient location:  ED  Previous ECG:     Previous ECG:  Compared to current    Similarity:  Changes noted  Interpretation:     Interpretation: normal    Rate:     ECG rate:  95    ECG rate assessment: normal    Rhythm:     Rhythm: sinus rhythm    Ectopy:     Ectopy: none    QRS:     QRS axis:  Normal    QRS intervals:  Normal  Conduction:     Conduction: normal        ED Medication and Procedure Management   Prior to  Admission Medications   Prescriptions Last Dose Informant Patient Reported? Taking?   ALPRAZolam (XANAX) 0.5 mg tablet 3/29/2025 at 10:00 AM  No Yes   Sig: Take 1 tablet (0.5 mg total) by mouth 2 (two) times a day as needed for anxiety   Calcium Carbonate-Vit D-Min (CALTRATE 600+D PLUS MINERALS) 600-800 MG-UNIT TABS 3/29/2025 Self Yes Yes   Sig: Take by mouth daily     Cholecalciferol (VITAMIN D3) 1000 units CAPS  Self Yes No   Sig: Take by mouth daily     Patient not taking: Reported on 6/14/2024   MAGNESIUM PO Unknown Self Yes No   Sig: Take by mouth daily     Multiple Vitamins-Minerals (PreserVision AREDS 2) CAPS Unknown Self Yes No   Sig: Take by mouth   aspirin (ECOTRIN LOW STRENGTH) 81 mg EC tablet 3/29/2025 Self No Yes   Sig: Take 1 tablet (81 mg total) by mouth daily   atorvastatin (LIPITOR) 20 mg tablet 3/29/2025  No Yes   Sig: Take 1 tablet (20 mg total) by mouth daily   levothyroxine 100 mcg tablet 3/29/2025  No Yes   Sig: TAKE ONE TABLET BY MOUTH MONDAY,WEDNESDAY, AND FRIDAY   levothyroxine 88 mcg tablet   No No   Sig: TAKE 1 TABLET BY MOUTH IN THE AM TUE, THUR, SAT AND SUN   metoprolol tartrate (LOPRESSOR) 25 mg tablet 3/29/2025  No Yes   Sig: TAKE 1/2 OF A TABLET (12.5 MG TOTAL) BY MOUTH TWICE A DAY      Facility-Administered Medications: None     Current Discharge Medication List        CONTINUE these medications which have NOT CHANGED    Details   ALPRAZolam (XANAX) 0.5 mg tablet Take 1 tablet (0.5 mg total) by mouth 2 (two) times a day as needed for anxiety  Qty: 180 tablet, Refills: 5    Comments: This request is for a new prescription for a controlled substance as required by Federal/State law. DX Code Needed  .  Associated Diagnoses: Anxiety      aspirin (ECOTRIN LOW STRENGTH) 81 mg EC tablet Take 1 tablet (81 mg total) by mouth daily  Qty: 30 tablet, Refills: 0    Associated Diagnoses: Dyspnea on effort      atorvastatin (LIPITOR) 20 mg tablet Take 1 tablet (20 mg total) by mouth daily  Qty: 90  tablet, Refills: 3    Associated Diagnoses: Hyperlipidemia, unspecified hyperlipidemia type      Calcium Carbonate-Vit D-Min (CALTRATE 600+D PLUS MINERALS) 600-800 MG-UNIT TABS Take by mouth daily        !! levothyroxine 100 mcg tablet TAKE ONE TABLET BY MOUTH MONDAY,WEDNESDAY, AND FRIDAY    Associated Diagnoses: Mixed hyperlipidemia; Essential hypertension; Hypothyroidism, unspecified type      metoprolol tartrate (LOPRESSOR) 25 mg tablet TAKE 1/2 OF A TABLET (12.5 MG TOTAL) BY MOUTH TWICE A DAY  Qty: 90 tablet, Refills: 1    Associated Diagnoses: Dyspnea on effort; Abnormal stress test      Cholecalciferol (VITAMIN D3) 1000 units CAPS Take by mouth daily        !! levothyroxine 88 mcg tablet TAKE 1 TABLET BY MOUTH IN THE AM TUE, THUR, SAT AND SUN  Qty: 48 tablet, Refills: 3    Associated Diagnoses: Mixed hyperlipidemia; Essential hypertension      MAGNESIUM PO Take by mouth daily        Multiple Vitamins-Minerals (PreserVision AREDS 2) CAPS Take by mouth       !! - Potential duplicate medications found. Please discuss with provider.        No discharge procedures on file.  ED SEPSIS DOCUMENTATION   Time reflects when diagnosis was documented in both MDM as applicable and the Disposition within this note       Time User Action Codes Description Comment    3/29/2025  6:30 PM Johnathan Hinds Add [R41.82] Altered mental status, unspecified altered mental status type     3/29/2025  6:30 PM Johnathan Hinds Add [E03.9] Hypothyroidism, unspecified type     3/29/2025  6:30 PM Johnathan Hinds Add [U07.1] COVID-19                  Johnathan Hinds MD  03/30/25 4572

## 2025-03-29 NOTE — ASSESSMENT & PLAN NOTE
[facility] in 2018 showing one-vessel CAD.  LAD 50% stenosed.  Did undergo PCI  Continue metoprolol, ASA, and Lipitor

## 2025-03-29 NOTE — H&P
H&P - Hospitalist   Name: Sarah Bautista 84 y.o. female I MRN: [id number]  Unit/Bed#: -01 I Date of Admission: 3/29/2025   Date of Service: 3/29/2025 I Hospital Day: 0     Assessment & Plan  Metabolic encephalopathy  Patient brought in by son for AMS, lethargy, not acting herself.  In ED disoriented to time and place otherwise awake and alert.  Per discussion with her son patient has baseline confusion and lives with son who is her primary caretaker.  He feels that she is not too far off from her baseline.  Upon exam patient is alert to self and time disoriented to situation.  When asked why she was here she stated talk to Royer over there while no one was else was in the room..  CT head showing no acute hemorrhage.  Left pelvic hypodensity which may represent age-indeterminate infarct  CXR without acute cardiopulmonary findings  In ED tested positive for COVID-19  UDS positive for benzodiazepines  Suspect AMS in the setting of COVID vs dehydration vs UTI?.  Question underlying dementia/Alzheimer's?  As patient has confusion at baseline.  Plan  Frequent neurochecks  Reorient as needed  Regulate sleep/wake cycles  Would hold home dose Xanax until mentation improves  Continue treatment for COVID  Initiate on IV fluids  PT and OT cog eval  COVID-19  Presents with altered mental status found to be COVID-positive in the ED.  Currently asymptomatic not requiring any O2 supplementation. Not meeting SIRS  CXR without acute cardiopulmonary disease  Or on mild COVID pathway  Obtain COVID labs  Monitor respiratory status closely.  Hold off on remdesivir/antiviral given asymptomatic  Supportive care  Other specified hypothyroidism  TSH 12.493 follow-up free T4.  Does have history of hemithyroidectomy  ED discussed with endocrinology who felt that this is less likely myxedema or severe hypothyroidism.  TSH is mildly elevated for her age.  Unclear patient has been consistent with taking her medication.  Continue  levothyroxine   88 mcg Tuesday, Thursday, Saturday, Sunday  100 mcg Monday, Wednesday, Friday  Endocrinology recommending resuming home dose and rechecking later in the week  Follow-up on free T4  Anxiety  On Xanax 0.5 mg twice daily  PDMP reviewed  Hold tonight's dose in the setting of AMS  And consider resuming when mentation improves  Hyperlipidemia  Continue statin therapy  Encourage lifestyle modifications  Stage 3a chronic kidney disease (HCC)  Lab Results   Component Value Date    EGFR 52 03/29/2025    EGFR 43 01/21/2025    EGFR 49 (L) 06/26/2023    CREATININE 0.98 03/29/2025    CREATININE 1.16 01/21/2025    CREATININE 1.12 (H) 06/26/2023   At baseline continue to trend  Hypertension  Continue metoprolol 25 mg twice daily  Coronary artery disease involving native coronary artery of native heart without angina pectoris  [facility] in 2018 showing one-vessel CAD.  LAD 50% stenosed.  Did undergo PCI  Continue metoprolol, ASA, and Lipitor  Abnormal urinalysis  UA with trace leukocytes.  Urine micro showing innumerable bacteria  Given AMS will empirically treat with IV Rocephin  Follow-up on urine cultures      VTE Pharmacologic Prophylaxis:   Moderate Risk (Score 3-4) - Pharmacological DVT Prophylaxis Ordered: heparin.  Code Status: Level 1 - Full Code   Discussion with family: Updated  (son) via phone.    Anticipated Length of Stay: Patient will be admitted on an inpatient basis with an anticipated length of stay of greater than 2 midnights secondary to metabolic encephalopathy, COVID-19, abnormal TSH.    History of Present Illness   Chief Complaint: Altered mental status    Sarah Bautista is a 84 y.o. female with a PMH of anxiety, CAD, hyperlipidemia, hypothyroidism, CKD stage III who presents with metabolic encephalopathy.  Patient presents from home with altered mental status, lethargy, and not acting herself.  In the ED was found to be positive for COVID-19 however is currently asymptomatic.  CT  imaging demonstrating old age-indeterminate infarct.  Currently neurologically intact.  TSH mildly elevated ED discussed with endocrinology recommending to resume her home dose of levothyroxine and to recheck in a few days to see if there is improvement.  It is unclear if patient has been compliant with her medications.  Patient lives at home with her son and is typically independent.  Her son does mention that the patient at baseline is forgetful and repeats herself.  He denies any underlying history of Alzheimer's or dementia.  When talking to the son he reports that he feels she is at her baseline may be slightly less.  Denies any illicit drug use, tobacco use, or alcohol use.  Denies any fevers, chills, nausea, vomiting, shortness of breath, lightheadedness, dizziness.  In ED was having difficulty ambulating and at baseline does not use any ambulatory aids.    Review of Systems   Constitutional:  Positive for activity change.   Psychiatric/Behavioral:  Positive for confusion.        Historical Information   Past Medical History:   Diagnosis Date    Anxiety     Back pain     Disease of thyroid gland     Diverticulitis     Diverticulosis     Fibroid     GERD (gastroesophageal reflux disease)     Heart murmur     History of mammogram 11/01/2017    Hypertension     Hypothyroidism     Osteoporosis     Shortness of breath      Past Surgical History:   Procedure Laterality Date    APPENDECTOMY      COLONOSCOPY  2013    EYE SURGERY Right 07/05/2017    catarect surgery     EYE SURGERY Left 05/24/2017    catarect surgery     HEMORRHOID SURGERY      MAMMO (HISTORICAL)  11/01/2017    THYROID SURGERY      TOTAL ABDOMINAL HYSTERECTOMY      TUBAL LIGATION       Social History     Tobacco Use    Smoking status: Former     Types: Cigarettes     Passive exposure: Past    Smokeless tobacco: Never    Tobacco comments:     as per Elida   Vaping Use    Vaping status: Never Used   Substance and Sexual Activity    Alcohol use: No      Comment: rarely    Drug use: No    Sexual activity: Not Currently     Birth control/protection: Surgical     Comment: NAN-BSO      E-Cigarette/Vaping    E-Cigarette Use Never User      E-Cigarette/Vaping Substances    Nicotine No     THC No     CBD No     Flavoring No     Other No     Unknown No      Family History   Problem Relation Age of Onset    Hypertension Father     Asthma Son     Arrhythmia Son      Social History:  Marital Status: /Civil Union   Occupation: Retired  Patient Pre-hospital Living Situation: Home  Patient Pre-hospital Level of Mobility: walks  Patient Pre-hospital Diet Restrictions: None    Meds/Allergies   I have reviewed home medications using recent Epic encounter.  Prior to Admission medications    Medication Sig Start Date End Date Taking? Authorizing Provider   ALPRAZolam (XANAX) 0.5 mg tablet Take 1 tablet (0.5 mg total) by mouth 2 (two) times a day as needed for anxiety 1/29/25  Yes Brett Conde MD   aspirin (ECOTRIN LOW STRENGTH) 81 mg EC tablet Take 1 tablet (81 mg total) by mouth daily 11/14/18  Yes Savannah Tovar MD   atorvastatin (LIPITOR) 20 mg tablet Take 1 tablet (20 mg total) by mouth daily 1/29/25  Yes Brett Conde MD   Calcium Carbonate-Vit D-Min (CALTRATE 600+D PLUS MINERALS) 600-800 MG-UNIT TABS Take by mouth daily     Yes Historical Provider, MD   levothyroxine 100 mcg tablet TAKE ONE TABLET BY MOUTH MONDAY,WEDNESDAY, AND FRIDAY 1/29/25  Yes Brett Conde MD   metoprolol tartrate (LOPRESSOR) 25 mg tablet TAKE 1/2 OF A TABLET (12.5 MG TOTAL) BY MOUTH TWICE A DAY 12/12/24  Yes Brett Conde MD   Cholecalciferol (VITAMIN D3) 1000 units CAPS Take by mouth daily    Patient not taking: Reported on 6/14/2024    Historical Provider, MD   levothyroxine 88 mcg tablet TAKE 1 TABLET BY MOUTH IN THE AM TUE, THUR, SAT AND SUN 1/29/25   Brett Conde MD   MAGNESIUM PO Take by mouth daily      Historical Provider, MD   Multiple Vitamins-Minerals (PreserVision AREDS  2) CAPS Take by mouth    Historical Provider, MD     No Known Allergies    Objective :  Temp:  [99.1 °F (37.3 °C)-100 °F (37.8 °C)] 100 °F (37.8 °C)  HR:  [] 107  BP: (123-156)/(58-67) 156/58  Resp:  [14-19] 16  SpO2:  [94 %-100 %] 97 %  O2 Device: None (Room air)    Physical Exam  Vitals and nursing note reviewed.   Constitutional:       General: She is not in acute distress.     Appearance: She is well-developed. She is not ill-appearing.   HENT:      Head: Normocephalic and atraumatic.      Mouth/Throat:      Mouth: Mucous membranes are dry.   Eyes:      Extraocular Movements: Extraocular movements intact.      Conjunctiva/sclera: Conjunctivae normal.   Cardiovascular:      Rate and Rhythm: Normal rate and regular rhythm.      Heart sounds: No murmur heard.  Pulmonary:      Effort: Pulmonary effort is normal. No respiratory distress.      Breath sounds: Normal breath sounds. No wheezing or rales.   Abdominal:      Palpations: Abdomen is soft.      Tenderness: There is no abdominal tenderness.   Musculoskeletal:         General: No swelling.      Cervical back: Neck supple.      Right lower leg: No edema.      Left lower leg: No edema.   Skin:     General: Skin is warm and dry.      Capillary Refill: Capillary refill takes less than 2 seconds.   Neurological:      Mental Status: She is alert. She is disoriented.      GCS: GCS eye subscore is 4. GCS verbal subscore is 4. GCS motor subscore is 6.      Cranial Nerves: No facial asymmetry.      Sensory: Sensation is intact.      Motor: No weakness.      Comments: Patient is awake and alert.  She is oriented to person and time.  She is disoriented to place and situation.  When evaluating patient she reports a grinding teeth is in the room however he was not there.  She is able to follow commands without difficulty.  Speech is clear.  No facial asymmetry noted.  Sensation intact in all 4 extremities.  Muscle strength intact in all 4 extremities 5 out of 5.  "  Psychiatric:         Mood and Affect: Mood normal.          Lines/Drains:            Lab Results: I have reviewed the following results:  Results from last 7 days   Lab Units 03/29/25  1454   WBC Thousand/uL 8.47   HEMOGLOBIN g/dL 13.0   HEMATOCRIT % 39.6   PLATELETS Thousands/uL 320   SEGS PCT % 87*   LYMPHO PCT % 5*   MONO PCT % 7   EOS PCT % 0     Results from last 7 days   Lab Units 03/29/25  1454   SODIUM mmol/L 136   POTASSIUM mmol/L 3.7   CHLORIDE mmol/L 102   CO2 mmol/L 24   BUN mg/dL 12   CREATININE mg/dL 0.98   ANION GAP mmol/L 10   CALCIUM mg/dL 9.2   ALBUMIN g/dL 4.2   TOTAL BILIRUBIN mg/dL 0.68   ALK PHOS U/L 79   ALT U/L 9   AST U/L 12*   GLUCOSE RANDOM mg/dL 111             No results found for: \"HGBA1C\"        Imaging Results Review: I reviewed radiology reports from this admission including: chest xray.  CT head  Other Study Results Review: EKG was reviewed.     Administrative Statements   I have spent a total time of 75 minutes in caring for this patient on the day of the visit/encounter including Diagnostic results, Prognosis, Risks and benefits of tx options, Instructions for management, Patient and family education, Importance of tx compliance, Risk factor reductions, Impressions, Counseling / Coordination of care, Documenting in the medical record, Reviewing/placing orders in the medical record (including tests, medications, and/or procedures), Obtaining or reviewing history  , and Communicating with other healthcare professionals .    ** Please Note: This note has been constructed using a voice recognition system. **    "

## 2025-03-29 NOTE — Clinical Note
Case was discussed with nisha and the patient's admission status was agreed to be Admission Status: inpatient status to the service of Dr. Horowitz

## 2025-03-30 PROBLEM — N39.0 UTI (URINARY TRACT INFECTION): Status: ACTIVE | Noted: 2025-03-29

## 2025-03-30 LAB
ANION GAP SERPL CALCULATED.3IONS-SCNC: 9 MMOL/L (ref 4–13)
ATRIAL RATE: 95 BPM
BASOPHILS # BLD AUTO: 0.04 THOUSANDS/ÂΜL (ref 0–0.1)
BASOPHILS NFR BLD AUTO: 1 % (ref 0–1)
BUN SERPL-MCNC: 10 MG/DL (ref 5–25)
CALCIUM SERPL-MCNC: 8.9 MG/DL (ref 8.4–10.2)
CHLORIDE SERPL-SCNC: 102 MMOL/L (ref 96–108)
CO2 SERPL-SCNC: 26 MMOL/L (ref 21–32)
CREAT SERPL-MCNC: 0.9 MG/DL (ref 0.6–1.3)
EOSINOPHIL # BLD AUTO: 0 THOUSAND/ÂΜL (ref 0–0.61)
EOSINOPHIL NFR BLD AUTO: 0 % (ref 0–6)
ERYTHROCYTE [DISTWIDTH] IN BLOOD BY AUTOMATED COUNT: 13 % (ref 11.6–15.1)
GFR SERPL CREATININE-BSD FRML MDRD: 58 ML/MIN/1.73SQ M
GLUCOSE SERPL-MCNC: 103 MG/DL (ref 65–140)
HCT VFR BLD AUTO: 38.1 % (ref 34.8–46.1)
HGB BLD-MCNC: 12.4 G/DL (ref 11.5–15.4)
IMM GRANULOCYTES # BLD AUTO: 0.04 THOUSAND/UL (ref 0–0.2)
IMM GRANULOCYTES NFR BLD AUTO: 1 % (ref 0–2)
LYMPHOCYTES # BLD AUTO: 0.65 THOUSANDS/ÂΜL (ref 0.6–4.47)
LYMPHOCYTES NFR BLD AUTO: 9 % (ref 14–44)
MAGNESIUM SERPL-MCNC: 2.3 MG/DL (ref 1.9–2.7)
MCH RBC QN AUTO: 31.1 PG (ref 26.8–34.3)
MCHC RBC AUTO-ENTMCNC: 32.5 G/DL (ref 31.4–37.4)
MCV RBC AUTO: 96 FL (ref 82–98)
MONOCYTES # BLD AUTO: 0.73 THOUSAND/ÂΜL (ref 0.17–1.22)
MONOCYTES NFR BLD AUTO: 10 % (ref 4–12)
NEUTROPHILS # BLD AUTO: 6.14 THOUSANDS/ÂΜL (ref 1.85–7.62)
NEUTS SEG NFR BLD AUTO: 79 % (ref 43–75)
NRBC BLD AUTO-RTO: 0 /100 WBCS
P AXIS: 53 DEGREES
PLATELET # BLD AUTO: 281 THOUSANDS/UL (ref 149–390)
PMV BLD AUTO: 9 FL (ref 8.9–12.7)
POTASSIUM SERPL-SCNC: 3.7 MMOL/L (ref 3.5–5.3)
PR INTERVAL: 120 MS
QRS AXIS: 45 DEGREES
QRSD INTERVAL: 78 MS
QT INTERVAL: 372 MS
QTC INTERVAL: 467 MS
RBC # BLD AUTO: 3.99 MILLION/UL (ref 3.81–5.12)
SODIUM SERPL-SCNC: 137 MMOL/L (ref 135–147)
T WAVE AXIS: 40 DEGREES
T4 FREE SERPL-MCNC: 0.76 NG/DL (ref 0.61–1.12)
VENTRICULAR RATE: 95 BPM
WBC # BLD AUTO: 7.6 THOUSAND/UL (ref 4.31–10.16)

## 2025-03-30 PROCEDURE — 87086 URINE CULTURE/COLONY COUNT: CPT | Performed by: INTERNAL MEDICINE

## 2025-03-30 PROCEDURE — 80048 BASIC METABOLIC PNL TOTAL CA: CPT

## 2025-03-30 PROCEDURE — 83735 ASSAY OF MAGNESIUM: CPT

## 2025-03-30 PROCEDURE — 99232 SBSQ HOSP IP/OBS MODERATE 35: CPT | Performed by: INTERNAL MEDICINE

## 2025-03-30 PROCEDURE — 93010 ELECTROCARDIOGRAM REPORT: CPT | Performed by: INTERNAL MEDICINE

## 2025-03-30 PROCEDURE — 85025 COMPLETE CBC W/AUTO DIFF WBC: CPT

## 2025-03-30 RX ORDER — LEVOTHYROXINE SODIUM 100 UG/1
100 TABLET ORAL
Status: DISCONTINUED | OUTPATIENT
Start: 2025-03-31 | End: 2025-03-31 | Stop reason: HOSPADM

## 2025-03-30 RX ORDER — ALPRAZOLAM 0.25 MG
0.25 TABLET ORAL 2 TIMES DAILY PRN
Status: DISCONTINUED | OUTPATIENT
Start: 2025-03-30 | End: 2025-03-31 | Stop reason: HOSPADM

## 2025-03-30 RX ADMIN — ATORVASTATIN CALCIUM 20 MG: 20 TABLET, FILM COATED ORAL at 17:19

## 2025-03-30 RX ADMIN — Medication 1 TABLET: at 10:32

## 2025-03-30 RX ADMIN — LEVOTHYROXINE SODIUM 88 MCG: 88 TABLET ORAL at 06:03

## 2025-03-30 RX ADMIN — Medication 12.5 MG: at 21:31

## 2025-03-30 RX ADMIN — HEPARIN SODIUM 5000 UNITS: 5000 INJECTION INTRAVENOUS; SUBCUTANEOUS at 06:03

## 2025-03-30 RX ADMIN — ASPIRIN 81 MG: 81 TABLET, COATED ORAL at 10:20

## 2025-03-30 RX ADMIN — CEFTRIAXONE 1000 MG: 1 INJECTION, SOLUTION INTRAVENOUS at 21:31

## 2025-03-30 RX ADMIN — Medication 12.5 MG: at 10:20

## 2025-03-30 RX ADMIN — CALCIUM 1 TABLET: 500 TABLET ORAL at 10:20

## 2025-03-30 RX ADMIN — HEPARIN SODIUM 5000 UNITS: 5000 INJECTION INTRAVENOUS; SUBCUTANEOUS at 14:01

## 2025-03-30 RX ADMIN — HEPARIN SODIUM 5000 UNITS: 5000 INJECTION INTRAVENOUS; SUBCUTANEOUS at 21:30

## 2025-03-30 NOTE — ASSESSMENT & PLAN NOTE
Continue metoprolol 25 mg twice daily  Monitor blood pressures.  Currently, no concern for hypertensive encephalopathy.

## 2025-03-30 NOTE — ASSESSMENT & PLAN NOTE
[facility] in 2018 showing one-vessel CAD.  LAD 50% stenosed.  Did undergo PCI  Continue metoprolol, ASA, and Lipitor

## 2025-03-30 NOTE — ASSESSMENT & PLAN NOTE
UA with trace leukocytes.  Urine micro showing innumerable bacteria  Given AMS will empirically treat with IV Rocephin  Follow-up on urine cultures

## 2025-03-30 NOTE — ASSESSMENT & PLAN NOTE
Continues on atorvastatin therapy.  She takes 20 mg by mouth daily.  Encourage lifestyle modifications

## 2025-03-30 NOTE — PLAN OF CARE
Problem: NEUROSENSORY - ADULT  Goal: Achieves stable or improved neurological status  Description: INTERVENTIONS- Monitor and report changes in neurological status- Monitor vital signs such as temperature, blood pressure, glucose, and any other labs ordered - Initiate measures to prevent increased intracranial pressure- Monitor for seizure activity and implement precautions if appropriate    Outcome: Progressing  Goal: Achieves maximal functionality and self care  Description: INTERVENTIONS- Monitor swallowing and airway patency with patient fatigue and changes in neurological status- Encourage and assist patient to increase activity and self care. - Encourage visually impaired, hearing impaired and aphasic patients to use assistive/communication devices  Outcome: Progressing      Problem: RESPIRATORY - ADULT  Goal: Achieves optimal ventilation and oxygenation  Description: INTERVENTIONS:- Assess for changes in respiratory status- Assess for changes in mentation and behavior- Position to facilitate oxygenation and minimize respiratory effort- Oxygen administered by appropriate delivery if ordered- Initiate smoking cessation education as indicated- Encourage broncho-pulmonary hygiene including cough, deep breathe, Incentive Spirometry- Assess the need for suctioning and aspirate as needed- Assess and instruct to report SOB or any respiratory difficulty- Respiratory Therapy support as indicated  Outcome: Progressing            Problem: MUSCULOSKELETAL - ADULT  Goal: Maintain or return mobility to safest level of function  Description: INTERVENTIONS:- Assess patient's ability to carry out ADLs; assess patient's baseline for ADL function and identify physical deficits which impact ability to perform ADLs (bathing, care of mouth/teeth, toileting, grooming, dressing, etc.)- Assess/evaluate cause of self-care deficits - Assess range of motion- Assess patient's mobility- Assess patient's need for assistive devices and  provide as appropriate- Encourage maximum independence but intervene and supervise when necessary- Involve family in performance of ADLs- Assess for home care needs following discharge - Consider OT consult to assist with ADL evaluation and planning for discharge- Provide patient education as appropriate  Outcome: Progressing  Goal: Maintain proper alignment of affected body part  Description: INTERVENTIONS:- Support, maintain and protect limb and body alignment- Provide patient/ family with appropriate education  Outcome: Progressing         Problem: PAIN - ADULT  Goal: Verbalizes/displays adequate comfort level or baseline comfort level  Description: Interventions:- Encourage patient to monitor pain and request assistance- Assess pain using appropriate pain scale- Administer analgesics based on type and severity of pain and evaluate response- Implement non-pharmacological measures as appropriate and evaluate response- Consider cultural and social influences on pain and pain management- Notify physician/advanced practitioner if interventions unsuccessful or patient reports new pain  Outcome: Progressing         Problem: INFECTION - ADULT  Goal: Absence or prevention of progression during hospitalization  Description: INTERVENTIONS:- Assess and monitor for signs and symptoms of infection- Monitor lab/diagnostic results- Monitor all insertion sites, i.e. indwelling lines, tubes, and drains- Monitor endotracheal if appropriate and nasal secretions for changes in amount and color- [place] appropriate cooling/warming therapies per order- Administer medications as ordered- Instruct and encourage patient and family to use good hand hygiene technique- Identify and instruct in appropriate isolation precautions for identified infection/condition  Outcome: Progressing  Goal: Absence of fever/infection during neutropenic period  Description: INTERVENTIONS:- Monitor WBC  Outcome: Progressing         Problem: SAFETY  ADULT  Goal: Patient will remain free of falls  Description: INTERVENTIONS:- Educate patient/family on patient safety including physical limitations- Instruct patient to call for assistance with activity - Consult OT/PT to assist with strengthening/mobility - Keep Call bell within reach- Keep bed low and locked with side rails adjusted as appropriate- Keep care items and personal belongings within reach- Initiate and maintain comfort rounds- Make Fall Risk Sign visible to staff- Offer Toileting every 2 Hours, in advance of need- Initiate/Maintain bed alarm- Obtain necessary fall risk management equipment: - Apply yellow socks and bracelet for high fall risk patients- Consider moving patient to room near nurses station  Outcome: Progressing         Problem: DISCHARGE PLANNING  Goal: Discharge to home or other facility with appropriate resources  Description: INTERVENTIONS:- Identify barriers to discharge w/patient and caregiver- Arrange for needed discharge resources and transportation as appropriate- Identify discharge learning needs (meds, wound care, etc.)- Arrange for interpretive services to assist at discharge as needed- Refer to Case Management Department for coordinating discharge planning if the patient needs post-hospital services based on physician/advanced practitioner order or complex needs related to functional status, cognitive ability, or social support system  Outcome: Progressing

## 2025-03-30 NOTE — ASSESSMENT & PLAN NOTE
UA with trace leukocytes.  Urine micro showing innumerable bacteria  Given AMS we decided to empirically treat with IV Rocephin on admission.  Unfortunately, the urinalysis did not automatically reflex to a urine culture so I did add a urine culture on the morning of 3/30/2025 but patient has already had 1 dose of antibiotic.  Therefore, a negative culture does not definitively rule out UTI.  I would recommend total of 5 days antibiotic.

## 2025-03-30 NOTE — ASSESSMENT & PLAN NOTE
At baseline, the patient does have a history of hemithyroidectomy.  TSH 12.493 with normal free T4.  Prehospital Dosin mcg Tuesday, Thursday, Saturday,   100 mcg Monday, Wednesday, Friday  Inpatient dosing /discharge dosing -   Per my discussion with patient's son on 3/30/2025, the patient does take her levothyroxine without interruption.  Therefore, with the elevated TSH, I do feel that she may benefit from increasing up to 100 mcg by mouth daily and rechecking thyroid levels within the next few weeks.  ED discussed with endocrinology on admission.    Son requests referral to endocrinology after discharge

## 2025-03-30 NOTE — ASSESSMENT & PLAN NOTE
"Background: Patient brought in by son for AMS, lethargy, not acting herself.  In ED disoriented to time and place otherwise awake and alert.  Per discussion with her son patient has baseline confusion and lives with son who is her primary caretaker.  He feels that she is not too far off from her baseline.  Upon exam in ED, patient is alert to self and time but disoriented to situation.  When asked why she was here she stated \"talk to Royer over there\" while no one was else was in the room.  Differential Diagnosis -COVID brain fog, hypothyroidism, possible UTI, Xanax use prehospital,?  Mild dehydration.  Baseline Mental Status - has had some cognitive impairment at baseline and currently near baseline.  Has had some decline over some time and actually in April 2025, has a visit with neurology for cognitive assessment to evaluate for potential dementia.  Medication Review (potential causes / contributors) - Xanax is the only home medication that would typically have risk for causing delirium/encephalopathy.  Abnormal Testing Thus Far -   TSH was around 12 which is showing underactive thyroid function.  Notably, though, the free T4 was 0.76.  CT head did not show any acute infarction but there was a left Tomich hypodensity which may represent age-indeterminate infarct.  COVID antigen testing positive.  Urinalysis with innumerable bacteria and trace leukocytes.  Negative Testing Thus Far -   Sodium, glucose, calcium, creatinine are all normal  WBC  Urine drug screen only positive for benzos which is appropriate given the patient has Xanax dosing at home.  Chest x-ray shows no evidence of pneumonia  Additional Testing Being Requested -   Consider MRI brain if focal deficits occur or if the patient is not improving.  Consultations -none currently required.  Could consider geriatrics consult if not improving.  Patient safety -   Redirection and reorientation as needed.  Bed alarm and fall precautions.  Frequent daytime " interaction with staff and family.  Delirium reducing measures -   Assure proper sleep hygiene.  Avoid constipation.  Treat any pain symptoms that may be present using nondelirium inducing medications whenever possible.  Assure proper p.o. intake of food and liquid.  Serial Mental Status Exams.  PT evaluation and OT cognitive evaluation tomorrow, 3/31/2025

## 2025-03-30 NOTE — PROGRESS NOTES
"Progress Note - Hospitalist   Name: Sarah Bautista 84 y.o. female I MRN: [id number]  Unit/Bed#: -01 I Date of Admission: 3/29/2025   Date of Service: 3/30/2025 I Hospital Day: 1     Assessment & Plan  Metabolic encephalopathy  Background: Patient brought in by son for AMS, lethargy, not acting herself.  In ED disoriented to time and place otherwise awake and alert.  Per discussion with her son patient has baseline confusion and lives with son who is her primary caretaker.  He feels that she is not too far off from her baseline.  Upon exam in ED, patient is alert to self and time but disoriented to situation.  When asked why she was here she stated \"talk to Royer over there\" while no one was else was in the room.  Differential Diagnosis -COVID brain fog, hypothyroidism, possible UTI, Xanax use prehospital,?  Mild dehydration.  Baseline Mental Status - has had some cognitive impairment at baseline and currently near baseline.  Has had some decline over some time and actually in April 2025, has a visit with neurology for cognitive assessment to evaluate for potential dementia.  Medication Review (potential causes / contributors) - Xanax is the only home medication that would typically have risk for causing delirium/encephalopathy.  Abnormal Testing Thus Far -   TSH was around 12 which is showing underactive thyroid function.  Notably, though, the free T4 was 0.76.  CT head did not show any acute infarction but there was a left Tomich hypodensity which may represent age-indeterminate infarct.  COVID antigen testing positive.  Urinalysis with innumerable bacteria and trace leukocytes.  Negative Testing Thus Far -   Sodium, glucose, calcium, creatinine are all normal  WBC  Urine drug screen only positive for benzos which is appropriate given the patient has Xanax dosing at home.  Chest x-ray shows no evidence of pneumonia  Additional Testing Being Requested -   Consider MRI brain if focal deficits occur or if the " patient is not improving.  Consultations -none currently required.  Could consider geriatrics consult if not improving.  Patient safety -   Redirection and reorientation as needed.  Bed alarm and fall precautions.  Frequent daytime interaction with staff and family.  Delirium reducing measures -   Assure proper sleep hygiene.  Avoid constipation.  Treat any pain symptoms that may be present using nondelirium inducing medications whenever possible.  Assure proper p.o. intake of food and liquid.  Serial Mental Status Exams.  PT evaluation and OT cognitive evaluation tomorrow, 3/31/2025  COVID-19  As patient is not requiring oxygen therapy currently, we are following network recommendation for mild COVID pathway  Monitor respiratory status and oxygen saturations closely.    If an oxygen requirement were to occur then patient will be placed promptly on remdesivir and dexamethasone 6 mg IV daily.  Monitor temperatures.  Tylenol for any temperature elevation/fever.  Supportive care  Other specified hypothyroidism  At baseline, the patient does have a history of hemithyroidectomy.  TSH 12.493 with normal free T4.  Prehospital Dosin mcg Tuesday, Thursday, Saturday,   100 mcg Monday, Wednesday, Friday  Inpatient dosing /discharge dosing -   Per my discussion with patient's son on 3/30/2025, the patient does take her levothyroxine without interruption.  Therefore, with the elevated TSH, I do feel that she may benefit from increasing up to 100 mcg by mouth daily and rechecking thyroid levels within the next few weeks.  ED discussed with endocrinology on admission.    Son requests referral to endocrinology after discharge  Anxiety  On Xanax 0.5 mg twice daily  PDMP reviewed on admission  We can restart the Xanax to avoid withdrawal but I will started at just 0.25 mg as this is how patient often takes it at home to stretch out the medication.  Continue to monitor mental status after restarting the  medication.  Ultimately, benzodiazepines can be more risky for paradoxical symptoms in older patients so would recommend trying to wean the benzodiazepines if possible gradually and slowly.  This was discussed with the patient's son on 3/30/2025.  Hyperlipidemia  Continues on atorvastatin therapy.  She takes 20 mg by mouth daily.  Encourage lifestyle modifications  Stage 3a chronic kidney disease (HCC)  Lab Results   Component Value Date    EGFR 58 03/30/2025    EGFR 52 03/29/2025    EGFR 43 01/21/2025    CREATININE 0.90 03/30/2025    CREATININE 0.98 03/29/2025    CREATININE 1.16 01/21/2025   At baseline continue to trend  Hypertension  Continue metoprolol 25 mg twice daily  Monitor blood pressures.  Currently, no concern for hypertensive encephalopathy.  Coronary artery disease involving native coronary artery of native heart without angina pectoris  [facility] in 2018 showing one-vessel CAD.  LAD 50% stenosed.  Did undergo PCI  Continue metoprolol, ASA, and Lipitor  Possible UTI (urinary tract infection)  UA with trace leukocytes.  Urine micro showing innumerable bacteria  Given AMS we decided to empirically treat with IV Rocephin on admission.  Unfortunately, the urinalysis did not automatically reflex to a urine culture so I did add a urine culture on the morning of 3/30/2025 but patient has already had 1 dose of antibiotic.  Therefore, a negative culture does not definitively rule out UTI.  I would recommend total of 5 days antibiotic.    VTE Pharmacologic Prophylaxis:   Moderate Risk (Score 3-4) - Pharmacological DVT Prophylaxis Ordered: heparin.    Mobility:   Basic Mobility Inpatient Raw Score: 16  JH-HLM Goal: 5: Stand one or more mins  JH-HLM Achieved: 6: Walk 10 steps or more  JH-HLM Goal NOT achieved. Continue with multidisciplinary rounding and encourage appropriate mobility to improve upon JH-HLM goals.    Patient Centered Rounds: I performed bedside rounds with nursing staff today.   Discussions with  Specialists or Other Care Team Provider: None    Education and Discussions with Family / Patient: Updated  (son, Zhang) via phone.    Current Length of Stay: 1 day(s)  Current Patient Status: Inpatient   Certification Statement: The patient will continue to require additional inpatient hospital stay due to evaluation and treatment of the above medical problems.  Discharge Plan: Anticipate discharge tomorrow to home.    Code Status: Level 1 - Full Code    Subjective   The patient appears to be doing better mentally today than when she came in.  She is not having significant lethargy and does have some mild confusion but when I spoke with the patient's son today he indicated that she is just about at baseline.  He is very eager to be able to get her home and when we spoke he was okay with a plan for discharge tomorrow as long as everything remains stable.  He does feel like she will do well in her own familiar surroundings.  I discussed the plan of care as detailed above.  Patient had no major events overnight.  The patient denies any abdominal pain or dysuria.  She does have some mild temperature elevations of 100.2 maximum but it is unclear if this is due to COVID or potential urinary infection.  She does have a cough but denies any shortness of breath.  She is not requiring any oxygen.    Objective :  Temp:  [97.5 °F (36.4 °C)-100.2 °F (37.9 °C)] 97.5 °F (36.4 °C)  HR:  [] 98  BP: (123-176)/(58-77) 174/63  Resp:  [14-21] 21  SpO2:  [94 %-100 %] 98 %  O2 Device: None (Room air)    Body mass index is 29.86 kg/m².     Input and Output Summary (last 24 hours):     Intake/Output Summary (Last 24 hours) at 3/30/2025 1347  Last data filed at 3/30/2025 0926  Gross per 24 hour   Intake 970 ml   Output 400 ml   Net 570 ml       Physical Exam  The patient has a mild dry cough but only coughs twice while I am in the room.  It is not an intractable cough though.  The oromucosa is slightly dry in appearance  but no pharyngeal erythema or exudates.  The patient has no cervical lymphadenopathy.  The heart is with a regular rate and rhythm with normal S1 and S2 heart sounds.  No murmurs.  The lungs are actually very clear to auscultation bilaterally and I do not hear any wheezing, rhonchi, or rales.  The abdomen is soft, nondistended and overall nontender to palpation.  This also includes the suprapubic region which is nontender as well.  The extremities are without any significant edema or calf tenderness.  No varicosities.  No rashes present over the skin.  No significant bruising.  In terms of motor strength she is symmetric bilaterally and rated as 4/5.  Sensation is grossly intact.  No facial droop noted.  No dysarthria.  The patient is oriented x 3.  She does not recall the name of the hospital but is able to tell me that she is in a hospital after looking around the room.  The patient is able to tell me that it is March and that it is 2025 and is also able to name the current president.  She is initially resting when I entered the room but does easily open eyes to verbal stimuli and maintains full attention during my entire visit without nodding back off again.    Lines/Drains:    Peripheral IV      Lab Results: I have reviewed the following results:   Results from last 7 days   Lab Units 03/30/25  0533   WBC Thousand/uL 7.60   HEMOGLOBIN g/dL 12.4   HEMATOCRIT % 38.1   PLATELETS Thousands/uL 281   SEGS PCT % 79*   LYMPHO PCT % 9*   MONO PCT % 10   EOS PCT % 0     Results from last 7 days   Lab Units 03/30/25  0533 03/29/25  1454   SODIUM mmol/L 137 136   POTASSIUM mmol/L 3.7 3.7   CHLORIDE mmol/L 102 102   CO2 mmol/L 26 24   BUN mg/dL 10 12   CREATININE mg/dL 0.90 0.98   ANION GAP mmol/L 9 10   CALCIUM mg/dL 8.9 9.2   ALBUMIN g/dL  --  4.2   TOTAL BILIRUBIN mg/dL  --  0.68   ALK PHOS U/L  --  79   ALT U/L  --  9   AST U/L  --  12*   GLUCOSE RANDOM mg/dL 103 111                       Recent Cultures (last 7 days):          Imaging Results Review: I reviewed radiology reports from this admission including: All imaging since admitted..    Last 24 Hours Medication List:     Current Facility-Administered Medications:     acetaminophen (TYLENOL) tablet 975 mg, Q8H PRN    [Held by provider] ALPRAZolam (XANAX) tablet 0.5 mg, BID PRN    aspirin (ECOTRIN LOW STRENGTH) EC tablet 81 mg, Daily    atorvastatin (LIPITOR) tablet 20 mg, Daily With Dinner    calcium carbonate (OYSTER SHELL,OSCAL) 500 mg tablet 1 tablet, Daily With Breakfast    cefTRIAXone (ROCEPHIN) IVPB (premix in dextrose) 1,000 mg 50 mL, Q24H, Last Rate: 1,000 mg (03/29/25 2039)    heparin (porcine) subcutaneous injection 5,000 Units, Q8H DAVON    [START ON 3/31/2025] levothyroxine tablet 100 mcg, Once per day on Monday Wednesday Friday    levothyroxine tablet 88 mcg, Once per day on Sunday Tuesday Thursday Saturday    metoprolol tartrate (LOPRESSOR) partial tablet 12.5 mg, Q12H DAVON    multi-electrolyte (Plasmalyte-A/Isolyte-S PH 7.4/Normosol-R) IV solution, Continuous, Last Rate: 75 mL/hr (03/29/25 2108)    multivitamin stress formula tablet 1 tablet, Daily    Administrative Statements   Today, Patient Was Seen By: Alfreod Shi DO    **Please Note: This note may have been constructed using a voice recognition system.**

## 2025-03-30 NOTE — PLAN OF CARE
Problem: NEUROSENSORY - ADULT  Goal: Achieves stable or improved neurological status  Description: INTERVENTIONS- Monitor and report changes in neurological status- Monitor vital signs such as temperature, blood pressure, glucose, and any other labs ordered - Initiate measures to prevent increased intracranial pressure- Monitor for seizure activity and implement precautions if appropriate    Outcome: Progressing  Goal: Achieves maximal functionality and self care  Description: INTERVENTIONS- Monitor swallowing and airway patency with patient fatigue and changes in neurological status- Encourage and assist patient to increase activity and self care. - Encourage visually impaired, hearing impaired and aphasic patients to use assistive/communication devices  Outcome: Progressing     Problem: RESPIRATORY - ADULT  Goal: Achieves optimal ventilation and oxygenation  Description: INTERVENTIONS:- Assess for changes in respiratory status- Assess for changes in mentation and behavior- Position to facilitate oxygenation and minimize respiratory effort- Oxygen administered by appropriate delivery if ordered- Initiate smoking cessation education as indicated- Encourage broncho-pulmonary hygiene including cough, deep breathe, Incentive Spirometry- Assess the need for suctioning and aspirate as needed- Assess and instruct to report SOB or any respiratory difficulty- Respiratory Therapy support as indicated  Outcome: Progressing     Problem: METABOLIC, FLUID AND ELECTROLYTES - ADULT  Goal: Electrolytes maintained within normal limits  Description: INTERVENTIONS:- Monitor labs and assess patient for signs and symptoms of electrolyte imbalances- Administer electrolyte replacement as ordered- Monitor response to electrolyte replacements, including repeat lab results as appropriate- Instruct patient on fluid and nutrition as appropriate  Outcome: Progressing     Problem: MUSCULOSKELETAL - ADULT  Goal: Maintain or return mobility to  safest level of function  Description: INTERVENTIONS:- Assess patient's ability to carry out ADLs; assess patient's baseline for ADL function and identify physical deficits which impact ability to perform ADLs (bathing, care of mouth/teeth, toileting, grooming, dressing, etc.)- Assess/evaluate cause of self-care deficits - Assess range of motion- Assess patient's mobility- Assess patient's need for assistive devices and provide as appropriate- Encourage maximum independence but intervene and supervise when necessary- Involve family in performance of ADLs- Assess for home care needs following discharge - Consider OT consult to assist with ADL evaluation and planning for discharge- Provide patient education as appropriate  Outcome: Progressing  Goal: Maintain proper alignment of affected body part  Description: INTERVENTIONS:- Support, maintain and protect limb and body alignment- Provide patient/ family with appropriate education  Outcome: Progressing     Problem: PAIN - ADULT  Goal: Verbalizes/displays adequate comfort level or baseline comfort level  Description: Interventions:- Encourage patient to monitor pain and request assistance- Assess pain using appropriate pain scale- Administer analgesics based on type and severity of pain and evaluate response- Implement non-pharmacological measures as appropriate and evaluate response- Consider cultural and social influences on pain and pain management- Notify physician/advanced practitioner if interventions unsuccessful or patient reports new pain  Outcome: Progressing     Problem: INFECTION - ADULT  Goal: Absence or prevention of progression during hospitalization  Description: INTERVENTIONS:- Assess and monitor for signs and symptoms of infection- Monitor lab/diagnostic results- Monitor all insertion sites, i.e. indwelling lines, tubes, and drains- Monitor endotracheal if appropriate and nasal secretions for changes in amount and color- [place] appropriate  cooling/warming therapies per order- Administer medications as ordered- Instruct and encourage patient and family to use good hand hygiene technique- Identify and instruct in appropriate isolation precautions for identified infection/condition  Outcome: Progressing  Goal: Absence of fever/infection during neutropenic period  Description: INTERVENTIONS:- Monitor WBC  Outcome: Progressing     Problem: DISCHARGE PLANNI  Goal: Discharge to home or other facility with appropriate resources  Description: INTERVENTIONS:- Identify barriers to discharge w/patient and caregiver- Arrange for needed discharge resources and transportation as appropriate- Identify discharge learning needs (meds, wound care, etc.)- Arrange for interpretive services to assist at discharge as needed- Refer to Case Management Department for coordinating discharge planning if the patient needs post-hospital services based on physician/advanced practitioner order or complex needs related to functional status, cognitive ability, or social support system  Outcome: Progressing     Problem: Knowledge Deficit  Goal: Patient/family/caregiver demonstrates understanding of disease process, treatment plan, medications, and discharge instructions  Description: Complete learning assessment and assess knowledge base.Interventions:- Provide teaching at level of understanding- Provide teaching via preferred learning methods  Outcome: Progressing

## 2025-03-30 NOTE — ASSESSMENT & PLAN NOTE
Lab Results   Component Value Date    EGFR 58 03/30/2025    EGFR 52 03/29/2025    EGFR 43 01/21/2025    CREATININE 0.90 03/30/2025    CREATININE 0.98 03/29/2025    CREATININE 1.16 01/21/2025   At baseline continue to trend

## 2025-03-30 NOTE — ASSESSMENT & PLAN NOTE
On Xanax 0.5 mg twice daily  PDMP reviewed on admission  We can restart the Xanax to avoid withdrawal but I will started at just 0.25 mg as this is how patient often takes it at home to stretch out the medication.  Continue to monitor mental status after restarting the medication.  Ultimately, benzodiazepines can be more risky for paradoxical symptoms in older patients so would recommend trying to wean the benzodiazepines if possible gradually and slowly.  This was discussed with the patient's son on 3/30/2025.

## 2025-03-30 NOTE — ASSESSMENT & PLAN NOTE
As patient is not requiring oxygen therapy currently, we are following network recommendation for mild COVID pathway  Monitor respiratory status and oxygen saturations closely.    If an oxygen requirement were to occur then patient will be placed promptly on remdesivir and dexamethasone 6 mg IV daily.  Monitor temperatures.  Tylenol for any temperature elevation/fever.  Supportive care

## 2025-03-31 VITALS
OXYGEN SATURATION: 94 % | RESPIRATION RATE: 16 BRPM | TEMPERATURE: 97.9 F | SYSTOLIC BLOOD PRESSURE: 156 MMHG | HEIGHT: 58 IN | DIASTOLIC BLOOD PRESSURE: 56 MMHG | BODY MASS INDEX: 29.99 KG/M2 | WEIGHT: 142.86 LBS | HEART RATE: 82 BPM

## 2025-03-31 LAB — BACTERIA UR CULT: NORMAL

## 2025-03-31 PROCEDURE — 99239 HOSP IP/OBS DSCHRG MGMT >30: CPT | Performed by: INTERNAL MEDICINE

## 2025-03-31 RX ORDER — ACETAMINOPHEN 500 MG
500 TABLET ORAL EVERY 6 HOURS PRN
Start: 2025-03-31

## 2025-03-31 RX ORDER — LEVOTHYROXINE SODIUM 100 UG/1
100 TABLET ORAL
Qty: 30 TABLET | Refills: 0 | Status: SHIPPED | OUTPATIENT
Start: 2025-04-01 | End: 2025-04-18 | Stop reason: SDUPTHER

## 2025-03-31 RX ORDER — CEFPODOXIME PROXETIL 200 MG/1
200 TABLET, FILM COATED ORAL 2 TIMES DAILY
Qty: 2 TABLET | Refills: 0 | Status: SHIPPED | OUTPATIENT
Start: 2025-04-01 | End: 2025-04-02

## 2025-03-31 RX ADMIN — Medication 12.5 MG: at 09:38

## 2025-03-31 RX ADMIN — Medication 1 TABLET: at 09:43

## 2025-03-31 RX ADMIN — HEPARIN SODIUM 5000 UNITS: 5000 INJECTION INTRAVENOUS; SUBCUTANEOUS at 05:01

## 2025-03-31 RX ADMIN — LEVOTHYROXINE SODIUM 100 MCG: 100 TABLET ORAL at 05:01

## 2025-03-31 RX ADMIN — ASPIRIN 81 MG: 81 TABLET, COATED ORAL at 09:38

## 2025-03-31 RX ADMIN — CALCIUM 1 TABLET: 500 TABLET ORAL at 09:38

## 2025-03-31 NOTE — PLAN OF CARE
Problem: RESPIRATORY - ADULT  Goal: Achieves optimal ventilation and oxygenation  Description: INTERVENTIONS:- Assess for changes in respiratory status- Assess for changes in mentation and behavior- Position to facilitate oxygenation and minimize respiratory effort- Oxygen administered by appropriate delivery if ordered- Initiate smoking cessation education as indicated- Encourage broncho-pulmonary hygiene including cough, deep breathe, Incentive Spirometry- Assess the need for suctioning and aspirate as needed- Assess and instruct to report SOB or any respiratory difficulty- Respiratory Therapy support as indicated  Outcome: Adequate for Discharge     Problem: METABOLIC, FLUID AND ELECTROLYTES - ADULT  Goal: Electrolytes maintained within normal limits  Description: INTERVENTIONS:- Monitor labs and assess patient for signs and symptoms of electrolyte imbalances- Administer electrolyte replacement as ordered- Monitor response to electrolyte replacements, including repeat lab results as appropriate- Instruct patient on fluid and nutrition as appropriate  Outcome: Adequate for Discharge     Problem: INFECTION - ADULT  Goal: Absence or prevention of progression during hospitalization  Description: INTERVENTIONS:- Assess and monitor for signs and symptoms of infection- Monitor lab/diagnostic results- Monitor all insertion sites, i.e. indwelling lines, tubes, and drains- Monitor endotracheal if appropriate and nasal secretions for changes in amount and color- [place] appropriate cooling/warming therapies per order- Administer medications as ordered- Instruct and encourage patient and family to use good hand hygiene technique- Identify and instruct in appropriate isolation precautions for identified infection/condition  Outcome: Adequate for Discharge  Goal: Absence of fever/infection during neutropenic period  Description: INTERVENTIONS:- Monitor WBC  Outcome: Adequate for Discharge     Problem: Knowledge  Deficit  Goal: Patient/family/caregiver demonstrates understanding of disease process, treatment plan, medications, and discharge instructions  Description: Complete learning assessment and assess knowledge base.Interventions:- Provide teaching at level of understanding- Provide teaching via preferred learning methods  Outcome: Adequate for Discharge

## 2025-03-31 NOTE — PLAN OF CARE
Problem: RESPIRATORY - ADULT  Goal: Achieves optimal ventilation and oxygenation  Description: INTERVENTIONS:- Assess for changes in respiratory status- Assess for changes in mentation and behavior- Position to facilitate oxygenation and minimize respiratory effort- Oxygen administered by appropriate delivery if ordered- Initiate smoking cessation education as indicated- Encourage broncho-pulmonary hygiene including cough, deep breathe, Incentive Spirometry- Assess the need for suctioning and aspirate as needed- Assess and instruct to report SOB or any respiratory difficulty- Respiratory Therapy support as indicated  Outcome: Progressing     Problem: METABOLIC, FLUID AND ELECTROLYTES - ADULT  Goal: Electrolytes maintained within normal limits  Description: INTERVENTIONS:- Monitor labs and assess patient for signs and symptoms of electrolyte imbalances- Administer electrolyte replacement as ordered- Monitor response to electrolyte replacements, including repeat lab results as appropriate- Instruct patient on fluid and nutrition as appropriate  Outcome: Progressing     Problem: INFECTION - ADULT  Goal: Absence or prevention of progression during hospitalization  Description: INTERVENTIONS:- Assess and monitor for signs and symptoms of infection- Monitor lab/diagnostic results- Monitor all insertion sites, i.e. indwelling lines, tubes, and drains- Monitor endotracheal if appropriate and nasal secretions for changes in amount and color- [place] appropriate cooling/warming therapies per order- Administer medications as ordered- Instruct and encourage patient and family to use good hand hygiene technique- Identify and instruct in appropriate isolation precautions for identified infection/condition  Outcome: Progressing

## 2025-03-31 NOTE — NURSING NOTE
Pt is discharge to home with son.  Review discharge information and follow up care.  Pt has all belongings with her.  No distress noted at this time.

## 2025-03-31 NOTE — ASSESSMENT & PLAN NOTE
[facility] in 2018 showing one-vessel CAD.  LAD 50% stenosed.  Did undergo PCI  Continue metoprolol, ASA, and Lipitor

## 2025-03-31 NOTE — DISCHARGE INSTR - AVS FIRST PAGE
Dear Sarah Bautista,     It was our pleasure to care for you here at [facility].  It is our hope that we were always able to exceed the expected standards for your care during your stay.  You were hospitalized due to change in mental status related to COVID infection and possible UTI (urinary tract infection) and mild dehydration.  You were cared for on the third floor under the service of Alfredo Shi DO with the [facility] who covers for your primary care physician (PCP), Brett Conde MD, while you were hospitalized.  If you have any questions or concerns related to this hospitalization, you may contact us at .  For follow up as well as medication refills, we recommend that you follow up with your primary care physician.  A registered nurse will reach out to you by phone within a few days after your discharge to answer any additional questions that you may have after going home.  However, at this time we provide for you here, the most important instructions / recommendations at discharge:     Notable Medication Adjustments -   Change your levothyroxine dosing.  Levothyroxine is your thyroid medication and you are previously taking alternating doses but given that the thyroid appears to be underactive on testing, we are recommending an increase to just 100 mcg every day.  Cefpodoxime 200 mg by mouth twice daily is recommended to finish up the antibiotic treatment course for the possible urinary tract infection based on abnormal urine sample.  Testing Required after Discharge -   Recheck TSH (thyroid level) within the next 4 weeks.  ** Please contact your PCP to request testing orders for any of the testing recommended here **  Important follow up information -   Follow-up with your primary care physician within the next 1 week.  Other Instructions -   Return to the hospital if you have fevers consistently over 101, if you develop  severe shortness of breath, or if you develop any blood in your urine.  If you develop any racing heart sensation hold your thyroid medication temporarily and reach out to your primary care physician for recommendation, especially if associated with weight loss and increased difficulties tolerating heat.  Please review this entire after visit summary as additional general instructions including medication list, appointments, activity, diet, any pertinent wound care, and other additional recommendations from your care team that may be provided for you.      Sincerely,     Alfredo Shi, DO

## 2025-03-31 NOTE — DISCHARGE SUMMARY
"Discharge Summary - Hospitalist   Name: Sarah Bautista 84 y.o. female I MRN: [id number]  Unit/Bed#: -01 I Date of Admission: 3/29/2025   Date of Service: 3/31/2025 I Hospital Day: 2     Assessment & Plan  Metabolic encephalopathy  Background: Patient brought in by son for AMS, lethargy, not acting herself.  In ED disoriented to time and place otherwise awake and alert.  Per discussion with her son patient has baseline confusion and lives with son who is her primary caretaker.  He feels that she is not too far off from her baseline.  Upon exam in ED, patient is alert to self and time but disoriented to situation.  When asked why she was here she stated \"talk to Royer over there\" while no one was else was in the room.  Differential Diagnosis -COVID brain fog, hypothyroidism, possible UTI, Xanax use prehospital,?  Mild dehydration.  Baseline Mental Status - has had some cognitive impairment at baseline and currently near baseline.  Has had some decline over some time and actually in April 2025, has a visit with neurology for cognitive assessment to evaluate for potential dementia.  Medication Review (potential causes / contributors) - Xanax is the only home medication that would typically have risk for causing delirium/encephalopathy.  Abnormal Testing Thus Far -   TSH was around 12 which is showing underactive thyroid function.  Notably, though, the free T4 was 0.76.  CT head did not show any acute infarction but there was a left Tomich hypodensity which may represent age-indeterminate infarct.  COVID antigen testing positive.  Urinalysis with innumerable bacteria and trace leukocytes.  Negative Testing Thus Far -   Sodium, glucose, calcium, creatinine are all normal  WBC  Urine drug screen only positive for benzos which is appropriate given the patient has Xanax dosing at home.  Chest x-ray shows no evidence of pneumonia  Additional Testing Being Requested -   Consider MRI brain if focal deficits occur or if " the patient is not improving.  Consultations -none currently required.  Could consider geriatrics consult if not improving.  Patient safety -   Redirection and reorientation as needed.  Bed alarm and fall precautions.  Frequent daytime interaction with staff and family.  Delirium reducing measures -   Assure proper sleep hygiene.  Avoid constipation.  Treat any pain symptoms that may be present using nondelirium inducing medications whenever possible.  Assure proper p.o. intake of food and liquid.  Serial Mental Status Exams.  PT evaluation and OT cognitive evaluation tomorrow, 3/31/2025  COVID-19  As patient is not requiring oxygen therapy currently, we are following network recommendation for mild COVID pathway  Monitor respiratory status and oxygen saturations closely.    If an oxygen requirement were to occur then patient will be placed promptly on remdesivir and dexamethasone 6 mg IV daily.  Monitor temperatures.  Tylenol for any temperature elevation/fever.  Supportive care  Other specified hypothyroidism  At baseline, the patient does have a history of hemithyroidectomy.  TSH 12.493 with normal free T4.  Prehospital Dosin mcg Tuesday, Thursday, Saturday,   100 mcg Monday, Wednesday, Friday  Inpatient dosing /discharge dosing -   Per my discussion with patient's son on 3/30/2025, the patient does take her levothyroxine without interruption.  Therefore, with the elevated TSH, I do feel that she may benefit from increasing up to 100 mcg by mouth daily and rechecking thyroid levels within the next few weeks.  ED discussed with endocrinology on admission.    Son requests referral to endocrinology after discharge  Anxiety  On Xanax 0.5 mg twice daily  PDMP reviewed on admission  We can restart the Xanax to avoid withdrawal but I will started at just 0.25 mg as this is how patient often takes it at home to stretch out the medication.  Continue to monitor mental status after restarting the  medication.  Ultimately, benzodiazepines can be more risky for paradoxical symptoms in older patients so would recommend trying to wean the benzodiazepines if possible gradually and slowly.  This was discussed with the patient's son on 3/30/2025.  Hyperlipidemia  Continues on atorvastatin therapy.  She takes 20 mg by mouth daily.  Encourage lifestyle modifications  Stage 3a chronic kidney disease (HCC)  Lab Results   Component Value Date    EGFR 58 03/30/2025    EGFR 52 03/29/2025    EGFR 43 01/21/2025    CREATININE 0.90 03/30/2025    CREATININE 0.98 03/29/2025    CREATININE 1.16 01/21/2025   At baseline continue to trend  Hypertension  Continue metoprolol 25 mg twice daily  Monitor blood pressures.  Currently, no concern for hypertensive encephalopathy.  Coronary artery disease involving native coronary artery of native heart without angina pectoris  [facility] in 2018 showing one-vessel CAD.  LAD 50% stenosed.  Did undergo PCI  Continue metoprolol, ASA, and Lipitor  Possible UTI (urinary tract infection)  UA with trace leukocytes.  Urine micro showing innumerable bacteria  Given AMS we decided to empirically treat with IV Rocephin on admission.  Unfortunately, the urinalysis did not automatically reflex to a urine culture so I did add a urine culture on the morning of 3/30/2025 but patient has already had 1 dose of antibiotic.  Therefore, a negative culture does not definitively rule out UTI.  I would recommend total of 5 days antibiotic.     Medical Problems       Resolved Problems  Date Reviewed: 6/14/2024   None       Discharging Physician / Practitioner: Alfredo Shi DO  PCP: Brett Conde MD  Admission Date:   Admission Orders (From admission, onward)       Ordered        03/29/25 1832  INPATIENT ADMISSION  Once                          Discharge Date: 03/31/25    Consultations During Hospital Stay:  None.  However, emergency room physician did speak with endocrinology while patient was in the emergency  department.    Procedures Performed:   None.    Significant Findings / Test Results:   Chest x-ray -no acute cardiopulmonary disease.  CT head -no acute hemorrhage.  Left thymic hypodensity might represent age-indeterminate infarct but uncertain.  However, no CT signs of acute infarction noted.  Urinalysis showed trace leukocytes and trace protein with innumerable bacteria and mucus threads.  Moderate epithelial cells.  Urine culture was negative, but, unfortunately the urine culture was collected after the first dose of antibiotic.  COVID antigen was positive and influenza antigen was negative.  TSH was 12.493 with a free T4 of 0.76.  Urine drug screen positive for benzos only.  Patient is on Xanax prehospital.  CMP and CBC grossly normal on 2 separate occasions checked.  Troponins negative x 2.    Incidental Findings:   CT shows left stomach hypodensity which may represent age indeterminant infarct.  However, radiologist notes that there is no acute infarction.  No specific follow-up is required for this finding but I do mention it as an incidental finding.  Of note, patient had no strokelike symptoms at all in the recent past or on admission.  Therefore, I did not have any clear clinical correlation.    Test Results Pending at Discharge (will require follow up):   None     Outpatient Tests Requested:  TSH within the next 4 weeks    Complications: None    Reason for Admission: Change in mental status    Hospital Course:   Sarah Bautista is a 84 y.o. female patient who originally presented to the hospital on 3/29/2025 due to a change in mental status.  The patient had apparently had increased lethargy prehospital and was not acting like herself.  The patient has no history of dementia but does at times get forgetful at baseline and will sometimes repeat herself.  The patient was having increased difficulty ambulating as well which is not typical for her.  The patient was evaluated in the emergency department.   She did have some abnormality on the CT head which showed a age indeterminant possible lacunar infarct but the patient had no strokelike symptoms.  There was no weakness on the initial exam or any of the others, no dysarthria, aphasia, visual changes or other symptoms to suggest a true ischemia and the radiologist did also read that there was no acute ischemic event.  Therefore, we did not place patient on stroke pathway, get MRI, or other testing at this time.  I would defer any decisions towards MRI to the outpatient setting although I do not know that it is going to significantly .  At this point, the patient had a few things that could have been causing mental status change.  First, she did appear slightly dehydrated on exam so we did give her IV fluids when she first came in and continue these up until 3/30/2025 when they were discontinued towards the afternoon.  The patient continued to eat and drink and do fine in terms of her p.o. intake so we do not feel that she would have a high risk of dehydrating quickly again.  The patient also had positivity for COVID and COVID brain fog could be another potential etiology.  For this, as the patient was not requiring oxygen, she did not meet our hospital protocol requirements for remdesivir or steroid and was just treated supportively.  Patient maintains normal oxygen saturations and pressures and is not having high fevers.  In fact, in the last 24 hours, maximum temperature has been 99.3.  The patient had an abnormal urinalysis so we did start her on ceftriaxone when she came into the hospital as there was possibly some very mild suprapubic discomfort.  However, the patient denied any dysuria, frequency, urgency, or even flank pain.  The patient had been given the ceftriaxone before obtaining urine culture so unfortunately the negative urine culture would not fully rule out urinary tract infection so I decided that with rapid resolution of even the  "suprapubic tenderness today that we should hold off on long course of antibiotic and just give her a simple 3-day treatment course for simple urinary tract infection in female.  This means that while the patient had received the ceftriaxone for the first 2 days she will need a prescription for cefpodoxime for just the final day of antibiotic treatment.  The patient was noted to have a TSH which was elevated up over 12 and she is on levothyroxine.  Therefore, it is more difficult to interpret the free T4 in that situation and typically I would use the TSH to guide therapy.  As the patient did have a change in mental status and has had some generalized fatigue/weakness for quite some time, it is very possible that the patient could be underactive with her thyroid still so I did increase up to 100 mcg by mouth daily instead of the alternating 100 mcg and 88 mcg doses that she was on previously.  I have recommended a  repeat TSH to be done within the next 4 weeks.    Please see above list of diagnoses and related plan for additional information.     Condition at Discharge: stable    Discharge Day Visit / Exam:   Subjective: No major events overnight.  Mental status actually appears better today than it even did yesterday and yesterday the patient's son had indicated that she was just about at baseline.  Discussed with patient's son today and he does feel like she is doing well and is eager to get her back home today.  The patient has had no fevers.  In fact, maximum temperature in the last 24 hours was 99.3.  Patient reports no shortness of breath and no significant cough or congestion.  She is on room air and saturating well.  Vitals: Blood Pressure: 156/56 (03/31/25 0746)  Pulse: 82 (03/31/25 0746)  Temperature: 97.9 °F (36.6 °C) (03/31/25 0746)  Temp Source: Oral (03/31/25 0746)  Respirations: 16 (03/31/25 0746)  Height: 4' 10\" (147.3 cm) (03/29/25 2007)  Weight - Scale: 64.8 kg (142 lb 13.7 oz) (03/29/25 " "2007)  SpO2: 94 % (03/31/25 0746)  Physical Exam   Patient has no cough at all while in the room today.  She does not appear or sound congested.  The oromucosa is moist in its appearance and there is no pharyngeal erythema or exudates.  The breakfast tray was still in the room when I saw her and she did eat a full breakfast.  The heart is with a regular rate and rhythm and normal S1 and S2 heart sounds.  No obvious murmurs, rubs, or gallops.  The lungs are clear to auscultation bilaterally and I actually do not hear any rales at all.  No wheezing or rhonchi.  The abdomen is soft, nondistended, nontender to palpation.  No suprapubic tenderness or fullness.  No flank tenderness.  The lower extremities are without any edema or calf tenderness.  No varicosities.  Skin is warm dry and intact.  No rashes, jaundice, or diaphoresis.  The patient does have 5/5 motor strength and is symmetric bilaterally.  Sensation appears to be grossly intact.  No dysarthria or aphasia.  No facial droop noted.  Cranial nerves grossly intact.  Patient follows all commands appropriately.  She is fully oriented.  She is able to very easily tell me that she is at [facility], that it is \"right at the end of March or the beginning of April\" and that the year is 2025 and she is able to correctly name the president.  The patient is at her baseline mental status.    Discussion with Family: Updated  (son, Zhang) via phone.    Discharge instructions/Information to patient and family:   See after visit summary for information provided to patient and family.      Provisions for Follow-Up Care:  See after visit summary for information related to follow-up care and any pertinent home health orders.      Mobility at time of Discharge:   Basic Mobility Inpatient Raw Score: 16  JH-HLM Goal: 5: Stand one or more mins  JH-HLM Achieved: 7: Walk 25 feet or more  HLM Goal achieved. Continue to encourage appropriate mobility.   "   Disposition:   Home    Planned Readmission: None    Discharge Medications:  See after visit summary for reconciled discharge medications provided to patient and/or family.      Administrative Statements   Discharge Statement:  I have spent a total time of 35 minutes in caring for this patient on the day of the visit/encounter.     **Please Note: This note may have been constructed using a voice recognition system**

## 2025-04-01 ENCOUNTER — TRANSITIONAL CARE MANAGEMENT (OUTPATIENT)
Dept: FAMILY MEDICINE CLINIC | Facility: CLINIC | Age: 85
End: 2025-04-01

## 2025-04-02 ENCOUNTER — RA CDI HCC (OUTPATIENT)
Dept: OTHER | Facility: HOSPITAL | Age: 85
End: 2025-04-02

## 2025-04-11 ENCOUNTER — TELEPHONE (OUTPATIENT)
Age: 85
End: 2025-04-11

## 2025-04-11 NOTE — TELEPHONE ENCOUNTER
Son called and is unable to bring pt to appt due a friend behind hospitalized. States his mom is doing 100% since having COVID and he's unsure if she needs to be rescheduled for this appt. Please call pt back to notify him.

## 2025-04-16 NOTE — PROGRESS NOTES
Name: Sarah Bautista      : 1940      MRN: [id number]  Encounter Provider: Brett Conde MD  Encounter Date: 2025   Encounter department: [facility]  :  Assessment & Plan  Coronary artery disease involving native coronary artery of native heart without angina pectoris  Patient to continue  with current cardiac meds to decrease risk of re stenosis. Patient to follow up with cardiology for scheduled appointments to decrease risk for further cardiac problems with appropriate diagnostic testing to reassess cardiac status. Patient had alll questions about this problem answered today.        Anxiety  Patient to continue utilizing medical therapy as well as counseling sources as applicable to condition. If suicidal thought or fear of suicide attempt, to call 911 and seek help immediately. Medications and therapy reviewed today and all patient  questions answered today.   Orders:  •  ALPRAZolam (XANAX) 0.5 mg tablet; Take 1 tablet (0.5 mg total) by mouth 2 (two) times a day as needed for anxiety    Mixed hyperlipidemia  Patient  is stable with current medication and we discussed a low fat low cholesterol diet. Weight loss also discussed for this will help lower cholesterol also. Recheck lipids in 6 months.        Primary hypertension  Patient is stable with current anti-hypertensive medicine and continue to follow a low sodium diet and take current medication. All questions about this condition were answered today.        Other specified hypothyroidism  Patient to continue current dose of thyroid medicine and recheck TSH in 6 months   Orders:  •  TSH, 3rd generation with Free T4 reflex; Future    Stage 3a chronic kidney disease (HCC)  Lab Results   Component Value Date    EGFR 58 2025    EGFR 52 2025    EGFR 43 2025    CREATININE 0.90 2025    CREATININE 0.98 2025    CREATININE 1.16 2025   Pt to avoid NSAIDs and any IV dyes. Patient to follow  up eoither with nephrology or  with us for  further  monitoring of  renal function.        Hyperlipidemia, unspecified hyperlipidemia type  Patient  is stable with current medication and we discussed a low fat low cholesterol diet. Weight loss also discussed for this will help lower cholesterol also. Recheck lipids in 6 months.   Orders:  •  atorvastatin (LIPITOR) 20 mg tablet; Take 1 tablet (20 mg total) by mouth daily    Hypothyroidism, unspecified type    Orders:  •  levothyroxine 100 mcg tablet; Take 1 tablet (100 mcg total) by mouth daily in the early morning    Dyspnea on effort    Orders:  •  metoprolol tartrate (LOPRESSOR) 25 mg tablet; Take 0.5 tablets (12.5 mg total) by mouth every 12 (twelve) hours    Abnormal stress test    Orders:  •  metoprolol tartrate (LOPRESSOR) 25 mg tablet; Take 0.5 tablets (12.5 mg total) by mouth every 12 (twelve) hours    Chronic bronchitis, unspecified chronic bronchitis type (HCC)          Depression Screening and Follow-up Plan:   Clincally patient does not have depression. No treatment is required.     Falls Plan of Care: balance, strength, and gait training instructions were provided. Home safety education provided.     Urinary Incontinence Plan of Care: counseling topics discussed: practice Kegel (pelvic floor strengthening) exercises, use restroom every 2 hours, limit alcohol, caffeine, spicy foods, and acidic foods, limiting fluid intake 3-4 hours before bed, weight loss, preventing constipation and limiting fluid intake to 60 oz. per day.     History of Present Illness   84-year-old female today for checkup for TCM visit from recent hospitalization with COVID with some encephalopathy.  Patient with some hypertension lipidemia hypothyroidism and had her thyroid medicine adjusted but she was in the hospital and we will recheck her TSH in approximately 2 months.  Patient accompanied by son and we are going to see about getting her set up for Folstein exam evaluation in  "the office with to see about evaluating her with her memory issues.  Patient has refills on all of her medications and that was done for today.  Son is very doting on his mother and I do not have any issues with her safety she does have a life alert that she has in case of any kind of cause problems with her falling she does not really like that but this is actually good thing for her to have.  Will see her back to get her set up for a Folstein evaluation for mental status evaluation.      TCM Call (since 4/4/2025)     None      TCM Call (since 4/4/2025)     None         Review of Systems   Constitutional:  Negative for activity change, appetite change, fatigue and fever.   HENT:  Negative for congestion, ear pain, postnasal drip, rhinorrhea, sinus pressure, sinus pain, sneezing and sore throat.    Eyes:  Negative for pain and redness.   Respiratory:  Negative for apnea, cough, chest tightness, shortness of breath and wheezing.    Cardiovascular:  Negative for chest pain, palpitations and leg swelling.   Gastrointestinal:  Negative for abdominal pain, constipation, diarrhea, nausea and vomiting.   Endocrine: Negative for cold intolerance and heat intolerance.   Genitourinary:  Negative for difficulty urinating, dysuria, frequency, hematuria and urgency.   Musculoskeletal:  Negative for arthralgias, back pain, gait problem and myalgias.   Skin:  Negative for rash.   Neurological:  Negative for dizziness, speech difficulty, weakness, numbness and headaches.   Hematological:  Does not bruise/bleed easily.   Psychiatric/Behavioral:  Negative for agitation, confusion and hallucinations.        Objective   /86 (BP Location: Left arm, Patient Position: Sitting, Cuff Size: Standard)   Pulse 94   Temp 98.4 °F (36.9 °C) (Skin)   Ht 4' 10\" (1.473 m)   Wt 63 kg (139 lb)   LMP  (LMP Unknown)   SpO2 98%   BMI 29.05 kg/m²      Physical Exam    "

## 2025-04-16 NOTE — ASSESSMENT & PLAN NOTE
Lab Results   Component Value Date    EGFR 58 03/30/2025    EGFR 52 03/29/2025    EGFR 43 01/21/2025    CREATININE 0.90 03/30/2025    CREATININE 0.98 03/29/2025    CREATININE 1.16 01/21/2025   Pt to avoid NSAIDs and any IV dyes. Patient to follow up eoither with nephrology or  with us for  further  monitoring of  renal function.

## 2025-04-16 NOTE — ASSESSMENT & PLAN NOTE
Patient to continue utilizing medical therapy as well as counseling sources as applicable to condition. If suicidal thought or fear of suicide attempt, to call 911 and seek help immediately. Medications and therapy reviewed today and all patient  questions answered today.   Orders:  •  ALPRAZolam (XANAX) 0.5 mg tablet; Take 1 tablet (0.5 mg total) by mouth 2 (two) times a day as needed for anxiety

## 2025-04-18 ENCOUNTER — OFFICE VISIT (OUTPATIENT)
Dept: FAMILY MEDICINE CLINIC | Facility: CLINIC | Age: 85
End: 2025-04-18
Payer: MEDICARE

## 2025-04-18 VITALS
DIASTOLIC BLOOD PRESSURE: 86 MMHG | OXYGEN SATURATION: 98 % | BODY MASS INDEX: 29.18 KG/M2 | WEIGHT: 139 LBS | HEART RATE: 94 BPM | SYSTOLIC BLOOD PRESSURE: 138 MMHG | TEMPERATURE: 98.4 F | HEIGHT: 58 IN

## 2025-04-18 DIAGNOSIS — E03.8 OTHER SPECIFIED HYPOTHYROIDISM: ICD-10-CM

## 2025-04-18 DIAGNOSIS — E78.5 HYPERLIPIDEMIA, UNSPECIFIED HYPERLIPIDEMIA TYPE: ICD-10-CM

## 2025-04-18 DIAGNOSIS — R06.09 DYSPNEA ON EFFORT: ICD-10-CM

## 2025-04-18 DIAGNOSIS — N18.31 STAGE 3A CHRONIC KIDNEY DISEASE (HCC): ICD-10-CM

## 2025-04-18 DIAGNOSIS — E03.9 HYPOTHYROIDISM, UNSPECIFIED TYPE: ICD-10-CM

## 2025-04-18 DIAGNOSIS — I10 PRIMARY HYPERTENSION: ICD-10-CM

## 2025-04-18 DIAGNOSIS — J42 CHRONIC BRONCHITIS, UNSPECIFIED CHRONIC BRONCHITIS TYPE (HCC): ICD-10-CM

## 2025-04-18 DIAGNOSIS — I25.10 CORONARY ARTERY DISEASE INVOLVING NATIVE CORONARY ARTERY OF NATIVE HEART WITHOUT ANGINA PECTORIS: Primary | ICD-10-CM

## 2025-04-18 DIAGNOSIS — R94.39 ABNORMAL STRESS TEST: ICD-10-CM

## 2025-04-18 DIAGNOSIS — F41.9 ANXIETY: ICD-10-CM

## 2025-04-18 DIAGNOSIS — E78.2 MIXED HYPERLIPIDEMIA: ICD-10-CM

## 2025-04-18 PROCEDURE — 99495 TRANSJ CARE MGMT MOD F2F 14D: CPT | Performed by: FAMILY MEDICINE

## 2025-04-18 RX ORDER — ATORVASTATIN CALCIUM 20 MG/1
20 TABLET, FILM COATED ORAL DAILY
Qty: 90 TABLET | Refills: 3 | Status: SHIPPED | OUTPATIENT
Start: 2025-04-18

## 2025-04-18 RX ORDER — LEVOTHYROXINE SODIUM 100 UG/1
100 TABLET ORAL
Qty: 30 TABLET | Refills: 0 | Status: SHIPPED | OUTPATIENT
Start: 2025-04-18

## 2025-04-18 RX ORDER — ALPRAZOLAM 0.5 MG
0.5 TABLET ORAL 2 TIMES DAILY PRN
Qty: 180 TABLET | Refills: 5 | Status: SHIPPED | OUTPATIENT
Start: 2025-04-18

## 2025-04-18 RX ORDER — METOPROLOL TARTRATE 25 MG/1
12.5 TABLET, FILM COATED ORAL EVERY 12 HOURS SCHEDULED
Qty: 90 TABLET | Refills: 1 | Status: SHIPPED | OUTPATIENT
Start: 2025-04-18

## 2025-04-18 NOTE — ASSESSMENT & PLAN NOTE
Orders:  •  metoprolol tartrate (LOPRESSOR) 25 mg tablet; Take 0.5 tablets (12.5 mg total) by mouth every 12 (twelve) hours

## 2025-04-18 NOTE — ASSESSMENT & PLAN NOTE
Patient  is stable with current medication and we discussed a low fat low cholesterol diet. Weight loss also discussed for this will help lower cholesterol also. Recheck lipids in 6 months.   Orders:  •  atorvastatin (LIPITOR) 20 mg tablet; Take 1 tablet (20 mg total) by mouth daily

## 2025-04-23 ENCOUNTER — OFFICE VISIT (OUTPATIENT)
Dept: CARDIOLOGY CLINIC | Facility: CLINIC | Age: 85
End: 2025-04-23
Payer: MEDICARE

## 2025-04-23 VITALS
BODY MASS INDEX: 29.05 KG/M2 | WEIGHT: 139 LBS | SYSTOLIC BLOOD PRESSURE: 153 MMHG | OXYGEN SATURATION: 98 % | DIASTOLIC BLOOD PRESSURE: 81 MMHG | HEART RATE: 71 BPM

## 2025-04-23 DIAGNOSIS — I25.10 CORONARY ARTERY DISEASE INVOLVING NATIVE CORONARY ARTERY OF NATIVE HEART WITHOUT ANGINA PECTORIS: Primary | ICD-10-CM

## 2025-04-23 DIAGNOSIS — E78.2 MIXED HYPERLIPIDEMIA: ICD-10-CM

## 2025-04-23 DIAGNOSIS — R06.09 DYSPNEA ON EFFORT: ICD-10-CM

## 2025-04-23 DIAGNOSIS — I10 PRIMARY HYPERTENSION: ICD-10-CM

## 2025-04-23 DIAGNOSIS — N18.31 STAGE 3A CHRONIC KIDNEY DISEASE (HCC): ICD-10-CM

## 2025-04-23 PROCEDURE — 99214 OFFICE O/P EST MOD 30 MIN: CPT | Performed by: NURSE PRACTITIONER

## 2025-04-23 RX ORDER — ASPIRIN 81 MG/1
81 TABLET ORAL DAILY
Qty: 90 TABLET | Refills: 3 | Status: SHIPPED | OUTPATIENT
Start: 2025-04-23

## 2025-04-23 RX ORDER — AMLODIPINE BESYLATE 2.5 MG/1
2.5 TABLET ORAL DAILY
Qty: 90 TABLET | Refills: 3 | Status: SHIPPED | OUTPATIENT
Start: 2025-04-23

## 2025-04-23 NOTE — PROGRESS NOTES
Cardiology  Follow Up   Office Visit Note  Sarah Bautista   84 y.o.   female   MRN: [id number]  [facility]  [address]  [address]  [facility] [address] [phone]    PCP: Brett Conde MD  Cardiologist: Dr. Zhang          Summary of Plan:  Heart healthy diet: Mediterranean or DASH.  Education provided  Fasting lipid profile  Add amlodipine 2.5 mg/d  Restart ASA 81 mg/d  Follow-up with Dr. Zhang: 1 year        Assessment/Plan  Nonobstructive CAD: S/p PCI 2018-residual nonobstructive disease of the LAD, medically managed  On aspirin 81, atorvastatin, beta-blocker.  She has been inconsistently taking the aspirin  No angina  Hypertension.  Her blood pressure is elevated 153/81.  It has been elevated in the past as well she is on a low-dose beta-blocker.  Will add amlodipine 2.5 mg daily  Preserved LV function by echo 2018  Hyperlipidemia, mixed  On atorvastatin 20 mg daily  6/26/2023 calculated LDL 70 non-HDL 86: At goal  Due for repeat labs.  I reminded her today  CKD 3 a  baseline creatinine 1.0, at baseline  Cardiac testing  SPECT 9/2018:IMPRESSIONS: Abnormal study. There was a moderate amount of partially reversible defect of anterior wall suspicious for ischemia in the anterior region vs breast attenuation. Left ventricular systolic function was normal.   [facility] 2018  --  There was 1-vessel coronary artery disease ( 50 % LAD).  --  Left main: The vessel was normal sized. Angiography showed no evidence of disease.  --  LAD: The vessel was normal sized and moderately tortuous. Angiography showed minor luminal irregularities.  --  Mid LAD: The vessel was normal sized and moderately tortuous. Angiography showed a single discrete lesion.  --  Circumflex: The vessel was normal sized. Angiography showed minor luminal irregularities.  --  Mid circumflex: The vessel was small sized.  --  Distal circumflex: The vessel was small sized.  --  1st obtuse marginal: The vessel was  normal sized and mildly tortuous. Angiography showed minor luminal irregularities.  --  2nd obtuse marginal: The vessel was small sized.  --  Ramus intermedius: The vessel was small to medium sized. Angiography showed minor luminal irregularities.  --  RCA: The vessel was normal sized and mildly tortuous. Angiography showed minor luminal irregularities.  Intervention results: PCI 50% stenosis mid LAD. Appearance unimproved with 50 % residual stenosis.  TTE 9/2018.:  EF 65%.  No RWMA.                            HPI  Sarah Bautista is a 84 y.o.year old female with CAD status post PCI of the LAD 2018, HLD, CKD 3A.  After PCI she had residual 50% nonobstructive LAD for which medical management was recommended .LV function has been preserved.  She follows in the office with Dr. Zhang.  She was last seen  1/10/2024.      1/10/2024 OV Dr. Zhang  Per his note    Interval History: Followup CAD.     She has no chest pain. She has some dyspnea with moderate exertion that is minor. No palpitations.       Coronary Artery Disease: Hx of nonobstructive CAD of LAD. No prior hx of PCI. Continue with aspirin and atorvastatin. LDL is well controlled.      Hypertension: She has been on metoprolol. BP is high today. She will monitor her         ADM 3/29 - 3/31/2025  CC: Change in mental status  CTH: Age-indeterminate possible lacunar infarct; no acute findings  Treated for dehydration  Was positive for COVID-was treated conservatively, did not require remdesivir or steroids  Treated with short course of antibiotics for a possible UTI  TSH 12--> levothyroxine increased  No cardiac issues    4/23/2025  Routine cardiology follow-up.  She is  acc by her son who adds to history  +SOB   153/81  Current meds metoprolol 12.5 mg every 12, Lipitor 20.  Inconsistently taking aspirin  Exam unremarkable other than some memory loss  Will add amlodipine 2.5 mg daily, add back aspirin 81 daily  Encouraged her to get a fasting lipid profile                Review of Systems   Constitutional: Negative for chills.   Cardiovascular:  Positive for dyspnea on exertion. Negative for chest pain, claudication, cyanosis, irregular heartbeat, leg swelling, near-syncope, orthopnea, palpitations, paroxysmal nocturnal dyspnea and syncope.   Respiratory:  Negative for cough and shortness of breath.    Gastrointestinal:  Negative for heartburn and nausea.   Neurological:  Negative for dizziness, focal weakness, headaches, light-headedness and weakness.   All other systems reviewed and are negative.            Assessment  Diagnoses and all orders for this visit:    Coronary artery disease involving native coronary artery of native heart without angina pectoris    Stage 3a chronic kidney disease (HCC)    Mixed hyperlipidemia  -     Lipid panel; Future  -     Lipid panel    Primary hypertension  -     amLODIPine (NORVASC) 2.5 mg tablet; Take 1 tablet (2.5 mg total) by mouth daily    Dyspnea on effort  -     aspirin 81 mg EC tablet; Take 1 tablet (81 mg total) by mouth daily        Past Medical History:   Diagnosis Date    Anxiety     Back pain     Disease of thyroid gland     Diverticulitis     Diverticulosis     Fibroid     GERD (gastroesophageal reflux disease)     Heart murmur     History of mammogram 11/01/2017    Hypertension     Hypothyroidism     Osteoporosis     Shortness of breath        Past Surgical History:   Procedure Laterality Date    APPENDECTOMY      COLONOSCOPY  2013    EYE SURGERY Right 07/05/2017    catarect surgery     EYE SURGERY Left 05/24/2017    catarect surgery     HEMORRHOID SURGERY      MAMMO (HISTORICAL)  11/01/2017    THYROID SURGERY      TOTAL ABDOMINAL HYSTERECTOMY      TUBAL LIGATION             Allergies  No Known Allergies      Medications    Current Outpatient Medications:     ALPRAZolam (XANAX) 0.5 mg tablet, Take 1 tablet (0.5 mg total) by mouth 2 (two) times a day as needed for anxiety, Disp: 180 tablet, Rfl: 5    amLODIPine  (NORVASC) 2.5 mg tablet, Take 1 tablet (2.5 mg total) by mouth daily, Disp: 90 tablet, Rfl: 3    aspirin 81 mg EC tablet, Take 1 tablet (81 mg total) by mouth daily, Disp: 90 tablet, Rfl: 3    atorvastatin (LIPITOR) 20 mg tablet, Take 1 tablet (20 mg total) by mouth daily, Disp: 90 tablet, Rfl: 3    Calcium Carbonate-Vit D-Min (CALTRATE 600+D PLUS MINERALS) 600-800 MG-UNIT TABS, Take by mouth daily  , Disp: , Rfl:     levothyroxine 100 mcg tablet, Take 1 tablet (100 mcg total) by mouth daily in the early morning, Disp: 30 tablet, Rfl: 0    MAGNESIUM PO, Take by mouth daily  , Disp: , Rfl:     metoprolol tartrate (LOPRESSOR) 25 mg tablet, Take 0.5 tablets (12.5 mg total) by mouth every 12 (twelve) hours, Disp: 90 tablet, Rfl: 1    Multiple Vitamins-Minerals (PreserVision AREDS 2) CAPS, Take by mouth, Disp: , Rfl:     acetaminophen (TYLENOL) 500 mg tablet, Take 1 tablet (500 mg total) by mouth every 6 (six) hours as needed for mild pain, headaches or fever (Patient not taking: Reported on 2025), Disp: , Rfl:       Social History     Socioeconomic History    Marital status: /Civil Union     Spouse name: Not on file    Number of children: Not on file    Years of education: 12    Highest education level: Not on file   Occupational History    Not on file   Tobacco Use    Smoking status: Former     Types: Cigarettes     Passive exposure: Past    Smokeless tobacco: Never    Tobacco comments:     as per [place]   Vaping Use    Vaping status: Never Used   Substance and Sexual Activity    Alcohol use: Not Currently    Drug use: No    Sexual activity: Not Currently     Birth control/protection: Surgical     Comment: NAN-NISHANT    Other Topics Concern    Not on file   Social History Narrative    · Most recent tobacco use screenin2020     · Do you currently or have you served in the [place] Armed [employer]:   No      · Were you activated, into active duty, as a member of the [employer] or as a Reservist:   No       · Exercise level:   Occasional      · Diet:   Regular      · General stress level:   Medium      · Caffeine intake:   Moderate      · Advance directive:   Yes      · Has the Patient had a mammogram to screen for breast cancer within 24 months:   Yes      · Please enter the date of the Patient's previous mammogram.:   2017      · Date of last Colonoscopy:   2013     - As per [place]     ·      Social Drivers of Health     Financial Resource Strain: Low Risk  (2023)    Overall Financial Resource Strain (CARDIA)     Difficulty of Paying Living Expenses: Not very hard   Food Insecurity: No Food Insecurity (3/30/2025)    Nursing - Inadequate Food Risk Classification     Worried About Running Out of Food in the Last Year: Not on file     Ran Out of Food in the Last Year: Not on file     Ran Out of Food in the Last Year: Never true   Transportation Needs: No Transportation Needs (3/30/2025)    Nursing - Transportation Risk Classification     Lack of Transportation: Not on file     Lack of Transportation: No   Physical Activity: Not on file   Stress: Not on file   Social Connections: Unknown (2024)    Received from [employer]    Social [employer]     How often do you feel lonely or isolated from those around you? (Adult - for ages 18 years and over): Not on file   Intimate Partner Violence: Unknown (3/30/2025)    Nursing IPS     Feels Physically and Emotionally Safe: Not on file     Physically Hurt by Someone: Not on file     Humiliated or Emotionally Abused by Someone: Not on file     Physically Hurt by Someone: No     Hurt or Threatened by Someone: No   Housing Stability: Unknown (3/30/2025)    Nursing: Inadequate Housing Risk Classification     Has Housing: Not on file     Worried About Losing Housing: Not on file     Unable to Get Utilities: Not on file     Unable to Pay for Housing in the Last Year: No     Has Housin       Family History   Problem Relation Age of Onset    Hypertension Father   "   Asthma Son     Arrhythmia Son        Physical Exam  Vitals and nursing note reviewed.   Constitutional:       General: She is not in acute distress.     Appearance: She is not diaphoretic.   HENT:      Head: Normocephalic and atraumatic.   Eyes:      Conjunctiva/sclera: Conjunctivae normal.   Cardiovascular:      Rate and Rhythm: Normal rate and regular rhythm.      Pulses: Intact distal pulses.      Heart sounds: Normal heart sounds.   Pulmonary:      Effort: Pulmonary effort is normal.      Breath sounds: Normal breath sounds.   Abdominal:      General: Bowel sounds are normal.      Palpations: Abdomen is soft.   Musculoskeletal:         General: Normal range of motion.      Cervical back: Normal range of motion and neck supple.   Skin:     General: Skin is warm and dry.   Neurological:      Mental Status: She is alert and oriented to person, place, and time.         Vitals: Blood pressure 153/81, pulse 71, weight 63 kg (139 lb), SpO2 98%, not currently breastfeeding.   Wt Readings from Last 3 Encounters:   25 63 kg (139 lb)   25 63 kg (139 lb)   25 64.8 kg (142 lb 13.7 oz)           Labs & Results:  Lab Results   Component Value Date    WBC 7.60 2025    HGB 12.4 2025    HCT 38.1 2025    MCV 96 2025     2025     No results found for: \"BNP\"  No components found for: \"CHEM\"  No results found for: \"CKTOTAL\", \"TROPONINI\", \"TROPONINT\", \"CKMBINDEX\"  Results for orders placed during the hospital encounter of 18    Echo complete with contrast if indicated    [facility]  [address] [place] [address] [phone]    Transthoracic Echocardiogram  2D, M-mode, and Color Doppler    Study date:  06-Sep-2018    Patient: DYLAN ALVA  MR number: [id number]  Account number: [id number]  : 1940  Age: 77 years  Gender: Female  Status: Outpatient  Location: [facility]  Height: 59 in  Weight: 139 " lb  BP: 128/ 52 mmHg    Indications: Dyspnea.    Diagnoses: R06.09 - Other forms of dyspnea    Sonographer:  Betancourt RCS  Interpreting Physician:  Sagar Kaufman DO  Primary Physician:  Brett Conde MD  Referring Physician:  Savannah Tovar MD  Group:  [facility]    IMPRESSIONS:  Normal left ventricular size and function.  Normal right ventricular size and function.  No significant valvular abnormality.  No etiology for dyspnea identified.  No change compared to TTE on 3/1/2016.    SUMMARY    LEFT VENTRICLE:  Systolic function was normal by visual assessment. Ejection fraction was estimated to be 65 %.  There were no regional wall motion abnormalities.    COMPARISONS:  Compared to prior TTE of 3/1/2016, no significant change was found.    PROCEDURE: The study was performed in the [facility]. This was a routine study. The transthoracic approach was used. The study included complete 2D imaging, M-mode, and color Doppler. The heart rate was 69 bpm, at the  start of the study. Images were obtained from the parasternal, apical, subcostal, and suprasternal notch acoustic windows. Intravenous contrast ( 1.2mL Definity in NSS) was administered to opacify the left ventricle. Echocardiographic  views were limited due to poor acoustic window availability, decreased penetration, and lung interference. This was a technically difficult study.    LEFT VENTRICLE: Size was normal. Systolic function was normal by visual assessment. Ejection fraction was estimated to be 65 %. There were no regional wall motion abnormalities. DOPPLER: Left ventricular diastolic function parameters were  normal for the patient's age.    RIGHT VENTRICLE: The size was normal. Systolic function was normal.    LEFT ATRIUM: Size was normal.    RIGHT ATRIUM: Size was normal.    MITRAL VALVE: Valve structure was normal. There was normal leaflet separation. DOPPLER: The transmitral velocity was within  the normal range. There was no evidence for stenosis. There was no regurgitation.    AORTIC VALVE: The valve was trileaflet. Leaflets exhibited normal thickness and normal cuspal separation. DOPPLER: Transaortic velocity was within the normal range. There was no evidence for stenosis. There was no regurgitation.    TRICUSPID VALVE: The valve structure was normal. There was normal leaflet separation. DOPPLER: The transtricuspid velocity was within the normal range. There was no evidence for stenosis. There was no regurgitation.    PULMONIC VALVE: Leaflets exhibited normal thickness, no calcification, and normal cuspal separation. DOPPLER: The transpulmonic velocity was within the normal range. There was no regurgitation.    PERICARDIUM: There was no pericardial effusion. The pericardium was normal in appearance.    AORTA: The root exhibited normal size.    SYSTEMIC VEINS: IVC: The inferior vena cava was normal in size. Respirophasic changes were normal.    SYSTEM MEASUREMENT TABLES    Apical four chamber  TAPSE: 17 mm    Unspecified Scan Mode  Aortic Root Diameter; End Systole;: 29.1 mm  Aortic valve Area; Continuity Equation by Velocity Time Integral; Systole;: 3.84 cm2  Cardiovascular Orifice Diameter; End Systole;: 21.9 mm  Gradient Pressure, Peak; Simplified Bernoulli; Antegrade Flow; Systole;: 7.5 mm[Hg]  Gradient pressure, average; Simplified Bernoulli; Antegrade Flow; Systole;: 4.3 mm[Hg]  Left Atrium to Aortic Root Ratio;: 1.06  Left atrial diameter; End Diastole;: 30.8 mm  Cardiac Output; Teichholz; Systole;: 2.18 L/min  Heart rate; Teichholz;: 74 /min  Interventricular Septum Diastolic Thickness; Teichholz; End Diastole;: 11.8 mm  Left Ventricle Internal End Diastolic Dimension; Teichholz;: 33.8 mm  Left Ventricle Internal Systolic Dimension; Teichholz; End Systole;: 22.6 mm  Left Ventricle Mass; Mass AVCube with Teichholz; End Diastole;: 94 g  Left Ventricle Posterior Wall Diastolic Thickness; Teichholz;  End Diastole;: 7.7 mm  Left Ventricular Ejection Fraction; Teichholz;: 63 %  Left Ventricular End Diastolic Volume; Teichholz;: 46.8 ml  Left Ventricular End Systolic Volume; Teichholz;: 17.3 ml  Left Ventricular Fractional Shortening;: 33.1 %  Stroke volume; Teichholz; Systole;: 29.5 ml  Mitral Valve Area; Area by Pressure Half-Time; Systole;: 3.49 cm2  Mitral Valve E to A Ratio; Systole;: 0.82  Pressure half time; Diastole;: 0.06 s    [facility]    Prepared and electronically signed by    Sagar Kaufman DO  Signed 06-Sep-2018 19:31:06    No results found for this or any previous visit.    No valid procedures specified.  No results found for this or any previous visit.                This note was completed in part utilizing [employer] direct voice recognition software.   Grammatical errors, random word insertion, spelling mistakes, and incomplete sentences may be an occasional consequence of the system secondary to software limitations, ambient noise and hardware issues. At the time of dictation, efforts were made to edit, clarify and /or correct errors.  Please read the chart carefully and recognize, using context, where substitutions have occurred.  If you have any questions or concerns about the context, text or information contained within the body of this dictation, please contact myself, the provider, for further clarification

## 2025-04-23 NOTE — PATIENT INSTRUCTIONS
"Patient Education     DASH diet   The Basics   Written by the doctors and editors at [facility]   What is the DASH diet? -- DASH stands for \"dietary approaches to stop hypertension.\" It is an eating plan that can help lower blood pressure. It can also help prevent high blood pressure, which doctors call \"hypertension.\" You don't need special foods or recipes to follow the DASH diet. It is more about eating certain types of foods in certain amounts.  The DASH diet has lots of fruits and vegetables, whole grains, lean meats, healthy fats, and low-fat or fat-free dairy products (figure 1). It is low in saturated fats, trans fats, cholesterol, added sugars, and sodium (salt).  The standard DASH diet limits sodium to no more than 2300 mg a day. Your doctor or nurse can talk to you about what your specific goals should be.  Why do I need the DASH diet? -- The DASH diet can help you:   Lower your blood pressure and cholesterol   Lower your risk for cancer, heart disease, heart attack, and stroke. It might also lower your risk for heart failure, kidney stones, and diabetes.   Lose weight or keep a healthy weight  What can I eat and drink on the DASH diet? -- Below are some guidelines and examples for your daily and weekly nutrition goals. These are based on a 2000-calorie-per-day eating plan.  Daily goals:   Grains - Try to eat 6 to 8 servings of whole-grain, high-fiber foods each day. Examples of a serving include 1 slice of bread, 1 ounce (30 grams) of dry cereal, or 1/2 cup (120 grams) of cooked cereal, pasta, or brown rice.   Fruits - Try to eat 4 to 5 servings of fruit each day. Examples of a serving include 1 medium fruit or 1/2 cup (75 grams) of fresh, frozen, or canned fruit. Try to eat different kinds and colors. Frozen or canned fruit should not have added sugar. Look for frozen or canned fruits with 100 percent fruit juice or water.   Vegetables - Try to eat 4 to 5 servings of vegetables each day. Examples of a " "serving include 1 cup (40 grams) of leafy greens or 1/2 cup (75 grams) of fresh or cooked vegetables. Try to pick many kinds and colors. If you buy canned vegetables, look for \"low sodium\" or \"salt free.\" Buy plain, frozen vegetables to avoid added fat and sodium.   Dairy - Try to eat 2 to 3 servings of fat-free or low-fat milk products each day. Examples of a serving include 1 cup (240 mL) of milk or yogurt or 1.5 ounces (45 grams) of cheese.   Lean meats, poultry, and seafood - Try to eat 6 or fewer servings of lean meat, poultry, and seafood each day. Examples of a serving include 1 egg or 1 ounce (30 grams) of cooked meat, poultry, or fish. Try to choose more low-fat or lean meats like chicken, fish, or turkey. Eat less red meat.   Fats and oils - Try to eat 2 to 3 servings of fats and oils each day. Examples of a serving include 1 teaspoon (5 mL) of soft margarine or vegetable oil, or 1 tablespoon (18 grams) of mayonnaise. Eat healthy fats like those found in fish, nuts, and avocados. Try using olive oil or vegetable oils such as canola oil. You can also try corn, safflower, sunflower, or soybean oils. Use low-sodium and low-fat salad dressing and mayonnaise.  Weekly goals:   Nuts, seeds, and legumes (dry beans and peas) - Try to eat 4 to 5 servings each week. Examples of a serving include 1/3 cup (45 grams) of nuts, 2 tablespoons (50 grams) of nut butter or seeds, or 1/2 cup (75 grams) of cooked legumes. Try almonds and walnuts, sunflower seeds, peanut or other nut butters, soybeans, lentils, kidney beans, and split peas.   Sweets - Try to eat fewer than 5 servings each week. Examples of a serving include 1 tablespoon (14 grams) of sugar or jelly, or 1/2 cup (120 grams) of gelatin. Choose low-fat and trans fat-free desserts. These include fruit-flavored gelatin, sorbet, jellybeans, yumiko crackers, animal crackers, low-fat fig bars, and anita snaps. Eat fruit to satisfy the desire for sweets.  To add flavor, " use pepper, herbs, spices, vinegar, or lemon or lime juices. Choose low-sodium or salt-free products whenever you can. This is especially important for foods like broths, soups, or soy sauce.  What foods and drinks should I avoid on the DASH diet?    Grains to avoid - Salted breads, rolls, crackers, quick breads, self-rising flours, biscuit mixes, regular breadcrumbs, instant hot cereals, commercially prepared rice, pasta, stuffing mixes.   Fruits and vegetables to avoid - Store-bought prepared potatoes and vegetable mixes, regular canned vegetables and juices, vegetables frozen with sauce, pickled vegetables, processed fruits with salt or sodium.   Dairy products to avoid - Whole milk, malted milk, chocolate milk, buttermilk, full-fat cheese, ice cream.   Meats to avoid - Smoked, cured, salted, or canned fish such as sardines or anchovies. High-fat cuts of meat like beef, lamb, pork, veras and sausage, and chicken with the skin on it.   Fats and oils to avoid - Eat fewer solid fats like butter, lard, and hard stick margarine. Eat less saturated fat, trans fat, and total fat.   Condiments and snacks to avoid - Salted and canned peas, beans, and olives. Salted snack foods, fried foods, soda, other sweetened drinks.   Sweets to avoid - High-fat baked goods such as muffins, donuts, pastries, and commercial baked goods. Candy bars.   Alcohol - If you choose to drink alcohol, limit the amount. Most doctors recommend limiting alcohol to no more than 1 drink a day (for females) or 2 drinks a day (for males).  What else do I need to know?    Get regular physical activity to make this diet help you even more. Even gentle forms of activity, like walking, are good for your health.   Try baking or broiling instead of frying foods.   Write down the foods that you eat. This will help you track what you have eaten each week.   When you go to the grocery store, have a list or a meal plan. Don't shop when you are hungry, since this  might lead you to buy more unhealthy foods.   Read food labels with care (figure 2). They show you how much is in a serving. The amount is given as a percentage of the total amount that you need each day. Reading labels helps you make healthy food choices.  All topics are updated as new evidence becomes available and our peer review process is complete.  This topic retrieved from [employer] on: Feb 26, 2024.  Topic 958203 Version 1.0  Release: 32.2.4 - C32.56  © 2024 [employer] and/or its affiliates. All rights reserved.  figure 1: DASH diet     Graphic 628912 Version 1.0  figure 2: Food label     Graphic [id number] Version 1.0  Consumer Information Use and Disclaimer   Disclaimer: This generalized information is a limited summary of diagnosis, treatment, and/or medication information. It is not meant to be comprehensive and should be used as a tool to help the user understand and/or assess potential diagnostic and treatment options. It does NOT include all information about conditions, treatments, medications, side effects, or risks that may apply to a specific patient. It is not intended to be medical advice or a substitute for the medical advice, diagnosis, or treatment of a health care provider based on the health care provider's examination and assessment of a patient's specific and unique circumstances. Patients must speak with a health care provider for complete information about their health, medical questions, and treatment options, including any risks or benefits regarding use of medications. This information does not endorse any treatments or medications as safe, effective, or approved for treating a specific patient. [employer] and its affiliates disclaim any warranty or liability relating to this information or the use thereof.The use of this information is governed by the Terms of Use, available at https://www.[employer].com/en/know/clinical-effectiveness-terms. 2024© [employer] and its  affiliates and/or licensors. All rights reserved.  Copyright   © 2024 [employer] and/or its affiliates. All rights reserved.

## 2025-04-23 NOTE — LETTER
April 23, 2025     Brett Conde MD  [address] [facility] [address]    Patient: Sarah Bautista   YOB: 1940   Date of Visit: 4/23/2025       Dear Dr. Brett Conde MD:    Thank you for referring Sarah Bautista to me for evaluation. Below are my notes for this consultation.    If you have questions, please do not hesitate to call me. I look forward to following your patient along with you.         Sincerely,        DARVIN Ch        CC: No Recipients    DARVIN Ch  4/23/2025  4:33 PM  Sign when Signing Visit  Cardiology  Follow Up   Office Visit Note  Sarah Bautista   84 y.o.   female   MRN: [id number]  [facility] [place]  [address] [facility] [address]  [address]  [facility] [address]  [phone]    PCP: Brett Conde MD  Cardiologist: Dr. Zhang          Summary of Plan:  Heart healthy diet: Mediterranean or DASH.  Education provided  Fasting lipid profile  Add amlodipine 2.5 mg/d  Restart ASA 81 mg/d  Follow-up with Dr. Zhang: 1 year        Assessment/Plan  Nonobstructive CAD: S/p PCI 2018-residual nonobstructive disease of the LAD, medically managed  On aspirin 81, atorvastatin, beta-blocker.  She has been inconsistently taking the aspirin  No angina  Hypertension.  Her blood pressure is elevated 153/81.  It has been elevated in the past as well she is on a low-dose beta-blocker.  Will add amlodipine 2.5 mg daily  Preserved LV function by echo 2018  Hyperlipidemia, mixed  On atorvastatin 20 mg daily  6/26/2023 calculated LDL 70 non-HDL 86: At goal  Due for repeat labs.  I reminded her today  CKD 3 a  baseline creatinine 1.0, at baseline  Cardiac testing  SPECT 9/2018:IMPRESSIONS: Abnormal study. There was a moderate amount of partially reversible defect of anterior wall suspicious for ischemia in the anterior region vs breast attenuation. Left ventricular systolic function was normal.   [facility] 2018  --  There was 1-vessel coronary artery disease ( 50  % LAD).  --  Left main: The vessel was normal sized. Angiography showed no evidence of disease.  --  LAD: The vessel was normal sized and moderately tortuous. Angiography showed minor luminal irregularities.  --  Mid LAD: The vessel was normal sized and moderately tortuous. Angiography showed a single discrete lesion.  --  Circumflex: The vessel was normal sized. Angiography showed minor luminal irregularities.  --  Mid circumflex: The vessel was small sized.  --  Distal circumflex: The vessel was small sized.  --  1st obtuse marginal: The vessel was normal sized and mildly tortuous. Angiography showed minor luminal irregularities.  --  2nd obtuse marginal: The vessel was small sized.  --  Ramus intermedius: The vessel was small to medium sized. Angiography showed minor luminal irregularities.  --  RCA: The vessel was normal sized and mildly tortuous. Angiography showed minor luminal irregularities.  Intervention results: PCI 50% stenosis mid LAD. Appearance unimproved with 50 % residual stenosis.  TTE 9/2018.:  EF 65%.  No RWMA.                            HPI  Sarah Bautista is a 84 y.o.year old female with CAD status post PCI of the LAD 2018, HLD, CKD 3A.  After PCI she had residual 50% nonobstructive LAD for which medical management was recommended .LV function has been preserved.  She follows in the office with Dr. Zhang.  She was last seen  1/10/2024.      1/10/2024 OV Dr. Zhang  Per his note    Interval History: Followup CAD.     She has no chest pain. She has some dyspnea with moderate exertion that is minor. No palpitations.       Coronary Artery Disease: Hx of nonobstructive CAD of LAD. No prior hx of PCI. Continue with aspirin and atorvastatin. LDL is well controlled.      Hypertension: She has been on metoprolol. BP is high today. She will monitor her         ADM 3/29 - 3/31/2025  CC: Change in mental status  CTH: Age-indeterminate possible lacunar infarct; no acute findings  Treated for  dehydration  Was positive for COVID-was treated conservatively, did not require remdesivir or steroids  Treated with short course of antibiotics for a possible UTI  TSH 12--> levothyroxine increased  No cardiac issues    4/23/2025  Routine cardiology follow-up.  She is  acc by her son who adds to history  +SOB   153/81  Current meds metoprolol 12.5 mg every 12, Lipitor 20.  Inconsistently taking aspirin  Exam unremarkable other than some memory loss  Will add amlodipine 2.5 mg daily, add back aspirin 81 daily  Encouraged her to get a fasting lipid profile               Review of Systems   Constitutional: Negative for chills.   Cardiovascular:  Positive for dyspnea on exertion. Negative for chest pain, claudication, cyanosis, irregular heartbeat, leg swelling, near-syncope, orthopnea, palpitations, paroxysmal nocturnal dyspnea and syncope.   Respiratory:  Negative for cough and shortness of breath.    Gastrointestinal:  Negative for heartburn and nausea.   Neurological:  Negative for dizziness, focal weakness, headaches, light-headedness and weakness.   All other systems reviewed and are negative.            Assessment  Diagnoses and all orders for this visit:    Coronary artery disease involving native coronary artery of native heart without angina pectoris    Stage 3a chronic kidney disease (HCC)    Mixed hyperlipidemia  -     Lipid panel; Future  -     Lipid panel    Primary hypertension  -     amLODIPine (NORVASC) 2.5 mg tablet; Take 1 tablet (2.5 mg total) by mouth daily    Dyspnea on effort  -     aspirin 81 mg EC tablet; Take 1 tablet (81 mg total) by mouth daily        Past Medical History:   Diagnosis Date   • Anxiety    • Back pain    • Disease of thyroid gland    • Diverticulitis    • Diverticulosis    • Fibroid    • GERD (gastroesophageal reflux disease)    • Heart murmur    • History of mammogram 11/01/2017   • Hypertension    • Hypothyroidism    • Osteoporosis    • Shortness of breath        Past  Surgical History:   Procedure Laterality Date   • APPENDECTOMY     • COLONOSCOPY  2013   • EYE SURGERY Right 07/05/2017    catarect surgery    • EYE SURGERY Left 05/24/2017    catarect surgery    • HEMORRHOID SURGERY     • MAMMO (HISTORICAL)  11/01/2017   • THYROID SURGERY     • TOTAL ABDOMINAL HYSTERECTOMY     • TUBAL LIGATION             Allergies  No Known Allergies      Medications    Current Outpatient Medications:   •  ALPRAZolam (XANAX) 0.5 mg tablet, Take 1 tablet (0.5 mg total) by mouth 2 (two) times a day as needed for anxiety, Disp: 180 tablet, Rfl: 5  •  amLODIPine (NORVASC) 2.5 mg tablet, Take 1 tablet (2.5 mg total) by mouth daily, Disp: 90 tablet, Rfl: 3  •  aspirin 81 mg EC tablet, Take 1 tablet (81 mg total) by mouth daily, Disp: 90 tablet, Rfl: 3  •  atorvastatin (LIPITOR) 20 mg tablet, Take 1 tablet (20 mg total) by mouth daily, Disp: 90 tablet, Rfl: 3  •  Calcium Carbonate-Vit D-Min (CALTRATE 600+D PLUS MINERALS) 600-800 MG-UNIT TABS, Take by mouth daily  , Disp: , Rfl:   •  levothyroxine 100 mcg tablet, Take 1 tablet (100 mcg total) by mouth daily in the early morning, Disp: 30 tablet, Rfl: 0  •  MAGNESIUM PO, Take by mouth daily  , Disp: , Rfl:   •  metoprolol tartrate (LOPRESSOR) 25 mg tablet, Take 0.5 tablets (12.5 mg total) by mouth every 12 (twelve) hours, Disp: 90 tablet, Rfl: 1  •  Multiple Vitamins-Minerals (PreserVision AREDS 2) CAPS, Take by mouth, Disp: , Rfl:   •  acetaminophen (TYLENOL) 500 mg tablet, Take 1 tablet (500 mg total) by mouth every 6 (six) hours as needed for mild pain, headaches or fever (Patient not taking: Reported on 4/23/2025), Disp: , Rfl:       Social History     Socioeconomic History   • Marital status: /Civil Union     Spouse name: Not on file   • Number of children: Not on file   • Years of education: 12   • Highest education level: Not on file   Occupational History   • Not on file   Tobacco Use   • Smoking status: Former     Types: Cigarettes      Passive exposure: Past   • Smokeless tobacco: Never   • Tobacco comments:     as per Elida   Vaping Use   • Vaping status: Never Used   Substance and Sexual Activity   • Alcohol use: Not Currently   • Drug use: No   • Sexual activity: Not Currently     Birth control/protection: Surgical     Comment: FAZAL    Other Topics Concern   • Not on file   Social History Narrative    · Most recent tobacco use screenin2020     · Do you currently or have you served in the [place]:   No      · Were you activated, into active duty, as a member of the [employer] or as a Reservist:   No      · Exercise level:   Occasional      · Diet:   Regular      · General stress level:   Medium      · Caffeine intake:   Moderate      · Advance directive:   Yes      · Has the Patient had a mammogram to screen for breast cancer within 24 months:   Yes      · Please enter the date of the Patient's previous mammogram.:   2017      · Date of last Colonoscopy:   2013     - As per Elida     ·      Social Drivers of Health     Financial Resource Strain: Low Risk  (2023)    Overall Financial Resource Strain (CARDIA)    • Difficulty of Paying Living Expenses: Not very hard   Food Insecurity: No Food Insecurity (3/30/2025)    Nursing - Inadequate Food Risk Classification    • Worried About Running Out of Food in the Last Year: Not on file    • Ran Out of Food in the Last Year: Not on file    • Ran Out of Food in the Last Year: Never true   Transportation Needs: No Transportation Needs (3/30/2025)    Nursing - Transportation Risk Classification    • Lack of Transportation: Not on file    • Lack of Transportation: No   Physical Activity: Not on file   Stress: Not on file   Social Connections: Unknown (2024)    Received from [employer]    Social Connections    • How often do you feel lonely or isolated from those around you? (Adult - for ages 18 years and over): Not on file   Intimate Partner Violence: Unknown  (3/30/2025)    Nursing IPS    • Feels Physically and Emotionally Safe: Not on file    • Physically Hurt by Someone: Not on file    • Humiliated or Emotionally Abused by Someone: Not on file    • Physically Hurt by Someone: No    • Hurt or Threatened by Someone: No   Housing Stability: Unknown (3/30/2025)    Nursing: Inadequate Housing Risk Classification    • Has Housing: Not on file    • Worried About Losing Housing: Not on file    • Unable to Get Utilities: Not on file    • Unable to Pay for Housing in the Last Year: No    • Has Housin       Family History   Problem Relation Age of Onset   • Hypertension Father    • Asthma Son    • Arrhythmia Son        Physical Exam  Vitals and nursing note reviewed.   Constitutional:       General: She is not in acute distress.     Appearance: She is not diaphoretic.   HENT:      Head: Normocephalic and atraumatic.   Eyes:      Conjunctiva/sclera: Conjunctivae normal.   Cardiovascular:      Rate and Rhythm: Normal rate and regular rhythm.      Pulses: Intact distal pulses.      Heart sounds: Normal heart sounds.   Pulmonary:      Effort: Pulmonary effort is normal.      Breath sounds: Normal breath sounds.   Abdominal:      General: Bowel sounds are normal.      Palpations: Abdomen is soft.   Musculoskeletal:         General: Normal range of motion.      Cervical back: Normal range of motion and neck supple.   Skin:     General: Skin is warm and dry.   Neurological:      Mental Status: She is alert and oriented to person, place, and time.         Vitals: Blood pressure 153/81, pulse 71, weight 63 kg (139 lb), SpO2 98%, not currently breastfeeding.   Wt Readings from Last 3 Encounters:   25 63 kg (139 lb)   25 63 kg (139 lb)   25 64.8 kg (142 lb 13.7 oz)           Labs & Results:  Lab Results   Component Value Date    WBC 7.60 2025    HGB 12.4 2025    HCT 38.1 2025    MCV 96 2025     2025     No results found for:  "\"BNP\"  No components found for: \"CHEM\"  No results found for: \"CKTOTAL\", \"TROPONINI\", \"TROPONINT\", \"CKMBINDEX\"  Results for orders placed during the hospital encounter of 18    Echo complete with contrast if indicated    [facility]  [address]  [place] [address]  [phone]    Transthoracic Echocardiogram  2D, M-mode, and Color Doppler    Study date:  06-Sep-2018    Patient: DYLAN ALVA  MR number: [id number]  Account number: [id number]  : 1940  Age: 77 years  Gender: Female  Status: Outpatient  Location: [facility]  Height: 59 in  Weight: 139 lb  BP: 128/ 52 mmHg    Indications: Dyspnea.    Diagnoses: R06.09 - Other forms of dyspnea    Sonographer:  Betancourt RCS  Interpreting Physician:  Sagar Kaufman DO  Primary Physician:  Brett Conde MD  Referring Physician:  Savannah Tovar MD  Group:  [facility]    IMPRESSIONS:  Normal left ventricular size and function.  Normal right ventricular size and function.  No significant valvular abnormality.  No etiology for dyspnea identified.  No change compared to TTE on 3/1/2016.    SUMMARY    LEFT VENTRICLE:  Systolic function was normal by visual assessment. Ejection fraction was estimated to be 65 %.  There were no regional wall motion abnormalities.    COMPARISONS:  Compared to prior TTE of 3/1/2016, no significant change was found.    PROCEDURE: The study was performed in the [facility]. This was a routine study. The transthoracic approach was used. The study included complete 2D imaging, M-mode, and color Doppler. The heart rate was 69 bpm, at the  start of the study. Images were obtained from the parasternal, apical, subcostal, and suprasternal notch acoustic windows. Intravenous contrast ( 1.2mL Definity in NSS) was administered to opacify the left ventricle. Echocardiographic  views were limited due to poor acoustic window " availability, decreased penetration, and lung interference. This was a technically difficult study.    LEFT VENTRICLE: Size was normal. Systolic function was normal by visual assessment. Ejection fraction was estimated to be 65 %. There were no regional wall motion abnormalities. DOPPLER: Left ventricular diastolic function parameters were  normal for the patient's age.    RIGHT VENTRICLE: The size was normal. Systolic function was normal.    LEFT ATRIUM: Size was normal.    RIGHT ATRIUM: Size was normal.    MITRAL VALVE: Valve structure was normal. There was normal leaflet separation. DOPPLER: The transmitral velocity was within the normal range. There was no evidence for stenosis. There was no regurgitation.    AORTIC VALVE: The valve was trileaflet. Leaflets exhibited normal thickness and normal cuspal separation. DOPPLER: Transaortic velocity was within the normal range. There was no evidence for stenosis. There was no regurgitation.    TRICUSPID VALVE: The valve structure was normal. There was normal leaflet separation. DOPPLER: The transtricuspid velocity was within the normal range. There was no evidence for stenosis. There was no regurgitation.    PULMONIC VALVE: Leaflets exhibited normal thickness, no calcification, and normal cuspal separation. DOPPLER: The transpulmonic velocity was within the normal range. There was no regurgitation.    PERICARDIUM: There was no pericardial effusion. The pericardium was normal in appearance.    AORTA: The root exhibited normal size.    SYSTEMIC VEINS: IVC: The inferior vena cava was normal in size. Respirophasic changes were normal.    SYSTEM MEASUREMENT TABLES    Apical four chamber  TAPSE: 17 mm    Unspecified Scan Mode  Aortic Root Diameter; End Systole;: 29.1 mm  Aortic valve Area; Continuity Equation by Velocity Time Integral; Systole;: 3.84 cm2  Cardiovascular Orifice Diameter; End Systole;: 21.9 mm  Gradient Pressure, Peak; Simplified Bernoulli; Antegrade Flow;  Systole;: 7.5 mm[Hg]  Gradient pressure, average; Simplified Bernoulli; Antegrade Flow; Systole;: 4.3 mm[Hg]  Left Atrium to Aortic Root Ratio;: 1.06  Left atrial diameter; End Diastole;: 30.8 mm  Cardiac Output; Teichholz; Systole;: 2.18 L/min  Heart rate; Teichholz;: 74 /min  Interventricular Septum Diastolic Thickness; Teichholz; End Diastole;: 11.8 mm  Left Ventricle Internal End Diastolic Dimension; Teichholz;: 33.8 mm  Left Ventricle Internal Systolic Dimension; Teichholz; End Systole;: 22.6 mm  Left Ventricle Mass; Mass AVCube with Teichholz; End Diastole;: 94 g  Left Ventricle Posterior Wall Diastolic Thickness; Teichholz; End Diastole;: 7.7 mm  Left Ventricular Ejection Fraction; Teichholz;: 63 %  Left Ventricular End Diastolic Volume; Teichholz;: 46.8 ml  Left Ventricular End Systolic Volume; Teichholz;: 17.3 ml  Left Ventricular Fractional Shortening;: 33.1 %  Stroke volume; Teichholz; Systole;: 29.5 ml  Mitral Valve Area; Area by Pressure Half-Time; Systole;: 3.49 cm2  Mitral Valve E to A Ratio; Systole;: 0.82  Pressure half time; Diastole;: 0.06 s    [employer] Commission Accredited Echocardiography Laboratory    Prepared and electronically signed by    Sagar Kaufman DO  Signed 06-Sep-2018 19:31:06    No results found for this or any previous visit.    No valid procedures specified.  No results found for this or any previous visit.                This note was completed in part utilizing [employer] direct voice recognition software.   Grammatical errors, random word insertion, spelling mistakes, and incomplete sentences may be an occasional consequence of the system secondary to software limitations, ambient noise and hardware issues. At the time of dictation, efforts were made to edit, clarify and /or correct errors.  Please read the chart carefully and recognize, using context, where substitutions have occurred.  If you have any questions or concerns about the context, text or information  contained within the body of this dictation, please contact myself, the provider, for further clarification

## 2025-04-29 PROBLEM — N39.0 UTI (URINARY TRACT INFECTION): Status: RESOLVED | Noted: 2025-03-29 | Resolved: 2025-04-29

## 2025-05-27 DIAGNOSIS — E03.9 HYPOTHYROIDISM, UNSPECIFIED TYPE: ICD-10-CM

## 2025-05-27 RX ORDER — LEVOTHYROXINE SODIUM 100 UG/1
100 TABLET ORAL
Qty: 30 TABLET | Refills: 0 | Status: SHIPPED | OUTPATIENT
Start: 2025-05-27 | End: 2025-06-04

## 2025-06-03 DIAGNOSIS — E03.9 HYPOTHYROIDISM, UNSPECIFIED TYPE: ICD-10-CM

## 2025-06-03 DIAGNOSIS — E03.9 HYPOTHYROIDISM, UNSPECIFIED TYPE: Primary | ICD-10-CM

## 2025-06-04 RX ORDER — LEVOTHYROXINE SODIUM 88 UG/1
TABLET ORAL
Qty: 48 TABLET | Refills: 3 | Status: SHIPPED | OUTPATIENT
Start: 2025-06-04

## 2025-06-04 RX ORDER — LEVOTHYROXINE SODIUM 100 UG/1
100 TABLET ORAL 3 TIMES WEEKLY
Qty: 36 TABLET | Refills: 1 | Status: SHIPPED | OUTPATIENT
Start: 2025-06-04

## 2025-06-17 NOTE — PROGRESS NOTES
Name: Sarah Bautista      : 1940      MRN: [id number]  Encounter Provider: Brett Conde MD  Encounter Date: 2025   Encounter department: [facility]  :  Assessment & Plan  Anxiety  Patient to continue utilizing medical therapy as well as counseling sources as applicable to condition. If suicidal thought or fear of suicide attempt, to call 911 and seek help immediately. Medications and therapy reviewed today and all patient  questions answered today.        Coronary artery disease involving native coronary artery of native heart without angina pectoris  Patient to continue  with current cardiac meds to decrease risk of re stenosis. Patient to follow up with cardiology for scheduled appointments to decrease risk for further cardiac problems with appropriate diagnostic testing to reassess cardiac status. Patient had alll questions about this problem answered today.        Mixed hyperlipidemia  Patient  is stable with current medication and we discussed a low fat low cholesterol diet. Weight loss also discussed for this will help lower cholesterol also. Recheck lipids in 6 months.        Primary hypertension  Patient is stable with current anti-hypertensive medicine and continue to follow a low sodium diet and take current medication. All questions about this condition were answered today.        Other specified hypothyroidism  Patient to continue current dose of thyroid medicine and recheck TSH in 6 months   Orders:  •  TSH, 3rd generation with Free T4 reflex; Future    Stage 3a chronic kidney disease (HCC)  Lab Results   Component Value Date    EGFR 58 2025    EGFR 52 2025    EGFR 43 2025    CREATININE 0.90 2025    CREATININE 0.98 2025    CREATININE 1.16 2025   Pt to avoid NSAIDs and any IV dyes. Patient to follow up eoither with nephrology or  with us for  further  monitoring of  renal function.        MCI (mild cognitive impairment)  "with memory loss    Orders:  •  donepezil (ARICEPT) 5 mg tablet; Take 1 tablet (5 mg total) by mouth daily at bedtime          Depression Screening and Follow-up Plan:   Clincally patient does not have depression. No treatment is required.     Falls Plan of Care: balance, strength, and gait training instructions were provided. Home safety education provided.     History of Present Illness   84-year-old female today for checkup for multimedical problems patient with history of hypertension hyperlipidemia hypothyroidism and chronic kidney disease stage IIIa and she is stable with those problems but she is here today for further evaluation of cognitive decline.  Patient had a Folstein exam done today which showed 26 out of 30 with her biggest problems is on spatial ability as well as on short-term memory loss.  This was kind of suspected with the symptoms she has been having recently and she appears to have MCI.  Will see about starting her on some Aricept 5 mg and bring her back in about 2 months to reevaluate her for increasing the medication for the future and to see how she is doing with it.      Review of Systems    Objective   /64 (BP Location: Left arm, Patient Position: Sitting, Cuff Size: Standard)   Pulse 71   Temp 97.6 °F (36.4 °C) (Temporal)   Resp 16   Ht 4' 10\" (1.473 m)   Wt 64 kg (141 lb)   LMP  (LMP Unknown)   SpO2 98%   BMI 29.47 kg/m²      Physical Exam    "

## 2025-06-18 ENCOUNTER — OFFICE VISIT (OUTPATIENT)
Dept: FAMILY MEDICINE CLINIC | Facility: CLINIC | Age: 85
End: 2025-06-18
Payer: MEDICARE

## 2025-06-18 VITALS
WEIGHT: 141 LBS | SYSTOLIC BLOOD PRESSURE: 152 MMHG | DIASTOLIC BLOOD PRESSURE: 64 MMHG | RESPIRATION RATE: 16 BRPM | OXYGEN SATURATION: 98 % | TEMPERATURE: 97.6 F | HEART RATE: 71 BPM | HEIGHT: 58 IN | BODY MASS INDEX: 29.6 KG/M2

## 2025-06-18 DIAGNOSIS — E03.8 OTHER SPECIFIED HYPOTHYROIDISM: ICD-10-CM

## 2025-06-18 DIAGNOSIS — I10 PRIMARY HYPERTENSION: ICD-10-CM

## 2025-06-18 DIAGNOSIS — E78.2 MIXED HYPERLIPIDEMIA: ICD-10-CM

## 2025-06-18 DIAGNOSIS — F41.9 ANXIETY: Primary | ICD-10-CM

## 2025-06-18 DIAGNOSIS — N18.31 STAGE 3A CHRONIC KIDNEY DISEASE (HCC): ICD-10-CM

## 2025-06-18 DIAGNOSIS — G31.84 MCI (MILD COGNITIVE IMPAIRMENT) WITH MEMORY LOSS: ICD-10-CM

## 2025-06-18 DIAGNOSIS — I25.10 CORONARY ARTERY DISEASE INVOLVING NATIVE CORONARY ARTERY OF NATIVE HEART WITHOUT ANGINA PECTORIS: ICD-10-CM

## 2025-06-18 PROCEDURE — 99214 OFFICE O/P EST MOD 30 MIN: CPT | Performed by: FAMILY MEDICINE

## 2025-06-18 PROCEDURE — G2211 COMPLEX E/M VISIT ADD ON: HCPCS | Performed by: FAMILY MEDICINE

## 2025-06-18 RX ORDER — DONEPEZIL HYDROCHLORIDE 5 MG/1
5 TABLET, FILM COATED ORAL
Qty: 30 TABLET | Refills: 1 | Status: SHIPPED | OUTPATIENT
Start: 2025-06-18

## 2025-07-11 DIAGNOSIS — G31.84 MCI (MILD COGNITIVE IMPAIRMENT) WITH MEMORY LOSS: ICD-10-CM

## 2025-07-11 RX ORDER — DONEPEZIL HYDROCHLORIDE 5 MG/1
5 TABLET, FILM COATED ORAL
Qty: 90 TABLET | Refills: 1 | Status: SHIPPED | OUTPATIENT
Start: 2025-07-11

## 2025-07-23 ENCOUNTER — RA CDI HCC (OUTPATIENT)
Dept: OTHER | Facility: HOSPITAL | Age: 85
End: 2025-07-23

## 2025-07-23 PROBLEM — U07.1 COVID-19: Status: RESOLVED | Noted: 2025-03-29 | Resolved: 2025-07-23

## 2025-08-05 ENCOUNTER — TELEPHONE (OUTPATIENT)
Age: 85
End: 2025-08-05

## 2025-08-18 ENCOUNTER — OFFICE VISIT (OUTPATIENT)
Dept: FAMILY MEDICINE CLINIC | Facility: CLINIC | Age: 85
End: 2025-08-18
Payer: MEDICARE

## 2025-08-18 VITALS
HEIGHT: 58 IN | BODY MASS INDEX: 29.39 KG/M2 | DIASTOLIC BLOOD PRESSURE: 80 MMHG | HEART RATE: 61 BPM | SYSTOLIC BLOOD PRESSURE: 116 MMHG | TEMPERATURE: 97.1 F | WEIGHT: 140 LBS

## 2025-08-18 DIAGNOSIS — E03.8 OTHER SPECIFIED HYPOTHYROIDISM: ICD-10-CM

## 2025-08-18 DIAGNOSIS — Z00.00 MEDICARE ANNUAL WELLNESS VISIT, SUBSEQUENT: Primary | ICD-10-CM

## 2025-08-18 DIAGNOSIS — N18.31 STAGE 3A CHRONIC KIDNEY DISEASE (HCC): ICD-10-CM

## 2025-08-18 DIAGNOSIS — E78.5 HYPERLIPIDEMIA, UNSPECIFIED HYPERLIPIDEMIA TYPE: ICD-10-CM

## 2025-08-18 DIAGNOSIS — G31.84 MCI (MILD COGNITIVE IMPAIRMENT) WITH MEMORY LOSS: ICD-10-CM

## 2025-08-18 DIAGNOSIS — E78.2 MIXED HYPERLIPIDEMIA: ICD-10-CM

## 2025-08-18 DIAGNOSIS — F41.9 ANXIETY: ICD-10-CM

## 2025-08-18 DIAGNOSIS — E03.9 HYPOTHYROIDISM, UNSPECIFIED TYPE: ICD-10-CM

## 2025-08-18 DIAGNOSIS — I10 PRIMARY HYPERTENSION: ICD-10-CM

## 2025-08-18 DIAGNOSIS — I25.10 CORONARY ARTERY DISEASE INVOLVING NATIVE CORONARY ARTERY OF NATIVE HEART WITHOUT ANGINA PECTORIS: ICD-10-CM

## 2025-08-18 DIAGNOSIS — R06.09 DYSPNEA ON EFFORT: ICD-10-CM

## 2025-08-18 DIAGNOSIS — R94.39 ABNORMAL STRESS TEST: ICD-10-CM

## 2025-08-18 PROCEDURE — 99214 OFFICE O/P EST MOD 30 MIN: CPT | Performed by: FAMILY MEDICINE

## 2025-08-18 PROCEDURE — G0439 PPPS, SUBSEQ VISIT: HCPCS | Performed by: FAMILY MEDICINE

## 2025-08-18 PROCEDURE — G2211 COMPLEX E/M VISIT ADD ON: HCPCS | Performed by: FAMILY MEDICINE

## 2025-08-18 RX ORDER — LEVOTHYROXINE SODIUM 88 UG/1
TABLET ORAL
Qty: 48 TABLET | Refills: 3 | Status: SHIPPED | OUTPATIENT
Start: 2025-08-18

## 2025-08-18 RX ORDER — AMLODIPINE BESYLATE 2.5 MG/1
2.5 TABLET ORAL DAILY
Qty: 90 TABLET | Refills: 3 | Status: SHIPPED | OUTPATIENT
Start: 2025-08-18

## 2025-08-18 RX ORDER — LEVOTHYROXINE SODIUM 100 UG/1
100 TABLET ORAL 3 TIMES WEEKLY
Qty: 36 TABLET | Refills: 1 | Status: SHIPPED | OUTPATIENT
Start: 2025-08-18

## 2025-08-18 RX ORDER — ATORVASTATIN CALCIUM 20 MG/1
20 TABLET, FILM COATED ORAL DAILY
Qty: 90 TABLET | Refills: 3 | Status: SHIPPED | OUTPATIENT
Start: 2025-08-18

## 2025-08-18 RX ORDER — METOPROLOL TARTRATE 25 MG/1
12.5 TABLET, FILM COATED ORAL EVERY 12 HOURS SCHEDULED
Qty: 90 TABLET | Refills: 1 | Status: SHIPPED | OUTPATIENT
Start: 2025-08-18

## 2025-08-18 RX ORDER — DONEPEZIL HYDROCHLORIDE 10 MG/1
10 TABLET, FILM COATED ORAL
Qty: 90 TABLET | Refills: 1 | Status: SHIPPED | OUTPATIENT
Start: 2025-08-18

## 2025-08-18 RX ORDER — ALPRAZOLAM 0.5 MG
0.5 TABLET ORAL 2 TIMES DAILY PRN
Qty: 180 TABLET | Refills: 5 | Status: SHIPPED | OUTPATIENT
Start: 2025-08-18